# Patient Record
Sex: MALE | Race: WHITE | NOT HISPANIC OR LATINO
[De-identification: names, ages, dates, MRNs, and addresses within clinical notes are randomized per-mention and may not be internally consistent; named-entity substitution may affect disease eponyms.]

---

## 2017-07-03 PROBLEM — Z78.9 SOCIAL ALCOHOL USE: Status: ACTIVE | Noted: 2017-07-03

## 2017-07-03 PROBLEM — R94.31 ABNORMAL EKG: Status: ACTIVE | Noted: 2017-07-03

## 2017-07-03 PROBLEM — I50.9 CHF (CONGESTIVE HEART FAILURE): Status: ACTIVE | Noted: 2017-07-03

## 2017-07-03 PROBLEM — R00.2 PALPITATIONS: Status: ACTIVE | Noted: 2017-07-03

## 2017-07-05 ENCOUNTER — APPOINTMENT (OUTPATIENT)
Dept: CARDIOTHORACIC SURGERY | Facility: CLINIC | Age: 62
End: 2017-07-05

## 2017-07-05 VITALS
RESPIRATION RATE: 15 BRPM | OXYGEN SATURATION: 97 % | TEMPERATURE: 98.4 F | HEART RATE: 87 BPM | SYSTOLIC BLOOD PRESSURE: 152 MMHG | BODY MASS INDEX: 23.1 KG/M2 | WEIGHT: 165 LBS | DIASTOLIC BLOOD PRESSURE: 91 MMHG | HEIGHT: 71 IN

## 2017-07-05 DIAGNOSIS — R00.2 PALPITATIONS: ICD-10-CM

## 2017-07-05 DIAGNOSIS — I50.9 HEART FAILURE, UNSPECIFIED: ICD-10-CM

## 2017-07-05 DIAGNOSIS — Z78.9 OTHER SPECIFIED HEALTH STATUS: ICD-10-CM

## 2017-07-05 DIAGNOSIS — R94.31 ABNORMAL ELECTROCARDIOGRAM [ECG] [EKG]: ICD-10-CM

## 2017-07-05 DIAGNOSIS — I25.10 ATHEROSCLEROTIC HEART DISEASE OF NATIVE CORONARY ARTERY W/OUT ANGINA PECTORIS: ICD-10-CM

## 2017-07-13 ENCOUNTER — APPOINTMENT (OUTPATIENT)
Dept: CV DIAGNOSITCS | Facility: HOSPITAL | Age: 62
End: 2017-07-13

## 2017-07-13 ENCOUNTER — OUTPATIENT (OUTPATIENT)
Dept: OUTPATIENT SERVICES | Facility: HOSPITAL | Age: 62
LOS: 1 days | End: 2017-07-13
Payer: COMMERCIAL

## 2017-07-13 DIAGNOSIS — I71.9 AORTIC ANEURYSM OF UNSPECIFIED SITE, WITHOUT RUPTURE: ICD-10-CM

## 2017-07-13 PROCEDURE — 93306 TTE W/DOPPLER COMPLETE: CPT

## 2017-07-13 PROCEDURE — 93306 TTE W/DOPPLER COMPLETE: CPT | Mod: 26

## 2017-08-17 ENCOUNTER — APPOINTMENT (OUTPATIENT)
Dept: CV DIAGNOSTICS | Facility: HOSPITAL | Age: 62
End: 2017-08-17

## 2017-08-17 ENCOUNTER — OUTPATIENT (OUTPATIENT)
Dept: OUTPATIENT SERVICES | Facility: HOSPITAL | Age: 62
LOS: 1 days | End: 2017-08-17
Payer: COMMERCIAL

## 2017-08-17 DIAGNOSIS — I71.9 AORTIC ANEURYSM OF UNSPECIFIED SITE, WITHOUT RUPTURE: ICD-10-CM

## 2017-08-22 ENCOUNTER — APPOINTMENT (OUTPATIENT)
Dept: CV DIAGNOSTICS | Facility: HOSPITAL | Age: 62
End: 2017-08-22

## 2017-08-22 PROCEDURE — 93018 CV STRESS TEST I&R ONLY: CPT

## 2017-08-22 PROCEDURE — 78452 HT MUSCLE IMAGE SPECT MULT: CPT | Mod: 26

## 2017-08-22 PROCEDURE — 78452 HT MUSCLE IMAGE SPECT MULT: CPT

## 2017-08-22 PROCEDURE — 93016 CV STRESS TEST SUPVJ ONLY: CPT

## 2017-08-22 PROCEDURE — A9500: CPT

## 2017-08-22 PROCEDURE — 93017 CV STRESS TEST TRACING ONLY: CPT

## 2017-09-21 ENCOUNTER — APPOINTMENT (OUTPATIENT)
Dept: CARDIOTHORACIC SURGERY | Facility: CLINIC | Age: 62
End: 2017-09-21
Payer: COMMERCIAL

## 2017-09-21 VITALS
RESPIRATION RATE: 14 BRPM | HEART RATE: 69 BPM | DIASTOLIC BLOOD PRESSURE: 69 MMHG | HEIGHT: 71 IN | SYSTOLIC BLOOD PRESSURE: 115 MMHG | OXYGEN SATURATION: 97 % | WEIGHT: 165 LBS | TEMPERATURE: 98.2 F | BODY MASS INDEX: 23.1 KG/M2

## 2017-09-21 DIAGNOSIS — R06.09 OTHER FORMS OF DYSPNEA: ICD-10-CM

## 2017-09-21 PROCEDURE — 99214 OFFICE O/P EST MOD 30 MIN: CPT

## 2017-09-21 RX ORDER — ALBUTEROL 90 MCG
90 AEROSOL (GRAM) INHALATION
Refills: 0 | Status: COMPLETED | COMMUNITY
End: 2017-09-21

## 2017-09-21 RX ORDER — BISOPROLOL FUMARATE 5 MG/1
5 TABLET, FILM COATED ORAL DAILY
Refills: 0 | Status: COMPLETED | COMMUNITY
End: 2017-09-21

## 2017-09-21 RX ORDER — ENALAPRIL MALEATE 20 MG/1
20 TABLET ORAL DAILY
Refills: 0 | Status: COMPLETED | COMMUNITY
End: 2017-09-21

## 2017-09-25 ENCOUNTER — APPOINTMENT (OUTPATIENT)
Dept: GASTROENTEROLOGY | Facility: CLINIC | Age: 62
End: 2017-09-25

## 2017-09-29 ENCOUNTER — APPOINTMENT (OUTPATIENT)
Dept: GASTROENTEROLOGY | Facility: CLINIC | Age: 62
End: 2017-09-29
Payer: COMMERCIAL

## 2017-09-29 VITALS
DIASTOLIC BLOOD PRESSURE: 85 MMHG | RESPIRATION RATE: 16 BRPM | HEART RATE: 74 BPM | WEIGHT: 165 LBS | BODY MASS INDEX: 23.1 KG/M2 | SYSTOLIC BLOOD PRESSURE: 135 MMHG | OXYGEN SATURATION: 98 % | HEIGHT: 71 IN

## 2017-09-29 DIAGNOSIS — Z85.51 PERSONAL HISTORY OF MALIGNANT NEOPLASM OF BLADDER: ICD-10-CM

## 2017-09-29 PROCEDURE — 99204 OFFICE O/P NEW MOD 45 MIN: CPT

## 2017-10-06 ENCOUNTER — APPOINTMENT (OUTPATIENT)
Dept: GASTROENTEROLOGY | Facility: CLINIC | Age: 62
End: 2017-10-06
Payer: COMMERCIAL

## 2017-10-06 VITALS
OXYGEN SATURATION: 98 % | BODY MASS INDEX: 23.1 KG/M2 | HEIGHT: 71 IN | HEART RATE: 66 BPM | DIASTOLIC BLOOD PRESSURE: 96 MMHG | SYSTOLIC BLOOD PRESSURE: 142 MMHG | WEIGHT: 165 LBS | RESPIRATION RATE: 16 BRPM | TEMPERATURE: 98.6 F

## 2017-10-06 DIAGNOSIS — R07.89 OTHER CHEST PAIN: ICD-10-CM

## 2017-10-06 PROCEDURE — 99214 OFFICE O/P EST MOD 30 MIN: CPT

## 2017-10-06 PROCEDURE — 99204 OFFICE O/P NEW MOD 45 MIN: CPT

## 2017-10-06 RX ORDER — AZILSARTAN KAMEDOXOMIL AND CHLORTHALIDONE 40; 12.5 MG/1; MG/1
40-12.5 TABLET ORAL DAILY
Refills: 0 | Status: DISCONTINUED | COMMUNITY
Start: 2017-09-21 | End: 2017-10-06

## 2017-10-06 RX ORDER — POLYETHYLENE GLYCOL 3350, SODIUM SULFATE, SODIUM CHLORIDE, POTASSIUM CHLORIDE, ASCORBIC ACID, SODIUM ASCORBATE 7.5-2.691G
100 KIT ORAL
Qty: 1 | Refills: 0 | Status: DISCONTINUED | COMMUNITY
Start: 2017-09-29 | End: 2017-10-06

## 2017-12-01 ENCOUNTER — CLINICAL ADVICE (OUTPATIENT)
Age: 62
End: 2017-12-01

## 2017-12-22 ENCOUNTER — APPOINTMENT (OUTPATIENT)
Dept: CV DIAGNOSITCS | Facility: HOSPITAL | Age: 62
End: 2017-12-22

## 2017-12-22 ENCOUNTER — OUTPATIENT (OUTPATIENT)
Dept: OUTPATIENT SERVICES | Facility: HOSPITAL | Age: 62
LOS: 1 days | End: 2017-12-22
Payer: COMMERCIAL

## 2017-12-22 DIAGNOSIS — I50.9 HEART FAILURE, UNSPECIFIED: ICD-10-CM

## 2017-12-22 PROCEDURE — 93306 TTE W/DOPPLER COMPLETE: CPT

## 2017-12-22 PROCEDURE — 93306 TTE W/DOPPLER COMPLETE: CPT | Mod: 26

## 2018-02-23 ENCOUNTER — CLINICAL ADVICE (OUTPATIENT)
Age: 63
End: 2018-02-23

## 2018-03-01 LAB
HDLC SERPL-MCNC: 47 MG/DL
LDLC SERPL DIRECT ASSAY-MCNC: 132 MG/DL
TRIGL SERPL-MCNC: 220 MG/DL

## 2019-12-27 ENCOUNTER — APPOINTMENT (OUTPATIENT)
Dept: GASTROENTEROLOGY | Facility: CLINIC | Age: 64
End: 2019-12-27
Payer: COMMERCIAL

## 2019-12-27 VITALS
DIASTOLIC BLOOD PRESSURE: 77 MMHG | OXYGEN SATURATION: 94 % | SYSTOLIC BLOOD PRESSURE: 145 MMHG | WEIGHT: 180 LBS | HEART RATE: 84 BPM | HEIGHT: 71 IN | BODY MASS INDEX: 25.2 KG/M2

## 2019-12-27 DIAGNOSIS — K44.9 DIAPHRAGMATIC HERNIA W/OUT OBSTRUCTION OR GANGRENE: ICD-10-CM

## 2019-12-27 PROCEDURE — 99214 OFFICE O/P EST MOD 30 MIN: CPT

## 2019-12-27 NOTE — HISTORY OF PRESENT ILLNESS
[de-identified] : 65 y/o man with Hx of HTN, hyperlipidemia, CAD, CHF, aortic aneurysm, COPD not on home O2, former smoker who presents for follow up. \par \par The patient had a CT scan of the abdomen and pelvis with IV contrast on October 21, 2019.  The pancreas appeared normal. He likely have a benign adenoma in the right adrenal gland measuring 2.1 cm.   A small hiatal hernia is present.\par \par Patient reports that he continues to take Dexilant 60 mg once a day since 2017. He also takes Tagamet along with Carafate. He says despite these medications, once a week, he has heartburn and will have reflux of "acid" in his mouth. He also reports having a "twisty" type of pain in his epigastric area. The twisty pain occurs at the time of heartburn.\par \par In 2017, he required stents in his heart and was on Plavix. He's currently only on aspirin 81 mg once a day.\par \par In 2018 he underwent upper endoscopy and colonoscopy. He had colon polyps removed. Told that he has a hiatal hernia.  Was told told to have a repeat colonoscopy in 2 years - but not sure.  I don't have records of his endoscopy and colonoscopy and he will get those records for our review.\par \par \par \par \par \par From prior notes: \par Pt reports having heartburn for years requiring Nexium. Lately he has been on Nexium twice a day with worsening heartburn. He says in the middle of the night he wakes up with chest pain that feels like a knife, radiating to back- lasting for a few min, or longer. When this happens he has to stretch his back, catch his breath, drink water. He says during the day he will also get similar chest pains - during the day he says he is constantly moving, walking because of his job. He says he gets SOB on exertion - SOB has is getting worse recently. Stress test on Aug 22, 2017 showed medium sized mild to moderate defect in the basal to mild inferoseptal and basal inferior wall that is mostly fixed suggestive of infarct with minimal héctor-infarct ischemia. He has an appointment with his cardiologist. \par \par In 2014 patient reports he had chest discomfort and presented to his local ER where imaging revealed aorta dilatation (ascending aorta measured up to 4.1 cm). Patient followed up with cardiologist with repeat imaging. Most recent CT scan measured aortic root at 5.1 cm. He is seen by cardiothoracic surgeon and was "recommend to continue to monitor and operate when necessary as surgery would be complex from a surgical standpoint. Continue to monitor on a regular basis and if there are signs to intervene we would then operate. Imaging in six months (November/December)."\par \par He has a Hx of colon polyps in the past - last colonoscopy was 6 or 7 years ago - he was told to have another colonoscopy in 2 years but did not follow through. \par \par He was recently seen by a gastroenterologist and scheduled for an upper endoscopy and colonoscopy but he has not had it yet because he needed clearance.\par \par He says two of his uncles on both side of the family have had pancreatic cancer (youngest age was 42). His paternal Aunt has had pancreatic cancer. His father has had colon polyps. \par  \par \par \par Discussion/Summary Oct 2017: \par Patient continues to have worsening heartburn - he says he has tried everything under the sun including Pepcid, Ranitidine, Nexium, omeprazole, PPI twice a day and nothing helps.  Dexilant samples that were given in office has helped and he would like to try this. \par \par  \par \par

## 2019-12-27 NOTE — PHYSICAL EXAM
[General Appearance - Alert] : alert [Sclera] : the sclera and conjunctiva were normal [Extraocular Movements] : extraocular movements were intact [General Appearance - In No Acute Distress] : in no acute distress [Outer Ear] : the ears and nose were normal in appearance [Hearing Threshold Finger Rub Not [place]] : hearing was normal [Neck Appearance] : the appearance of the neck was normal [Neck Cervical Mass (___cm)] : no neck mass was observed [Auscultation Breath Sounds / Voice Sounds] : lungs were clear to auscultation bilaterally [Heart Rate And Rhythm] : heart rate was normal and rhythm regular [Heart Sounds] : normal S1 and S2 [Heart Sounds Gallop] : no gallops [Murmurs] : no murmurs [Bowel Sounds] : normal bowel sounds [Heart Sounds Pericardial Friction Rub] : no pericardial rub [Abdomen Tenderness] : non-tender [Abdomen Soft] : soft [Supraclavicular Lymph Nodes Enlarged Bilaterally] : supraclavicular [Cervical Lymph Nodes Enlarged Anterior Bilaterally] : anterior cervical [Cervical Lymph Nodes Enlarged Posterior Bilaterally] : posterior cervical [Skin Color & Pigmentation] : normal skin color and pigmentation [Nail Clubbing] : no clubbing  or cyanosis of the fingernails [] : no rash [Oriented To Time, Place, And Person] : oriented to person, place, and time [Affect] : the affect was normal [Impaired Insight] : insight and judgment were intact [FreeTextEntry1] : +surgical scaring

## 2019-12-27 NOTE — ASSESSMENT
[FreeTextEntry1] : 63 y/o man with Hx of HTN, hyperlipidemia, CAD, CHF, aortic aneurysm, COPD not on home O2, former smoker who presents for follow up of GERD.  \par \par Discussed side effects of prolonged PPI therapy.  He would like to continue PPI therapy for along with Tagamet - he will focus on weight loss and see if he can gradually come off of PPI therapy.  I also had a discussion regarding surgical correction of hiatal hernia - he will think about and would like an initial consultation. \par \par He will obtain records of his recent EGD/colonoscopy - he will call me to review them with him in 2 weeks.  He maybe due for a colonoscopy soon, but he is not sure - he will get us records.  \par \par Follow up in 3 months.

## 2020-03-31 ENCOUNTER — APPOINTMENT (OUTPATIENT)
Dept: GASTROENTEROLOGY | Facility: HOSPITAL | Age: 65
End: 2020-03-31

## 2020-06-16 ENCOUNTER — APPOINTMENT (OUTPATIENT)
Dept: DISASTER EMERGENCY | Facility: CLINIC | Age: 65
End: 2020-06-16

## 2020-06-17 ENCOUNTER — TRANSCRIPTION ENCOUNTER (OUTPATIENT)
Age: 65
End: 2020-06-17

## 2020-06-17 LAB — SARS-COV-2 N GENE NPH QL NAA+PROBE: NOT DETECTED

## 2020-06-18 ENCOUNTER — OUTPATIENT (OUTPATIENT)
Dept: OUTPATIENT SERVICES | Facility: HOSPITAL | Age: 65
LOS: 1 days | End: 2020-06-18
Payer: COMMERCIAL

## 2020-06-18 ENCOUNTER — APPOINTMENT (OUTPATIENT)
Dept: GASTROENTEROLOGY | Facility: HOSPITAL | Age: 65
End: 2020-06-18

## 2020-06-18 ENCOUNTER — RESULT REVIEW (OUTPATIENT)
Age: 65
End: 2020-06-18

## 2020-06-18 DIAGNOSIS — K21.9 GASTRO-ESOPHAGEAL REFLUX DISEASE WITHOUT ESOPHAGITIS: ICD-10-CM

## 2020-06-18 PROCEDURE — 88312 SPECIAL STAINS GROUP 1: CPT

## 2020-06-18 PROCEDURE — 88312 SPECIAL STAINS GROUP 1: CPT | Mod: 26

## 2020-06-18 PROCEDURE — 88313 SPECIAL STAINS GROUP 2: CPT

## 2020-06-18 PROCEDURE — 43239 EGD BIOPSY SINGLE/MULTIPLE: CPT

## 2020-06-18 PROCEDURE — 88305 TISSUE EXAM BY PATHOLOGIST: CPT

## 2020-06-18 PROCEDURE — 88313 SPECIAL STAINS GROUP 2: CPT | Mod: 26

## 2020-06-18 PROCEDURE — 88305 TISSUE EXAM BY PATHOLOGIST: CPT | Mod: 26

## 2020-07-20 ENCOUNTER — APPOINTMENT (OUTPATIENT)
Dept: DISASTER EMERGENCY | Facility: CLINIC | Age: 65
End: 2020-07-20

## 2020-07-20 DIAGNOSIS — Z01.818 ENCOUNTER FOR OTHER PREPROCEDURAL EXAMINATION: ICD-10-CM

## 2020-07-21 LAB — SARS-COV-2 N GENE NPH QL NAA+PROBE: NOT DETECTED

## 2020-07-22 ENCOUNTER — TRANSCRIPTION ENCOUNTER (OUTPATIENT)
Age: 65
End: 2020-07-22

## 2020-07-23 ENCOUNTER — RESULT REVIEW (OUTPATIENT)
Age: 65
End: 2020-07-23

## 2020-07-23 ENCOUNTER — OUTPATIENT (OUTPATIENT)
Dept: OUTPATIENT SERVICES | Facility: HOSPITAL | Age: 65
LOS: 1 days | End: 2020-07-23
Payer: COMMERCIAL

## 2020-07-23 ENCOUNTER — APPOINTMENT (OUTPATIENT)
Dept: GASTROENTEROLOGY | Facility: HOSPITAL | Age: 65
End: 2020-07-23

## 2020-07-23 DIAGNOSIS — Z12.11 ENCOUNTER FOR SCREENING FOR MALIGNANT NEOPLASM OF COLON: ICD-10-CM

## 2020-07-23 PROCEDURE — 45385 COLONOSCOPY W/LESION REMOVAL: CPT | Mod: PT

## 2020-07-23 PROCEDURE — 88305 TISSUE EXAM BY PATHOLOGIST: CPT | Mod: 26

## 2020-07-23 PROCEDURE — 88305 TISSUE EXAM BY PATHOLOGIST: CPT

## 2020-07-23 PROCEDURE — 45385 COLONOSCOPY W/LESION REMOVAL: CPT

## 2020-10-17 ENCOUNTER — APPOINTMENT (OUTPATIENT)
Dept: DISASTER EMERGENCY | Facility: CLINIC | Age: 65
End: 2020-10-17

## 2020-10-18 LAB — SARS-COV-2 N GENE NPH QL NAA+PROBE: NOT DETECTED

## 2020-10-19 ENCOUNTER — TRANSCRIPTION ENCOUNTER (OUTPATIENT)
Age: 65
End: 2020-10-19

## 2020-10-20 ENCOUNTER — OUTPATIENT (OUTPATIENT)
Dept: OUTPATIENT SERVICES | Facility: HOSPITAL | Age: 65
LOS: 1 days | End: 2020-10-20
Payer: MEDICARE

## 2020-10-20 ENCOUNTER — RESULT REVIEW (OUTPATIENT)
Age: 65
End: 2020-10-20

## 2020-10-20 ENCOUNTER — APPOINTMENT (OUTPATIENT)
Dept: GASTROENTEROLOGY | Facility: CLINIC | Age: 65
End: 2020-10-20

## 2020-10-20 DIAGNOSIS — Z12.11 ENCOUNTER FOR SCREENING FOR MALIGNANT NEOPLASM OF COLON: ICD-10-CM

## 2020-10-20 PROCEDURE — 45385 COLONOSCOPY W/LESION REMOVAL: CPT | Mod: PT

## 2020-10-20 PROCEDURE — 88305 TISSUE EXAM BY PATHOLOGIST: CPT

## 2020-10-20 PROCEDURE — 45385 COLONOSCOPY W/LESION REMOVAL: CPT

## 2020-10-20 PROCEDURE — 88305 TISSUE EXAM BY PATHOLOGIST: CPT | Mod: 26

## 2020-10-28 ENCOUNTER — TRANSCRIPTION ENCOUNTER (OUTPATIENT)
Age: 65
End: 2020-10-28

## 2020-10-28 ENCOUNTER — APPOINTMENT (OUTPATIENT)
Dept: GASTROENTEROLOGY | Facility: CLINIC | Age: 65
End: 2020-10-28
Payer: MEDICARE

## 2020-10-28 VITALS
SYSTOLIC BLOOD PRESSURE: 136 MMHG | DIASTOLIC BLOOD PRESSURE: 80 MMHG | HEART RATE: 80 BPM | WEIGHT: 195 LBS | BODY MASS INDEX: 27.3 KG/M2 | OXYGEN SATURATION: 97 % | RESPIRATION RATE: 14 BRPM | HEIGHT: 71 IN

## 2020-10-28 DIAGNOSIS — Z80.0 FAMILY HISTORY OF MALIGNANT NEOPLASM OF DIGESTIVE ORGANS: ICD-10-CM

## 2020-10-28 PROCEDURE — 99215 OFFICE O/P EST HI 40 MIN: CPT

## 2020-10-28 NOTE — PHYSICAL EXAM
[General Appearance - Alert] : alert [General Appearance - In No Acute Distress] : in no acute distress [Sclera] : the sclera and conjunctiva were normal [Extraocular Movements] : extraocular movements were intact [Outer Ear] : the ears and nose were normal in appearance [Hearing Threshold Finger Rub Not [place]] : hearing was normal [Neck Appearance] : the appearance of the neck was normal [Neck Cervical Mass (___cm)] : no neck mass was observed [Auscultation Breath Sounds / Voice Sounds] : lungs were clear to auscultation bilaterally [Heart Rate And Rhythm] : heart rate was normal and rhythm regular [Heart Sounds] : normal S1 and S2 [Heart Sounds Gallop] : no gallops [Murmurs] : no murmurs [Heart Sounds Pericardial Friction Rub] : no pericardial rub [Bowel Sounds] : normal bowel sounds [Abdomen Soft] : soft [FreeTextEntry1] : mild epigastric tenderness. [Cervical Lymph Nodes Enlarged Posterior Bilaterally] : posterior cervical [Cervical Lymph Nodes Enlarged Anterior Bilaterally] : anterior cervical [Supraclavicular Lymph Nodes Enlarged Bilaterally] : supraclavicular [Abnormal Walk] : normal gait [Nail Clubbing] : no clubbing  or cyanosis of the fingernails [Skin Color & Pigmentation] : normal skin color and pigmentation [] : no rash [Oriented To Time, Place, And Person] : oriented to person, place, and time [Impaired Insight] : insight and judgment were intact [Affect] : the affect was normal

## 2020-10-28 NOTE — HISTORY OF PRESENT ILLNESS
[de-identified] : 64 y/o father of two, with Hx of HTN, hyperlipidemia, CAD, CHF, aortic aneurysm, COPD not on home O2, former smoker who presents for follow up. \par \par Feeling well.   Once in a while epigastric pain - says occurs frequently - Sometimes the pain is more than 5/10 on pain scale - when sitting it can hurt more - when laying down it feels better.  It can last for several hours - no nausea, no vomiting.  Pain has been present since July of this year.  Pain stable.  Feels it "slightly now." \par \par \par \par \par \par \par From prior notes: \par Pt reports having heartburn for years requiring Nexium. Lately he has been on Nexium twice a day with worsening heartburn. He says in the middle of the night he wakes up with chest pain that feels like a knife, radiating to back- lasting for a few min, or longer. When this happens he has to stretch his back, catch his breath, drink water. He says during the day he will also get similar chest pains - during the day he says he is constantly moving, walking because of his job. He says he gets SOB on exertion - SOB has is getting worse recently. Stress test on Aug 22, 2017 showed medium sized mild to moderate defect in the basal to mild inferoseptal and basal inferior wall that is mostly fixed suggestive of infarct with minimal héctor-infarct ischemia. He has an appointment with his cardiologist. \par \par In 2014 patient reports he had chest discomfort and presented to his local ER where imaging revealed aorta dilatation (ascending aorta measured up to 4.1 cm). Patient followed up with cardiologist with repeat imaging. Most recent CT scan measured aortic root at 5.1 cm. He is seen by cardiothoracic surgeon and was "recommend to continue to monitor and operate when necessary as surgery would be complex from a surgical standpoint. Continue to monitor on a regular basis and if there are signs to intervene we would then operate. Imaging in six months (November/December)."\par \par He has a Hx of colon polyps in the past - last colonoscopy was 6 or 7 years ago - he was told to have another colonoscopy in 2 years but did not follow through. \par \par He was recently seen by a gastroenterologist and scheduled for an upper endoscopy and colonoscopy but he has not had it yet because he needed clearance.\par \par He says two of his uncles on both side of the family have had pancreatic cancer (youngest age was 42). His paternal Aunt has had pancreatic cancer. His father has had colon polyps. \par  \par \par \par Discussion/Summary Oct 2017: \par Patient continues to have worsening heartburn - he says he has tried everything under the sun including Pepcid, Ranitidine, Nexium, omeprazole, PPI twice a day and nothing helps. Dexilant samples that were given in office has helped and he would like to try this. \par \par  \par \par  \par Dec 2019 visit:  \par The patient had a CT scan of the abdomen and pelvis with IV contrast on October 21, 2019. The pancreas appeared normal. He likely have a benign adenoma in the right adrenal gland measuring 2.1 cm. A small hiatal hernia is present.\par \par Patient reports that he continues to take Dexilant 60 mg once a day since 2017. He also takes Tagamet along with Carafate. He says despite these medications, once a week, he has heartburn and will have reflux of "acid" in his mouth. He also reports having a "twisty" type of pain in his epigastric area. The twisty pain occurs at the time of heartburn.\par \par In 2017, he required stents in his heart and was on Plavix. He's currently only on aspirin 81 mg once a day.\par \par In 2018 he underwent upper endoscopy and colonoscopy. He had colon polyps removed. Told that he has a hiatal hernia. Was told told to have a repeat colonoscopy in 2 years - but not sure. I don't have records of his endoscopy and colonoscopy and he will get those records for our review.\par \par \par Discussion/Summary Feb 2020: \par Called patient and reviewed prior EGD/colonoscopy in 2018 - multiple colon polyps (tubular and hyperplastic) - he is due for colonoscopy this year. \par \par Would like to do EGD since IM in antrum in 2018. \par \par Risks of procedures such as perforation requiring surgery, bleeding, infection, diverticulitis, colitis, missed colon cancer (2% to 6%), internal organ injury, etc, risks of bowel prep including colitis, syncope, adverse reaction to medication etc. and risks of anesthesia including cardiopulmonary compromise were discussed with patient. Patient verbalized understanding and agrees to proceed with the planned procedure.\par \par Would like procedures on separate days. \par \par He will need cardiology clearance. \par  \par \par July 2020 colonoscopy: two cecal polyps removed, one ascending colon polyp removed, three transverse colon polyp removed, three descending colon polyp removed, twenty one sigmoid colon polyps removed.  Other polyps left in left colon. \par \par Cecal polyps were sessile serrated adenomas.  Ascending colon polyp was a tubular adenoma, transverse colon polyps were tubular adenomas, descending colon polyps were tubular adenomas, sigmoid colon polyps were tubulovillous adenoma and hyperplastic polyps.  \par \par \par Discussion/Summary July 2020: \par Called and reviewed recent colonoscopy results - advised genetic testing since many colon polyps. Contact information given. \par \par \par Colonoscopy in Oct 2020: \par Multiple colon polyps removed ~31 polyps all less than 1 cm.  Multiple other colon polyps were not removed in left colon.  One cecal polyp removed, one ascending colon polyps removed, one transverse colon polyp removed, two descending colon polyp removed, 21 sigmoid colon polyps removed.  \par \par Cecal polyp was tubular adenoma.  Transverse colon polyp was a tubular adenoma.  Descending colon polyp was an adenoma.  Sigmoid colon polyps were tubular adenomas and hyperplastic polyps.  Rectal polyps were hyperplastic polyps.  \par

## 2020-10-28 NOTE — ASSESSMENT
[FreeTextEntry1] : IMPRESSION: \par 1.  Epigastric pain since July of 2020 \par 2.  Serrated polyposis syndrome - his father reportedly had a lot of polyps\par 3.  Strong family Hx of pancreatic cancer (two uncles on both sides and one paternal aunt -youngest age was 42\par 4.  Hx of Intestinal metaplasia in antrum of stomach - Surveillance EGD in June 2020 for gastric mapping negative for intestinal metaplasia.   \par 5.  Hiatal hernia - 3 cm on recent endoscopy in June 2020 \par 4.  Multiple comorbidities:  CAD, CHF, COPD, HTN, Hyperlipidemia and Aortic aneurysm \par \par \par \par DISCUSSION/PLAN:\par Patients with Serrated Polyposis Syndrome may have an increased risk of colorectal cancer (1.9 percent in five years).   It is unclear if patients with Serrated Polyposis Syndrome are also at an increased risk for pancreatic cancer.  This was discussed with the patient. \par \par Management of patients with Serrated Polyposis Syndrome – Management strategies for patients with Serrated Polyposis Syndrome and their families have not been well defined. Polyps should be resected completely. Subsequent colonoscopy intervals are based on the number and size of polyps, as well as the number of concurrent adenomas.  Risks of colonoscopies, including risk of colon cancer despite surveillance colonoscopies was discussed.  Family members should begin screening for colon cancer at age 40.  This was all discussed with patient.  \par \par We also discussed indications for colectomy in patients with Serrated polyposis syndrome.  They include the following:  Documented or suspected Colon cancer.  Polyps with high-grade dysplasia or multiple adenomas larger than 6 mm.  Marked increases in polyp number on consecutive exams.  Inability to adequately survey the colon because of multiple diminutive polyps.\par \par After a detailed discussion, he will like to think about all that was discussed, and review with his family.  In the mean time he is agreeable to arrange for another colonoscopy in 3 months - he will need a two day bowel prep - he will need cardiology clearance prior to colonoscopy. \par  \par Although genetic testing is not needed for Serrated Polyposis Syndrome, I have still recommended to see a Genetic counselor since three members in family with pancreatic cancer.  Results of Genetic testing will affect his two children.  He is agreeable to see a genetic counselor - contact information given. \par \par Since patient has epigastric pain, I will obtain a CT scan of the abdomen with IV contrast.  Blood work prior to CT scan.

## 2020-10-29 ENCOUNTER — NON-APPOINTMENT (OUTPATIENT)
Age: 65
End: 2020-10-29

## 2020-10-30 ENCOUNTER — APPOINTMENT (OUTPATIENT)
Dept: CT IMAGING | Facility: CLINIC | Age: 65
End: 2020-10-30
Payer: MEDICARE

## 2020-10-30 ENCOUNTER — OUTPATIENT (OUTPATIENT)
Dept: OUTPATIENT SERVICES | Facility: HOSPITAL | Age: 65
LOS: 1 days | End: 2020-10-30
Payer: MEDICARE

## 2020-10-30 ENCOUNTER — RESULT REVIEW (OUTPATIENT)
Age: 65
End: 2020-10-30

## 2020-10-30 DIAGNOSIS — R10.13 EPIGASTRIC PAIN: ICD-10-CM

## 2020-10-30 PROCEDURE — 74160 CT ABDOMEN W/CONTRAST: CPT

## 2020-10-30 PROCEDURE — 74160 CT ABDOMEN W/CONTRAST: CPT | Mod: 26

## 2020-11-02 ENCOUNTER — NON-APPOINTMENT (OUTPATIENT)
Age: 65
End: 2020-11-02

## 2020-11-03 ENCOUNTER — NON-APPOINTMENT (OUTPATIENT)
Age: 65
End: 2020-11-03

## 2020-11-09 ENCOUNTER — NON-APPOINTMENT (OUTPATIENT)
Age: 65
End: 2020-11-09

## 2021-01-04 ENCOUNTER — APPOINTMENT (OUTPATIENT)
Dept: GASTROENTEROLOGY | Facility: CLINIC | Age: 66
End: 2021-01-04

## 2021-01-21 DIAGNOSIS — K63.5 POLYP OF COLON: ICD-10-CM

## 2021-01-29 DIAGNOSIS — Z86.010 PERSONAL HISTORY OF COLONIC POLYPS: ICD-10-CM

## 2021-02-02 ENCOUNTER — APPOINTMENT (OUTPATIENT)
Dept: DISASTER EMERGENCY | Facility: CLINIC | Age: 66
End: 2021-02-02

## 2021-02-05 ENCOUNTER — NON-APPOINTMENT (OUTPATIENT)
Age: 66
End: 2021-02-05

## 2021-03-11 ENCOUNTER — NON-APPOINTMENT (OUTPATIENT)
Age: 66
End: 2021-03-11

## 2021-03-12 ENCOUNTER — NON-APPOINTMENT (OUTPATIENT)
Age: 66
End: 2021-03-12

## 2021-04-17 ENCOUNTER — APPOINTMENT (OUTPATIENT)
Dept: DISASTER EMERGENCY | Facility: CLINIC | Age: 66
End: 2021-04-17

## 2021-04-17 DIAGNOSIS — Z01.818 ENCOUNTER FOR OTHER PREPROCEDURAL EXAMINATION: ICD-10-CM

## 2021-04-18 LAB — SARS-COV-2 N GENE NPH QL NAA+PROBE: NOT DETECTED

## 2021-04-19 ENCOUNTER — TRANSCRIPTION ENCOUNTER (OUTPATIENT)
Age: 66
End: 2021-04-19

## 2021-04-20 ENCOUNTER — APPOINTMENT (OUTPATIENT)
Dept: GASTROENTEROLOGY | Facility: HOSPITAL | Age: 66
End: 2021-04-20

## 2021-04-20 ENCOUNTER — INPATIENT (INPATIENT)
Facility: HOSPITAL | Age: 66
LOS: 0 days | Discharge: HOME | DRG: 178 | End: 2021-04-21
Attending: STUDENT IN AN ORGANIZED HEALTH CARE EDUCATION/TRAINING PROGRAM | Admitting: INTERNAL MEDICINE
Payer: MEDICARE

## 2021-04-20 VITALS
SYSTOLIC BLOOD PRESSURE: 110 MMHG | DIASTOLIC BLOOD PRESSURE: 67 MMHG | HEART RATE: 87 BPM | RESPIRATION RATE: 20 BRPM | OXYGEN SATURATION: 94 % | TEMPERATURE: 98 F

## 2021-04-20 DIAGNOSIS — D12.6 BENIGN NEOPLASM OF COLON, UNSPECIFIED: ICD-10-CM

## 2021-04-20 DIAGNOSIS — Z98.890 OTHER SPECIFIED POSTPROCEDURAL STATES: Chronic | ICD-10-CM

## 2021-04-20 LAB
ALBUMIN SERPL ELPH-MCNC: 4 G/DL — SIGNIFICANT CHANGE UP (ref 3.3–5)
ALP SERPL-CCNC: 91 U/L — SIGNIFICANT CHANGE UP (ref 40–120)
ALT FLD-CCNC: 60 U/L — SIGNIFICANT CHANGE UP (ref 12–78)
ANION GAP SERPL CALC-SCNC: 8 MMOL/L — SIGNIFICANT CHANGE UP (ref 5–17)
APTT BLD: 34.9 SEC — SIGNIFICANT CHANGE UP (ref 27.5–35.5)
AST SERPL-CCNC: 28 U/L — SIGNIFICANT CHANGE UP (ref 15–37)
BASE EXCESS BLDA CALC-SCNC: -3.3 MMOL/L — LOW (ref -2–2)
BASOPHILS # BLD AUTO: 0.03 K/UL — SIGNIFICANT CHANGE UP (ref 0–0.2)
BASOPHILS NFR BLD AUTO: 0.3 % — SIGNIFICANT CHANGE UP (ref 0–2)
BILIRUB SERPL-MCNC: 0.8 MG/DL — SIGNIFICANT CHANGE UP (ref 0.2–1.2)
BLOOD GAS COMMENTS ARTERIAL: SIGNIFICANT CHANGE UP
BUN SERPL-MCNC: 20 MG/DL — SIGNIFICANT CHANGE UP (ref 7–23)
CALCIUM SERPL-MCNC: 9.2 MG/DL — SIGNIFICANT CHANGE UP (ref 8.5–10.1)
CHLORIDE SERPL-SCNC: 111 MMOL/L — HIGH (ref 96–108)
CO2 SERPL-SCNC: 25 MMOL/L — SIGNIFICANT CHANGE UP (ref 22–31)
CREAT SERPL-MCNC: 1.6 MG/DL — HIGH (ref 0.5–1.3)
EOSINOPHIL # BLD AUTO: 0.17 K/UL — SIGNIFICANT CHANGE UP (ref 0–0.5)
EOSINOPHIL NFR BLD AUTO: 1.6 % — SIGNIFICANT CHANGE UP (ref 0–6)
GLUCOSE SERPL-MCNC: 112 MG/DL — HIGH (ref 70–99)
HCO3 BLDA-SCNC: SIGNIFICANT CHANGE UP MMOL/L (ref 23–27)
HCT VFR BLD CALC: 51.8 % — HIGH (ref 39–50)
HGB BLD-MCNC: 17.2 G/DL — HIGH (ref 13–17)
HOROWITZ INDEX BLDA+IHG-RTO: 21 — SIGNIFICANT CHANGE UP
IMM GRANULOCYTES NFR BLD AUTO: 0.3 % — SIGNIFICANT CHANGE UP (ref 0–1.5)
INR BLD: 1.16 RATIO — SIGNIFICANT CHANGE UP (ref 0.88–1.16)
LACTATE SERPL-SCNC: 1.9 MMOL/L — SIGNIFICANT CHANGE UP (ref 0.7–2)
LYMPHOCYTES # BLD AUTO: 1.55 K/UL — SIGNIFICANT CHANGE UP (ref 1–3.3)
LYMPHOCYTES # BLD AUTO: 15 % — SIGNIFICANT CHANGE UP (ref 13–44)
MCHC RBC-ENTMCNC: 29.3 PG — SIGNIFICANT CHANGE UP (ref 27–34)
MCHC RBC-ENTMCNC: 33.2 GM/DL — SIGNIFICANT CHANGE UP (ref 32–36)
MCV RBC AUTO: 88.2 FL — SIGNIFICANT CHANGE UP (ref 80–100)
MONOCYTES # BLD AUTO: 0.6 K/UL — SIGNIFICANT CHANGE UP (ref 0–0.9)
MONOCYTES NFR BLD AUTO: 5.8 % — SIGNIFICANT CHANGE UP (ref 2–14)
NEUTROPHILS # BLD AUTO: 7.97 K/UL — HIGH (ref 1.8–7.4)
NEUTROPHILS NFR BLD AUTO: 77 % — SIGNIFICANT CHANGE UP (ref 43–77)
NRBC # BLD: 0 /100 WBCS — SIGNIFICANT CHANGE UP (ref 0–0)
PCO2 BLDA: 33 MMHG — SIGNIFICANT CHANGE UP (ref 32–46)
PH BLDA: 7.41 — SIGNIFICANT CHANGE UP (ref 7.35–7.45)
PLATELET # BLD AUTO: 239 K/UL — SIGNIFICANT CHANGE UP (ref 150–400)
PO2 BLDA: 69 MMHG — LOW (ref 74–108)
POTASSIUM SERPL-MCNC: 3.9 MMOL/L — SIGNIFICANT CHANGE UP (ref 3.5–5.3)
POTASSIUM SERPL-SCNC: 3.9 MMOL/L — SIGNIFICANT CHANGE UP (ref 3.5–5.3)
PROT SERPL-MCNC: 7.7 G/DL — SIGNIFICANT CHANGE UP (ref 6–8.3)
PROTHROM AB SERPL-ACNC: 13.5 SEC — SIGNIFICANT CHANGE UP (ref 10.6–13.6)
RBC # BLD: 5.87 M/UL — HIGH (ref 4.2–5.8)
RBC # FLD: 13.2 % — SIGNIFICANT CHANGE UP (ref 10.3–14.5)
SAO2 % BLDA: SIGNIFICANT CHANGE UP % (ref 92–96)
SODIUM SERPL-SCNC: 144 MMOL/L — SIGNIFICANT CHANGE UP (ref 135–145)
WBC # BLD: 10.35 K/UL — SIGNIFICANT CHANGE UP (ref 3.8–10.5)
WBC # FLD AUTO: 10.35 K/UL — SIGNIFICANT CHANGE UP (ref 3.8–10.5)

## 2021-04-20 PROCEDURE — 99223 1ST HOSP IP/OBS HIGH 75: CPT

## 2021-04-20 PROCEDURE — 71045 X-RAY EXAM CHEST 1 VIEW: CPT | Mod: 26

## 2021-04-20 RX ORDER — BUDESONIDE AND FORMOTEROL FUMARATE DIHYDRATE 160; 4.5 UG/1; UG/1
2 AEROSOL RESPIRATORY (INHALATION)
Refills: 0 | Status: DISCONTINUED | OUTPATIENT
Start: 2021-04-20 | End: 2021-04-21

## 2021-04-20 RX ORDER — HEPARIN SODIUM 5000 [USP'U]/ML
5000 INJECTION INTRAVENOUS; SUBCUTANEOUS EVERY 8 HOURS
Refills: 0 | Status: DISCONTINUED | OUTPATIENT
Start: 2021-04-20 | End: 2021-04-21

## 2021-04-20 RX ORDER — IPRATROPIUM/ALBUTEROL SULFATE 18-103MCG
3 AEROSOL WITH ADAPTER (GRAM) INHALATION ONCE
Refills: 0 | Status: COMPLETED | OUTPATIENT
Start: 2021-04-20 | End: 2021-04-20

## 2021-04-20 RX ORDER — SUCRALFATE 1 G
1 TABLET ORAL
Refills: 0 | Status: DISCONTINUED | OUTPATIENT
Start: 2021-04-20 | End: 2021-04-21

## 2021-04-20 RX ORDER — PANTOPRAZOLE SODIUM 20 MG/1
40 TABLET, DELAYED RELEASE ORAL
Refills: 0 | Status: DISCONTINUED | OUTPATIENT
Start: 2021-04-20 | End: 2021-04-21

## 2021-04-20 RX ORDER — ALBUTEROL 90 UG/1
1 AEROSOL, METERED ORAL EVERY 6 HOURS
Refills: 0 | Status: DISCONTINUED | OUTPATIENT
Start: 2021-04-20 | End: 2021-04-21

## 2021-04-20 RX ORDER — PIPERACILLIN AND TAZOBACTAM 4; .5 G/20ML; G/20ML
3.38 INJECTION, POWDER, LYOPHILIZED, FOR SOLUTION INTRAVENOUS ONCE
Refills: 0 | Status: COMPLETED | OUTPATIENT
Start: 2021-04-20 | End: 2021-04-20

## 2021-04-20 RX ORDER — ASPIRIN/CALCIUM CARB/MAGNESIUM 324 MG
81 TABLET ORAL DAILY
Refills: 0 | Status: DISCONTINUED | OUTPATIENT
Start: 2021-04-20 | End: 2021-04-21

## 2021-04-20 RX ORDER — METOPROLOL TARTRATE 50 MG
12.5 TABLET ORAL DAILY
Refills: 0 | Status: DISCONTINUED | OUTPATIENT
Start: 2021-04-20 | End: 2021-04-21

## 2021-04-20 RX ORDER — AMLODIPINE BESYLATE 2.5 MG/1
5 TABLET ORAL DAILY
Refills: 0 | Status: DISCONTINUED | OUTPATIENT
Start: 2021-04-20 | End: 2021-04-21

## 2021-04-20 RX ORDER — ACETAMINOPHEN 500 MG
650 TABLET ORAL EVERY 6 HOURS
Refills: 0 | Status: DISCONTINUED | OUTPATIENT
Start: 2021-04-20 | End: 2021-04-21

## 2021-04-20 RX ORDER — PIPERACILLIN AND TAZOBACTAM 4; .5 G/20ML; G/20ML
3.38 INJECTION, POWDER, LYOPHILIZED, FOR SOLUTION INTRAVENOUS EVERY 8 HOURS
Refills: 0 | Status: DISCONTINUED | OUTPATIENT
Start: 2021-04-20 | End: 2021-04-21

## 2021-04-20 RX ORDER — ATORVASTATIN CALCIUM 80 MG/1
20 TABLET, FILM COATED ORAL AT BEDTIME
Refills: 0 | Status: DISCONTINUED | OUTPATIENT
Start: 2021-04-20 | End: 2021-04-21

## 2021-04-20 RX ORDER — SODIUM CHLORIDE 9 MG/ML
1000 INJECTION INTRAMUSCULAR; INTRAVENOUS; SUBCUTANEOUS
Refills: 0 | Status: DISCONTINUED | OUTPATIENT
Start: 2021-04-20 | End: 2021-04-21

## 2021-04-20 RX ORDER — BUDESONIDE AND FORMOTEROL FUMARATE DIHYDRATE 160; 4.5 UG/1; UG/1
0 AEROSOL RESPIRATORY (INHALATION)
Qty: 0 | Refills: 0 | DISCHARGE

## 2021-04-20 RX ADMIN — Medication 650 MILLIGRAM(S): at 15:00

## 2021-04-20 RX ADMIN — Medication 650 MILLIGRAM(S): at 23:34

## 2021-04-20 RX ADMIN — Medication 3 MILLILITER(S): at 12:20

## 2021-04-20 RX ADMIN — ATORVASTATIN CALCIUM 20 MILLIGRAM(S): 80 TABLET, FILM COATED ORAL at 21:28

## 2021-04-20 RX ADMIN — Medication 1 GRAM(S): at 23:34

## 2021-04-20 RX ADMIN — PIPERACILLIN AND TAZOBACTAM 200 GRAM(S): 4; .5 INJECTION, POWDER, LYOPHILIZED, FOR SOLUTION INTRAVENOUS at 15:10

## 2021-04-20 RX ADMIN — BUDESONIDE AND FORMOTEROL FUMARATE DIHYDRATE 2 PUFF(S): 160; 4.5 AEROSOL RESPIRATORY (INHALATION) at 20:08

## 2021-04-20 RX ADMIN — Medication 1 GRAM(S): at 17:19

## 2021-04-20 RX ADMIN — Medication 650 MILLIGRAM(S): at 14:24

## 2021-04-20 RX ADMIN — HEPARIN SODIUM 5000 UNIT(S): 5000 INJECTION INTRAVENOUS; SUBCUTANEOUS at 21:28

## 2021-04-20 RX ADMIN — PIPERACILLIN AND TAZOBACTAM 25 GRAM(S): 4; .5 INJECTION, POWDER, LYOPHILIZED, FOR SOLUTION INTRAVENOUS at 23:34

## 2021-04-20 NOTE — CONSULT NOTE ADULT - ASSESSMENT
Mr Jeffries is a 64 yo M presented for elective colonoscopy and subsequently had an aspiration event. PMH Serrated polyposis syndrome, CAD, HTN, Aortic Aneurysm, GERD, COPD, Former Tobacco Dependence.   aspiration pneumonitis vs pna  will repeat CXR PA and LAT in am   monitor VS - possible de - escalation and conversion of ABX to PO in am -   will check biomarkers  I edin  monitor VS and HD and Sat  keep sat > 90 pct  PPI for GERD  Symbicort and Proventil PRN for COPD hx  GI eval noted  Anesthesia notes reviewed

## 2021-04-20 NOTE — CONSULT NOTE ADULT - CONSULT REASON
asp pneumonitis  aspiration  incomplete colonoscopy  copd  gerd  ex smoker
Vomited during colonoscopy.

## 2021-04-20 NOTE — CONSULT NOTE ADULT - SUBJECTIVE AND OBJECTIVE BOX
Date/Time Patient Seen:  		  Referring MD:   Data Reviewed	       Patient is a 65y old  Male who presents with a chief complaint of Aspiration Event (20 Apr 2021 14:28)      Subjective/HPI  in bed  seen and examined  vs and meds reviewed  labs reviewed  h and p reviewed  anesthesia notes reviewed  pt with known copd  pt is an ex smoker    has hx of gerd  suspected of aspiration today during attempted scope procedure    Mr Jeffries is a 64 yo M presented for elective colonoscopy and subsequently had an aspiration event. PMH Serrated polyposis syndrome, CAD, HTN, Aortic Aneurysm, GERD, COPD, Former Tobacco Dependence.   Patient seen and examined at bedside. He reports having a headache which is improving. He also felt some SOB right after vomiting, but has no current SOB. He has no other complaints.   Denies fevers, chills, light-headedness, dizziness, chest pain, palpitations, abdominal pain, constipation, diarrhea, melena, hematochezia, dysuria.   As per Anesthesia:   Upon administration of propofol and insertion of colonoscope, patient vomited yellowish tinged fluid from his oropharynx.  Oropharynx suctioned vigorously, patient was BMV for decreasing Sp02.  Procedure was aborted secondary to full stomach and possibility of continued vomiting.  Patient was taken to the holding area, vital signs are stable.      FAMILY HISTORY:  Father  Still living? Unknown  FH: COPD (chronic obstructive pulmonary disease), Age at diagnosis: Age Unknown    Mother  Still living? Unknown  FH: COPD (chronic obstructive pulmonary disease), Age at diagnosis: Age Unknown.     Social History:  Social History (marital status, living situation, occupation, tobacco use, alcohol and drug use, and sexual history): Former smoker, drinks 1 glass of wine weekly, no drug use     Tobacco Screening:  · Core Measure Site	Yes  · Has the patient used tobacco in the past 30 days?	No    Risk Assessment:    Present on Admission:  Deep Venous Thrombosis	no  Pulmonary Embolus	no     Heart Failure:  Does this patient have a history of or has been diagnosed with heart failure? no.     PAST MEDICAL & SURGICAL HISTORY:  HTN (hypertension)    CAD (coronary artery disease)    COPD without exacerbation    No significant past surgical history    S/P hernia repair          Medication list         MEDICATIONS  (STANDING):  amLODIPine   Tablet 5 milliGRAM(s) Oral daily  aspirin  chewable 81 milliGRAM(s) Oral daily  atorvastatin 20 milliGRAM(s) Oral at bedtime  budesonide  80 MICROgram(s)/formoterol 4.5 MICROgram(s) Inhaler 2 Puff(s) Inhalation two times a day  heparin   Injectable 5000 Unit(s) SubCutaneous every 8 hours  metoprolol tartrate 12.5 milliGRAM(s) Oral daily  pantoprazole    Tablet 40 milliGRAM(s) Oral before breakfast  piperacillin/tazobactam IVPB.. 3.375 Gram(s) IV Intermittent every 8 hours  sodium chloride 0.9%. 1000 milliLiter(s) (60 mL/Hr) IV Continuous <Continuous>  sucralfate 1 Gram(s) Oral four times a day    MEDICATIONS  (PRN):  acetaminophen   Tablet .. 650 milliGRAM(s) Oral every 6 hours PRN Mild Pain (1 - 3)  ALBUTerol    90 MICROgram(s) HFA Inhaler 1 Puff(s) Inhalation every 6 hours PRN Shortness of Breath and/or Wheezing         Vitals log        ICU Vital Signs Last 24 Hrs  T(C): 36.7 (20 Apr 2021 13:55), Max: 36.7 (20 Apr 2021 13:55)  T(F): 98 (20 Apr 2021 13:55), Max: 98 (20 Apr 2021 13:55)  HR: 88 (20 Apr 2021 13:55) (81 - 88)  BP: 122/77 (20 Apr 2021 13:55) (106/66 - 122/77)  BP(mean): --  ABP: --  ABP(mean): --  RR: 16 (20 Apr 2021 13:55) (16 - 20)  SpO2: 93% (20 Apr 2021 13:55) (93% - 95%)           Input and Output:  I&O's Detail      Lab Data                        17.2   10.35 )-----------( 239      ( 20 Apr 2021 13:11 )             51.8     04-20    144  |  111<H>  |  20  ----------------------------<  112<H>  3.9   |  25  |  1.60<H>    Ca    9.2      20 Apr 2021 13:11    TPro  7.7  /  Alb  4.0  /  TBili  0.8  /  DBili  x   /  AST  28  /  ALT  60  /  AlkPhos  91  04-20    ABG - ( 20 Apr 2021 14:07 )  pH, Arterial: 7.41  pH, Blood: x     /  pCO2: 33    /  pO2: 69    / HCO3: 22.1  / Base Excess: -3.3  /  SaO2: ?92.8                   Review of Systems	  sob  dillon  desat      Objective     Physical Examination  heart s1s2  lung dec BS  abd soft  head nc  head at  cn grossly int  verbal  alert  left earlobe piercing        Pertinent Lab findings & Imaging      Purvis:  NO   Adequate UO     I&O's Detail           Discussed with:     Cultures:	        Radiology            EXAM:  XR CHEST PORTABLE IMMED 1V                            PROCEDURE DATE:  04/20/2021          INTERPRETATION:  AP chest on April 20, 2021 at 1:30 PM. Concern is aspiration.    Heart magnified by technique. Loop recorder over left chest again noted.    Present film shows minimal atelectasis off left heart border new since January 11, 2020.    IMPRESSION: Minimal atelectasis left lower lung field.            SINDI WRIGHT M.D., ATTENDING RADIOLOGIST  This document has been electronically signed. Apr 20 2021  1:10PM

## 2021-04-20 NOTE — H&P ADULT - HISTORY OF PRESENT ILLNESS
Mr Jeffries is a 64 yo M presented for elective colonoscopy and subsequently had an aspiration event. PMH Serrated polyposis syndrome, CAD, HTN, Aortic Aneurysm, GERD, COPD, Former Tobacco Dependence.   Patient seen and examined at bedside. He reports having a headache which is improving. He also felt some SOB right after vomiting, but has no current SOB. He has no other complaints.   Denies fevers, chills, light-headedness, dizziness, chest pain, palpitations, abdominal pain, constipation, diarrhea, melena, hematochezia, dysuria.   As per Anesthesia:   Upon administration of propofol and insertion of colonoscope, patient vomited yellowish tinged fluid from his oropharynx.  Oropharynx suctioned vigorously, patient was BMV for decreasing Sp02.  Procedure was aborted secondary to full stomach and possibility of continued vomiting.  Patient was taken to the holding area, vital signs are stable.

## 2021-04-20 NOTE — CONSULT NOTE ADULT - SUBJECTIVE AND OBJECTIVE BOX
66 y/o man with hx HTN, hyperlipidemia, CHF, CAD s/p stent, sessile serrated polyposis syndrome who presented to endoscopy suite for a surveillance colonoscopy.  Shortly after the start of colonoscopy - patient vomited and desaturated - Oxygen levels recovered.  Patient admitted for observation.                     ICU Vital Signs Last 24 Hrs  T(C): 36.7 (20 Apr 2021 13:55), Max: 36.7 (20 Apr 2021 13:55)  T(F): 98 (20 Apr 2021 13:55), Max: 98 (20 Apr 2021 13:55)  HR: 88 (20 Apr 2021 13:55) (81 - 88)  BP: 122/77 (20 Apr 2021 13:55) (106/66 - 122/77)  BP(mean): --  ABP: --  ABP(mean): --  RR: 16 (20 Apr 2021 13:55) (16 - 20)  SpO2: 93% (20 Apr 2021 13:55) (93% - 95%)                      17.2   10.35 )-----------( 239      ( 20 Apr 2021 13:11 )             51.8       EXAM:  XR CHEST PORTABLE IMMED 1V                            PROCEDURE DATE:  04/20/2021          INTERPRETATION:  AP chest on April 20, 2021 at 1:30 PM. Concern is aspiration.    Heart magnified by technique. Loop recorder over left chest again noted.    Present film shows minimal atelectasis off left heart border new since January 11, 2020.    IMPRESSION: Minimal atelectasis left lower lung field.            SINDI WRIGHT M.D., ATTENDING RADIOLOGIST  This document has been electronically signed. Apr 20 2021  1:10PM       64 y/o man with hx HTN, hyperlipidemia, CHF, CAD s/p stent, sessile serrated polyposis syndrome who presented to endoscopy suite for a surveillance colonoscopy.  Shortly after the start of colonoscopy - patient vomited and desaturated - Oxygen levels recovered.  Patient admitted for observation.                     ICU Vital Signs Last 24 Hrs  T(C): 36.7 (20 Apr 2021 13:55), Max: 36.7 (20 Apr 2021 13:55)  T(F): 98 (20 Apr 2021 13:55), Max: 98 (20 Apr 2021 13:55)  HR: 88 (20 Apr 2021 13:55) (81 - 88)  BP: 122/77 (20 Apr 2021 13:55) (106/66 - 122/77)  BP(mean): --  ABP: --  ABP(mean): --  RR: 16 (20 Apr 2021 13:55) (16 - 20)  SpO2: 93% (20 Apr 2021 13:55) (93% - 95%)      PHYSICAL EXAM:      Constitutional:  Comfortable appearing laying in bed  HEENT: NCAT, anicteric sclera, moist mucous membranes  Respiratory:  Decreased breath sounds in left lower bases  Cardiovascular:  nl S1, S2, no m/r/g  Gastrointestinal:  Soft, +BS, NT, ND  Extremities:  no E/C/C  Neurological:  A&O x 3.  Skin:  no Jaundice  Psychiatric:  Normal Mood and affect                    17.2   10.35 )-----------( 239      ( 20 Apr 2021 13:11 )             51.8       EXAM:  XR CHEST PORTABLE IMMED 1V                            PROCEDURE DATE:  04/20/2021          INTERPRETATION:  AP chest on April 20, 2021 at 1:30 PM. Concern is aspiration.    Heart magnified by technique. Loop recorder over left chest again noted.    Present film shows minimal atelectasis off left heart border new since January 11, 2020.    IMPRESSION: Minimal atelectasis left lower lung field.            SINDI WRIGHT M.D., ATTENDING RADIOLOGIST  This document has been electronically signed. Apr 20 2021  1:10PM

## 2021-04-20 NOTE — CONSULT NOTE ADULT - ASSESSMENT
IMPRESSION   Possible aspiration during colonoscopy  History of sessile serrated polyposis syndrome           PLAN:   Continue treatment as per medical team.  Follow up in the office with me.  IMPRESSION   Possible aspiration during colonoscopy  History of sessile serrated polyposis syndrome          PLAN:   Continue treatment as per medical team.  Follow up in the office with me.

## 2021-04-20 NOTE — H&P ADULT - NSICDXPASTMEDICALHX_GEN_ALL_CORE_FT
PAST MEDICAL HISTORY:  CAD (coronary artery disease)     COPD without exacerbation     HTN (hypertension)

## 2021-04-20 NOTE — H&P ADULT - ASSESSMENT
Mr Jeffries is a 66 yo M presented for elective colonoscopy and subsequently had an aspiration event. PMH Serrated polyposis syndrome, CAD, HTN, Aortic Aneurysm, GERD, COPD, Former Tobacco Dependence.     Aspiration Pneumonitis: Suspect aspiration pneumonitis, but could also be aspiration PNA however too early to tell as event just occurred today.   -CXR reviewed.   -trend WBC count and monitor for fevers  -will start empirically on Zosyn  -Pulmonology consulted  -continuous pulse ox    KINGSTON: Cr 1.60, likely due to component of dehydration 2/2 to prep for colonoscopy  -will give gentle IVF  -repeat BMP in AM  -hold nephrotoxic medications    COPD: no acute exacerbation  -continue Symbicort  -albuterol PRN    HTN: BP stable and at goal  -resume anti-htn meds    CAD s/p stent: continue ASA, beta blocker, statin    Aortic Aneurysm: followed by CT surgery and and his cardiologist  -recommended to follow up as outpatient    Serrated Polyposis syndrome:   -followed by Dr. Ya  -recommend to schedule appt with GI within 4 weeks of discharge to reschedule colonoscopy.     GERD: continue PPI    DVT ppx: SCD's     Mr Jeffries is a 66 yo M presented for elective colonoscopy and subsequently had an aspiration event. PMH Serrated polyposis syndrome, CAD, HTN, Aortic Aneurysm, GERD, COPD, Former Tobacco Dependence.     Aspiration Pneumonitis: Suspect aspiration pneumonitis, but could also be aspiration PNA however too early to tell as event just occurred today.   -CXR reviewed.   -trend WBC count and monitor for fevers  -will start empirically on Zosyn  -Pulmonology consulted  -continuous pulse ox  -will hold Xanax     KINGSTON: Cr 1.60, likely due to component of dehydration 2/2 to prep for colonoscopy  -will give gentle IVF  -repeat BMP in AM  -hold nephrotoxic medications [holding enalapril]    COPD: no acute exacerbation  -continue Symbicort [counselled on correct usage, was taking PRN]  -albuterol PRN    HTN: BP stable and at goal  -resume anti-htn meds    CAD s/p stent: continue ASA, beta blocker [will place on Lopressor while inpatient, normally takes Bystolic at home], statin    Aortic Aneurysm: followed by CT surgery and and his cardiologist  -recommended to follow up as outpatient    Serrated Polyposis syndrome:   -followed by Dr. Ya  -recommend to schedule appt with GI within 4 weeks of discharge to reschedule colonoscopy.     GERD: continue PPI.  -will hold Pepcid due to KINGSTON    DVT ppx: SCD's and heparin  IMPROVE VTE Individual Risk Assessment    RISK                                                                Points    [  ] Previous VTE                                                  3    [  ] Thrombophilia                                               2    [  ] Lower limb paralysis                                      2        (unable to hold up >15 seconds)      [  ] Current Cancer                                              2         (within 6 months)    [  ] Immobilization > 24 hrs                                1    [  ] ICU/CCU stay > 24 hours                              1    [X  ] Age > 60                                                      1    IMPROVE VTE Score _____1____    IMPROVE Score 0-1: Low Risk, No VTE prophylaxis required for most patients, encourage ambulation.   IMPROVE Score 2-3: At risk, pharmacologic VTE prophylaxis is indicated for most patients (in the absence of a contraindication)  IMPROVE Score > or = 4: High Risk, pharmacologic VTE prophylaxis is indicated for most patients (in the absence of a contraindication)   Mr Jeffries is a 66 yo M presented for elective colonoscopy and subsequently had an aspiration event. PMH Serrated polyposis syndrome, CAD, HTN, Aortic Aneurysm, GERD, COPD, Former Tobacco Dependence.     Aspiration Pneumonitis: Suspect aspiration pneumonitis, but could also be aspiration PNA however too early to tell as event just occurred today.   -CXR reviewed.   -trend WBC count and monitor for fevers  -will start empirically on Zosyn  -Pulmonology consulted  -continuous pulse ox  -will hold Xanax     KINGSTON: Cr 1.60, likely due to component of dehydration 2/2 to prep for colonoscopy  -will give gentle IVF  -repeat BMP in AM  -hold nephrotoxic medications [patient no longer takes enalapril he states]    COPD: no acute exacerbation  -continue Symbicort [counselled on correct usage, was taking PRN]  -albuterol PRN    HTN: BP stable and at goal  -resume anti-htn meds    CAD s/p stent: continue ASA, beta blocker [will place on Lopressor while inpatient, normally takes Bystolic at home], statin    Aortic Aneurysm: followed by CT surgery and and his cardiologist  -recommended to follow up as outpatient    Serrated Polyposis syndrome:   -followed by Dr. Ya  -recommend to schedule appt with GI within 4 weeks of discharge to reschedule colonoscopy.     GERD: continue PPI.  -will hold Pepcid due to KINGSTON    DVT ppx: SCD's and heparin  IMPROVE VTE Individual Risk Assessment    RISK                                                                Points    [  ] Previous VTE                                                  3    [  ] Thrombophilia                                               2    [  ] Lower limb paralysis                                      2        (unable to hold up >15 seconds)      [  ] Current Cancer                                              2         (within 6 months)    [  ] Immobilization > 24 hrs                                1    [  ] ICU/CCU stay > 24 hours                              1    [X  ] Age > 60                                                      1    IMPROVE VTE Score _____1____    IMPROVE Score 0-1: Low Risk, No VTE prophylaxis required for most patients, encourage ambulation.   IMPROVE Score 2-3: At risk, pharmacologic VTE prophylaxis is indicated for most patients (in the absence of a contraindication)  IMPROVE Score > or = 4: High Risk, pharmacologic VTE prophylaxis is indicated for most patients (in the absence of a contraindication)

## 2021-04-20 NOTE — H&P ADULT - NSICDXFAMILYHX_GEN_ALL_CORE_FT
FAMILY HISTORY:  Father  Still living? Unknown  FH: COPD (chronic obstructive pulmonary disease), Age at diagnosis: Age Unknown    Mother  Still living? Unknown  FH: COPD (chronic obstructive pulmonary disease), Age at diagnosis: Age Unknown

## 2021-04-20 NOTE — H&P ADULT - NSHPPHYSICALEXAM_GEN_ALL_CORE
T(C): 36.7 (04-20-21 @ 13:55), Max: 36.7 (04-20-21 @ 13:55)  HR: 88 (04-20-21 @ 13:55) (81 - 88)  BP: 122/77 (04-20-21 @ 13:55) (106/66 - 122/77)  RR: 16 (04-20-21 @ 13:55) (16 - 20)  SpO2: 93% (04-20-21 @ 13:55) (93% - 95%)  Wt(kg): --    Physical Exam:   GENERAL: well-groomed, well-developed, NAD  HEENT: head NC/AT; EOM intact, conjunctiva & sclera clear; hearing grossly intact, moist mucous membranes  NECK: supple, no JVD  RESPIRATORY: Wheezing at left lung base, no rales or rhonchi  CARDIOVASCULAR: S1&S2, RRR, no murmurs or gallops  ABDOMEN: soft, non-tender, non-distended, + Bowel sounds x4 quadrants, no guarding, rebound or rigidity  MUSCULOSKELETAL:  no clubbing, cyanosis or edema of all 4 extremities  LYMPH: no cervical lymphadenopathy  VASCULAR: Radial pulses 2+ bilaterally, no varicose veins   SKIN: warm and dry, color normal  NEUROLOGIC: AA&O X3, CN2-12 intact w/ no focal deficits, no sensory loss, motor Strength 5/5 in UE & LE B/L  Psych: Normal mood and affect, normal behavior

## 2021-04-21 ENCOUNTER — TRANSCRIPTION ENCOUNTER (OUTPATIENT)
Age: 66
End: 2021-04-21

## 2021-04-21 VITALS
TEMPERATURE: 98 F | OXYGEN SATURATION: 94 % | SYSTOLIC BLOOD PRESSURE: 154 MMHG | DIASTOLIC BLOOD PRESSURE: 88 MMHG | RESPIRATION RATE: 18 BRPM | HEART RATE: 66 BPM

## 2021-04-21 LAB
ANION GAP SERPL CALC-SCNC: 10 MMOL/L — SIGNIFICANT CHANGE UP (ref 5–17)
BASOPHILS # BLD AUTO: 0.04 K/UL — SIGNIFICANT CHANGE UP (ref 0–0.2)
BASOPHILS NFR BLD AUTO: 0.3 % — SIGNIFICANT CHANGE UP (ref 0–2)
BUN SERPL-MCNC: 22 MG/DL — SIGNIFICANT CHANGE UP (ref 7–23)
CALCIUM SERPL-MCNC: 8.7 MG/DL — SIGNIFICANT CHANGE UP (ref 8.5–10.1)
CHLORIDE SERPL-SCNC: 113 MMOL/L — HIGH (ref 96–108)
CO2 SERPL-SCNC: 24 MMOL/L — SIGNIFICANT CHANGE UP (ref 22–31)
COVID-19 SPIKE DOMAIN AB INTERP: NEGATIVE — SIGNIFICANT CHANGE UP
COVID-19 SPIKE DOMAIN ANTIBODY RESULT: 0.4 U/ML — SIGNIFICANT CHANGE UP
CREAT SERPL-MCNC: 1.2 MG/DL — SIGNIFICANT CHANGE UP (ref 0.5–1.3)
CRP SERPL-MCNC: 8 MG/L — HIGH
EOSINOPHIL # BLD AUTO: 0.61 K/UL — HIGH (ref 0–0.5)
EOSINOPHIL NFR BLD AUTO: 5.1 % — SIGNIFICANT CHANGE UP (ref 0–6)
GLUCOSE SERPL-MCNC: 103 MG/DL — HIGH (ref 70–99)
HCT VFR BLD CALC: 45.3 % — SIGNIFICANT CHANGE UP (ref 39–50)
HCV AB S/CO SERPL IA: 0.12 S/CO — SIGNIFICANT CHANGE UP (ref 0–0.99)
HCV AB SERPL-IMP: SIGNIFICANT CHANGE UP
HGB BLD-MCNC: 15.2 G/DL — SIGNIFICANT CHANGE UP (ref 13–17)
IMM GRANULOCYTES NFR BLD AUTO: 0.3 % — SIGNIFICANT CHANGE UP (ref 0–1.5)
LYMPHOCYTES # BLD AUTO: 2.52 K/UL — SIGNIFICANT CHANGE UP (ref 1–3.3)
LYMPHOCYTES # BLD AUTO: 21 % — SIGNIFICANT CHANGE UP (ref 13–44)
MAGNESIUM SERPL-MCNC: 2.3 MG/DL — SIGNIFICANT CHANGE UP (ref 1.6–2.6)
MCHC RBC-ENTMCNC: 29.5 PG — SIGNIFICANT CHANGE UP (ref 27–34)
MCHC RBC-ENTMCNC: 33.6 GM/DL — SIGNIFICANT CHANGE UP (ref 32–36)
MCV RBC AUTO: 88 FL — SIGNIFICANT CHANGE UP (ref 80–100)
MONOCYTES # BLD AUTO: 0.93 K/UL — HIGH (ref 0–0.9)
MONOCYTES NFR BLD AUTO: 7.8 % — SIGNIFICANT CHANGE UP (ref 2–14)
NEUTROPHILS # BLD AUTO: 7.84 K/UL — HIGH (ref 1.8–7.4)
NEUTROPHILS NFR BLD AUTO: 65.5 % — SIGNIFICANT CHANGE UP (ref 43–77)
NRBC # BLD: 0 /100 WBCS — SIGNIFICANT CHANGE UP (ref 0–0)
PLATELET # BLD AUTO: 201 K/UL — SIGNIFICANT CHANGE UP (ref 150–400)
POTASSIUM SERPL-MCNC: 3.4 MMOL/L — LOW (ref 3.5–5.3)
POTASSIUM SERPL-SCNC: 3.4 MMOL/L — LOW (ref 3.5–5.3)
RBC # BLD: 5.15 M/UL — SIGNIFICANT CHANGE UP (ref 4.2–5.8)
RBC # FLD: 13.1 % — SIGNIFICANT CHANGE UP (ref 10.3–14.5)
SARS-COV-2 IGG+IGM SERPL QL IA: 0.4 U/ML — SIGNIFICANT CHANGE UP
SARS-COV-2 IGG+IGM SERPL QL IA: NEGATIVE — SIGNIFICANT CHANGE UP
SODIUM SERPL-SCNC: 147 MMOL/L — HIGH (ref 135–145)
TSH SERPL-MCNC: 0.86 UIU/ML — SIGNIFICANT CHANGE UP (ref 0.36–3.74)
WBC # BLD: 11.98 K/UL — HIGH (ref 3.8–10.5)
WBC # FLD AUTO: 11.98 K/UL — HIGH (ref 3.8–10.5)

## 2021-04-21 PROCEDURE — 71045 X-RAY EXAM CHEST 1 VIEW: CPT

## 2021-04-21 PROCEDURE — 85025 COMPLETE CBC W/AUTO DIFF WBC: CPT

## 2021-04-21 PROCEDURE — 99239 HOSP IP/OBS DSCHRG MGMT >30: CPT

## 2021-04-21 PROCEDURE — 82803 BLOOD GASES ANY COMBINATION: CPT

## 2021-04-21 PROCEDURE — 36415 COLL VENOUS BLD VENIPUNCTURE: CPT

## 2021-04-21 PROCEDURE — 83605 ASSAY OF LACTIC ACID: CPT

## 2021-04-21 PROCEDURE — 86769 SARS-COV-2 COVID-19 ANTIBODY: CPT

## 2021-04-21 PROCEDURE — 86140 C-REACTIVE PROTEIN: CPT

## 2021-04-21 PROCEDURE — 80048 BASIC METABOLIC PNL TOTAL CA: CPT

## 2021-04-21 PROCEDURE — 71046 X-RAY EXAM CHEST 2 VIEWS: CPT | Mod: 26

## 2021-04-21 PROCEDURE — 83735 ASSAY OF MAGNESIUM: CPT

## 2021-04-21 PROCEDURE — 85610 PROTHROMBIN TIME: CPT

## 2021-04-21 PROCEDURE — 86803 HEPATITIS C AB TEST: CPT

## 2021-04-21 PROCEDURE — 85730 THROMBOPLASTIN TIME PARTIAL: CPT

## 2021-04-21 PROCEDURE — 71046 X-RAY EXAM CHEST 2 VIEWS: CPT

## 2021-04-21 PROCEDURE — 94640 AIRWAY INHALATION TREATMENT: CPT

## 2021-04-21 PROCEDURE — 84443 ASSAY THYROID STIM HORMONE: CPT

## 2021-04-21 PROCEDURE — 80053 COMPREHEN METABOLIC PANEL: CPT

## 2021-04-21 RX ORDER — POTASSIUM CHLORIDE 20 MEQ
40 PACKET (EA) ORAL EVERY 4 HOURS
Refills: 0 | Status: COMPLETED | OUTPATIENT
Start: 2021-04-21 | End: 2021-04-21

## 2021-04-21 RX ORDER — LACTOBACILLUS ACIDOPHILUS 100MM CELL
1 CAPSULE ORAL THREE TIMES A DAY
Refills: 0 | Status: DISCONTINUED | OUTPATIENT
Start: 2021-04-21 | End: 2021-04-21

## 2021-04-21 RX ADMIN — Medication 12.5 MILLIGRAM(S): at 05:24

## 2021-04-21 RX ADMIN — Medication 1 GRAM(S): at 05:25

## 2021-04-21 RX ADMIN — Medication 40 MILLIEQUIVALENT(S): at 13:32

## 2021-04-21 RX ADMIN — Medication 1 GRAM(S): at 12:44

## 2021-04-21 RX ADMIN — HEPARIN SODIUM 5000 UNIT(S): 5000 INJECTION INTRAVENOUS; SUBCUTANEOUS at 13:32

## 2021-04-21 RX ADMIN — Medication 81 MILLIGRAM(S): at 12:44

## 2021-04-21 RX ADMIN — AMLODIPINE BESYLATE 5 MILLIGRAM(S): 2.5 TABLET ORAL at 05:24

## 2021-04-21 RX ADMIN — HEPARIN SODIUM 5000 UNIT(S): 5000 INJECTION INTRAVENOUS; SUBCUTANEOUS at 05:23

## 2021-04-21 RX ADMIN — Medication 40 MILLIEQUIVALENT(S): at 09:15

## 2021-04-21 RX ADMIN — Medication 650 MILLIGRAM(S): at 00:05

## 2021-04-21 RX ADMIN — PANTOPRAZOLE SODIUM 40 MILLIGRAM(S): 20 TABLET, DELAYED RELEASE ORAL at 05:24

## 2021-04-21 RX ADMIN — PIPERACILLIN AND TAZOBACTAM 25 GRAM(S): 4; .5 INJECTION, POWDER, LYOPHILIZED, FOR SOLUTION INTRAVENOUS at 09:16

## 2021-04-21 RX ADMIN — Medication 1 TABLET(S): at 13:32

## 2021-04-21 RX ADMIN — BUDESONIDE AND FORMOTEROL FUMARATE DIHYDRATE 2 PUFF(S): 160; 4.5 AEROSOL RESPIRATORY (INHALATION) at 05:25

## 2021-04-21 NOTE — DISCHARGE NOTE PROVIDER - HOSPITAL COURSE
FROM ADMISSION H+P:   HPI:  Mr Jeffries is a 66 yo M presented for elective colonoscopy and subsequently had an aspiration event. PMH Serrated polyposis syndrome, CAD, HTN, Aortic Aneurysm, GERD, COPD, Former Tobacco Dependence.   Patient seen and examined at bedside. He reports having a headache which is improving. He also felt some SOB right after vomiting, but has no current SOB. He has no other complaints.   Denies fevers, chills, light-headedness, dizziness, chest pain, palpitations, abdominal pain, constipation, diarrhea, melena, hematochezia, dysuria.   As per Anesthesia:   Upon administration of propofol and insertion of colonoscope, patient vomited yellowish tinged fluid from his oropharynx.  Oropharynx suctioned vigorously, patient was BMV for decreasing Sp02.  Procedure was aborted secondary to full stomach and possibility of continued vomiting.  Patient was taken to the holding area, vital signs are stable.   (20 Apr 2021 13:10)      ---  HOSPITAL COURSE:   Pt was admitted for suspected aspiration pneumonitis. CXR showed minimal atelectasis in the left lower lobe. Patient was started on IV Zosyn for poss aspiration pna. Pt's home Xanax was held. Pulm(Dr. Phillips) was consulted. Repeat CXR showed _____. Pt improved and is stable for dc to home and follow up with GI Dr. Ya within 4 weeks to reschedule colonoscopy       Patient was seen and examined on day of discharge:    Patient's clinical status improved during her admission and is stable for discharge to _____.  ---  CONSULTANTS:   [place](Dr. Phillips)    ---      FINAL DISCHARGE DIAGNOSIS LIST:  Please see last daily progress note for final discharge diagnoses    ---    Physical exam on day of discharge:    T(C): 36.6 (04-21-21 @ 04:43), Max: 36.9 (04-20-21 @ 21:10)  HR: 65 (04-21-21 @ 04:43) (65 - 88)  BP: 117/74 (04-21-21 @ 04:43) (106/66 - 122/77)  RR: 18 (04-21-21 @ 04:43) (16 - 20)  SpO2: 90% (04-21-21 @ 04:43) (90% - 95%)    Physical Exam:  General: Well developed, well nourished, no apparent distress  HEENT: Normocephalic, atraumatic, PERRLA, EOMI bilateral, moist mucous membranes   Neck: Supple, nontender, no mass  Neurology: Alert and oriented x3, nonfocal, sensation intact   Respiratory: Clear to auscultation bilateral, no wheezing, rales or ronchi   Cardio: S1,S2, no murmurs, rubs or gallops  Abdominal: Soft, non-tender, non-distended bowel sounds present x4, no palpable masses  Extremities: No clubbing, cyanosis or edema, peripheral pulses present  MSK: Normal range of motion, no joint erythema or warmth, no joint swelling   Heme: No obvious ecchymosis or petechiae   Skin: warm, dry, normal color FROM ADMISSION H+P:   HPI:  Mr Jeffries is a 66 yo M presented for elective colonoscopy and subsequently had an aspiration event. PMH Serrated polyposis syndrome, CAD, HTN, Aortic Aneurysm, GERD, COPD, Former Tobacco Dependence.   Patient seen and examined at bedside. He reports having a headache which is improving. He also felt some SOB right after vomiting, but has no current SOB. He has no other complaints.   Denies fevers, chills, light-headedness, dizziness, chest pain, palpitations, abdominal pain, constipation, diarrhea, melena, hematochezia, dysuria.   As per Anesthesia:   Upon administration of propofol and insertion of colonoscope, patient vomited yellowish tinged fluid from his oropharynx.  Oropharynx suctioned vigorously, patient was BMV for decreasing Sp02.  Procedure was aborted secondary to full stomach and possibility of continued vomiting.  Patient was taken to the holding area, vital signs are stable.   (20 Apr 2021 13:10)      ---  HOSPITAL COURSE:   Pt was admitted for suspected aspiration pneumonitis. CXR showed minimal atelectasis in the left lower lobe. Patient was started on IV Zosyn for poss aspiration pna. Pt's home Xanax was held. Pulm(Dr. Phillips) was consulted. Repeat CXR showed _____. Pt was found to have KINGSTON on admission, likely 2/2 colonoscopy prep, which improved on day of discharge. Pt improved and is stable for dc to home and follow up with GI Dr. Ya within 4 weeks to reschedule colonoscopy       Patient was seen and examined on day of discharge:    Patient's clinical status improved during her admission and is stable for discharge to _____.  ---  CONSULTANTS:   Pulm(Dr. Phillips)    ---      FINAL DISCHARGE DIAGNOSIS LIST:  Please see last daily progress note for final discharge diagnoses    ---    Physical exam on day of discharge:    T(C): 36.6 (04-21-21 @ 04:43), Max: 36.9 (04-20-21 @ 21:10)  HR: 65 (04-21-21 @ 04:43) (65 - 88)  BP: 117/74 (04-21-21 @ 04:43) (106/66 - 122/77)  RR: 18 (04-21-21 @ 04:43) (16 - 20)  SpO2: 90% (04-21-21 @ 04:43) (90% - 95%)    Physical Exam:  General: Well developed, well nourished, no apparent distress  HEENT: Normocephalic, atraumatic, PERRLA, EOMI bilateral, moist mucous membranes   Neck: Supple, nontender, no mass  Neurology: Alert and oriented x3, nonfocal, sensation intact   Respiratory: Clear to auscultation bilateral, no wheezing, rales or ronchi   Cardio: S1,S2, no murmurs, rubs or gallops  Abdominal: Soft, non-tender, non-distended bowel sounds present x4, no palpable masses  Extremities: No clubbing, cyanosis or edema, peripheral pulses present  MSK: Normal range of motion, no joint erythema or warmth, no joint swelling   Heme: No obvious ecchymosis or petechiae   Skin: warm, dry, normal color FROM ADMISSION H+P:   HPI:  Mr Jeffries is a 66 yo M presented for elective colonoscopy and subsequently had an aspiration event. PMH Serrated polyposis syndrome, CAD, HTN, Aortic Aneurysm, GERD, COPD, Former Tobacco Dependence.   Patient seen and examined at bedside. He reports having a headache which is improving. He also felt some SOB right after vomiting, but has no current SOB. He has no other complaints.   Denies fevers, chills, light-headedness, dizziness, chest pain, palpitations, abdominal pain, constipation, diarrhea, melena, hematochezia, dysuria.   As per Anesthesia:   Upon administration of propofol and insertion of colonoscope, patient vomited yellowish tinged fluid from his oropharynx.  Oropharynx suctioned vigorously, patient was BMV for decreasing Sp02.  Procedure was aborted secondary to full stomach and possibility of continued vomiting.  Patient was taken to the holding area, vital signs are stable.   (20 Apr 2021 13:10)      ---  HOSPITAL COURSE:   Pt was admitted for suspected aspiration pneumonitis. CXR showed minimal atelectasis in the left lower lobe. Patient was started on IV Zosyn for poss aspiration pna. Pt's home Xanax was held. Pulm(Dr. Phillips) was consulted. Repeat CXR was done. Pt was found to have K[place] on admission, likely 2/2 colonoscopy prep, which improved on day of discharge, Cr back to normal range prior to dc. Pt improved and is stable for dc to home and follow up with GI Dr. Ya within 4 weeks to reschedule colonoscopy   DC on augmentin PO for possible asp pna.    Patient was seen and examined on day of discharge:  O2 sat on room air at rest: 94%  O2 sat on room air with ambulation: 94% (pt had no shortness of breath with ambulation and said he feels well)  Pt has spoken to his PMD and plans to see him in next few days    Vital Signs Last 24 Hrs  T(C): 36.8 (21 Apr 2021 12:59), Max: 36.9 (20 Apr 2021 21:10)  T(F): 98.3 (21 Apr 2021 12:59), Max: 98.5 (20 Apr 2021 21:10)  HR: 66 (21 Apr 2021 12:59) (65 - 88)  BP: 154/88 (21 Apr 2021 12:59) (106/66 - 154/88)  BP(mean): --  RR: 18 (21 Apr 2021 12:59) (16 - 20)  SpO2: 94% (21 Apr 2021 12:59) (90% - 95%)    GENERAL: well-groomed, well-developed, NAD  HEENT: head NC/AT; EOM intact, conjunctiva & sclera clear; hearing grossly intact, moist mucous membranes  NECK: supple, no JVD  RESPIRATORY: CTABL, no wheeze appreciated, no rhonchi  CARDIOVASCULAR: S1&S2, RRR, no murmurs or gallops  ABDOMEN: soft, non-tender, non-distended, + Bowel sounds x4 quadrants, no guarding, rebound or rigidity  MUSCULOSKELETAL:  no clubbing, cyanosis or edema of all 4 extremities  VASCULAR: Radial pulses 2+ bilaterally, no varicose veins   SKIN: warm and dry, color normal  NEUROLOGIC: AA&O X3, CN2-12 intact w/ no focal deficits, no sensory loss, motor Strength 5/5 in UE & LE B/L  Psych: Normal mood and affect, normal behavior    Patient's clinical status improved during her admission and is stable for discharge to home  ---  CONSULTANTS:   Pulm(Dr. Phillips)    ---      FINAL DISCHARGE DIAGNOSIS LIST:  Please see last daily progress note for final discharge diagnoses    ---    Physical exam on day of discharge:    T(C): 36.6 (04-21-21 @ 04:43), Max: 36.9 (04-20-21 @ 21:10)  HR: 65 (04-21-21 @ 04:43) (65 - 88)  BP: 117/74 (04-21-21 @ 04:43) (106/66 - 122/77)  RR: 18 (04-21-21 @ 04:43) (16 - 20)  SpO2: 90% (04-21-21 @ 04:43) (90% - 95%)    Physical Exam:  General: Well developed, well nourished, no apparent distress  HEENT: Normocephalic, atraumatic, PERRLA, EOMI bilateral, moist mucous membranes   Neck: Supple, nontender, no mass  Neurology: Alert and oriented x3, nonfocal, sensation intact   Respiratory: Clear to auscultation bilateral, no wheezing, rales or ronchi   Cardio: S1,S2, no murmurs, rubs or gallops  Abdominal: Soft, non-tender, non-distended bowel sounds present x4, no palpable masses  Extremities: No clubbing, cyanosis or edema, peripheral pulses present  MSK: Normal range of motion, no joint erythema or warmth, no joint swelling   Heme: No obvious ecchymosis or petechiae   Skin: warm, dry, normal color

## 2021-04-21 NOTE — DISCHARGE NOTE PROVIDER - PROVIDER TOKENS
PROVIDER:[TOKEN:[[id number]],ESTABLISHEDPATIENT:[T]],PROVIDER:[[id number]],ESTABLISHEDPATIENT:[T]] PROVIDER:[TOKEN:[[id number]],FOLLOWUP:[2 weeks],ESTABLISHEDPATIENT:[T]],PROVIDER:[TOKEN:[[id number]],FOLLOWUP:[1-3 days],ESTABLISHEDPATIENT:[T]]

## 2021-04-21 NOTE — DISCHARGE NOTE NURSING/CASE MANAGEMENT/SOCIAL WORK - PATIENT PORTAL LINK FT
You can access the FollowMyHealth Patient Portal offered by [facility] by registering at the following website: [email or web address]. By joining [facility]’s FollowMyHealth portal, you will also be able to view your health information using other applications (apps) compatible with our system.

## 2021-04-21 NOTE — DISCHARGE NOTE PROVIDER - NSDCCPCAREPLAN_GEN_ALL_CORE_FT
PRINCIPAL DISCHARGE DIAGNOSIS  Diagnosis: Aspiration pneumonitis  Assessment and Plan of Treatment: You were admitted for aspiration pneumonitis. You were started on IV antibiotics. Your shortness of breath and cough had improved.   -Recommend START anbiotic ______      SECONDARY DISCHARGE DIAGNOSES  Diagnosis: KINGSTON (acute kidney injury)  Assessment and Plan of Treatment: You were found to have some kidney injury which was likely due to dehydration from the colonoscopy prep. Your home Enalapril was held. Your kidney function improved. You may resume your home Enalapril    Diagnosis: HTN (hypertension)  Assessment and Plan of Treatment: Continue your home medications for blood pressure     PRINCIPAL DISCHARGE DIAGNOSIS  Diagnosis: Aspiration pneumonitis  Assessment and Plan of Treatment: You were admitted for aspiration pneumonitis. You were started on IV antibiotics in the hospital. Your shortness of breath and- augmentin twice daily for 4 additional days. Also recommend you continue taking probiotics during this time.  use albuterol (ventolin) as needed      SECONDARY DISCHARGE DIAGNOSES  Diagnosis: KINGSTON (acute kidney injury)  Assessment and Plan of Treatment: You were found to have some kidney injury which was likely due to dehydration from the colonoscopy prep. Creatinine (kidney function) returned to baseline.    Diagnosis: HTN (hypertension)  Assessment and Plan of Treatment: Continue your home medications for blood pressure

## 2021-04-21 NOTE — DISCHARGE NOTE PROVIDER - NSDCMRMEDTOKEN_GEN_ALL_CORE_FT
ALPRAZolam 0.5 mg oral tablet: 1 tab(s) orally 3 times a day, As Needed  amLODIPine 5 mg oral tablet: 1 tab(s) orally once a day  aspirin 81 mg oral tablet: 1 tab(s) orally once a day  Bystolic 10 mg oral tablet: 1 tab(s) orally once a day  famotidine 40 mg oral tablet: 1 tab(s) orally once a day (at bedtime)  Lipitor 20 mg oral tablet: 1 tab(s) orally once a day  multivitamin: 1 tab(s) orally once a day  omeprazole 40 mg oral delayed release capsule: 1 cap(s) orally once a day  Probiotic Formula oral capsule: 1 cap(s) orally once a day  sucralfate 1 g oral tablet: 1 tab(s) orally 4 times a day (before meals and at bedtime)  Ventolin HFA 90 mcg/inh inhalation aerosol: 2 puff(s) inhaled every 4 hours, As Needed   ALPRAZolam 0.5 mg oral tablet: 1 tab(s) orally 3 times a day, As Needed  amLODIPine 5 mg oral tablet: 1 tab(s) orally once a day  aspirin 81 mg oral tablet: 1 tab(s) orally once a day  Augmentin 875 mg-125 mg oral tablet: 1 tab(s) orally every 12 hours   Bystolic 10 mg oral tablet: 1 tab(s) orally once a day  famotidine 40 mg oral tablet: 1 tab(s) orally once a day (at bedtime)  Lipitor 20 mg oral tablet: 1 tab(s) orally once a day  multivitamin: 1 tab(s) orally once a day  omeprazole 40 mg oral delayed release capsule: 1 cap(s) orally once a day  Probiotic Formula oral capsule: 1 cap(s) orally once a day  sucralfate 1 g oral tablet: 1 tab(s) orally 4 times a day (before meals and at bedtime)  Ventolin HFA 90 mcg/inh inhalation aerosol: 2 puff(s) inhaled every 4 hours, As Needed

## 2021-04-21 NOTE — DISCHARGE NOTE PROVIDER - CARE PROVIDER_API CALL
Joann Sy)  Gastroenterology; Internal Medicine  [address] [facility], [place] [address]  Phone: [phone]  Fax: [phone]  Established Patient  Follow Up Time:     NUNO ACOSTA  Internal Medicine  [address] [place] [address]  Phone: [phone]  Fax: [phone]  Established Patient  Follow Up Time:    Joann Sy)  Gastroenterology; Internal Medicine  [address] [facility], [facility] [place] [address]  Phone: [phone]  Fax: [phone]  Established Patient  Follow Up Time: 2 weeks    NUNO ACOSTA  Internal Medicine  [address] [place] [address]  Phone: [phone]  Fax: [phone]  Established Patient  Follow Up Time: 1-3 days

## 2021-04-21 NOTE — DISCHARGE NOTE PROVIDER - NSDCFUADDAPPT_GEN_ALL_CORE_FT
Recommend follow up with your Gastroenterologist Dr. Ya within 4 weeks of discharge to schedule a colonoscopy

## 2021-04-21 NOTE — PROGRESS NOTE ADULT - SUBJECTIVE AND OBJECTIVE BOX
Patient with continued oxygen requirement and wheezing.  Given his history of COPD, patient given duonebs.  Patient also to have chest xray and ABG.  Spoke with Dr. Phillips, and Dr. Martin Ortiz in regards to treatment evaluation of possible aspiration evaluation.  Dr. Ortiz states he will admit the patient and Dr. Phillips will evaluate the patient.  This was explained to the patient and all questions answered.  
Date/Time Patient Seen:  		  Referring MD:   Data Reviewed	       Patient is a 65y old  Male who presents with a chief complaint of Aspiration PNA (20 Apr 2021 18:54)      Subjective/HPI     PAST MEDICAL & SURGICAL HISTORY:  HTN (hypertension)    CAD (coronary artery disease)    COPD without exacerbation    No significant past surgical history    S/P hernia repair          Medication list         MEDICATIONS  (STANDING):  amLODIPine   Tablet 5 milliGRAM(s) Oral daily  aspirin  chewable 81 milliGRAM(s) Oral daily  atorvastatin 20 milliGRAM(s) Oral at bedtime  budesonide  80 MICROgram(s)/formoterol 4.5 MICROgram(s) Inhaler 2 Puff(s) Inhalation two times a day  heparin   Injectable 5000 Unit(s) SubCutaneous every 8 hours  metoprolol tartrate 12.5 milliGRAM(s) Oral daily  pantoprazole    Tablet 40 milliGRAM(s) Oral before breakfast  piperacillin/tazobactam IVPB.. 3.375 Gram(s) IV Intermittent every 8 hours  sodium chloride 0.9%. 1000 milliLiter(s) (60 mL/Hr) IV Continuous <Continuous>  sucralfate 1 Gram(s) Oral four times a day    MEDICATIONS  (PRN):  acetaminophen   Tablet .. 650 milliGRAM(s) Oral every 6 hours PRN Mild Pain (1 - 3)  ALBUTerol    90 MICROgram(s) HFA Inhaler 1 Puff(s) Inhalation every 6 hours PRN Shortness of Breath and/or Wheezing         Vitals log        ICU Vital Signs Last 24 Hrs  T(C): 36.6 (21 Apr 2021 04:43), Max: 36.9 (20 Apr 2021 21:10)  T(F): 97.8 (21 Apr 2021 04:43), Max: 98.5 (20 Apr 2021 21:10)  HR: 65 (21 Apr 2021 04:43) (65 - 88)  BP: 117/74 (21 Apr 2021 04:43) (106/66 - 122/77)  BP(mean): --  ABP: --  ABP(mean): --  RR: 18 (21 Apr 2021 04:43) (16 - 20)  SpO2: 90% (21 Apr 2021 04:43) (90% - 95%)           Input and Output:  I&O's Detail      Lab Data                        17.2   10.35 )-----------( 239      ( 20 Apr 2021 13:11 )             51.8     04-20    144  |  111<H>  |  20  ----------------------------<  112<H>  3.9   |  25  |  1.60<H>    Ca    9.2      20 Apr 2021 13:11    TPro  7.7  /  Alb  4.0  /  TBili  0.8  /  DBili  x   /  AST  28  /  ALT  60  /  AlkPhos  91  04-20    ABG - ( 20 Apr 2021 14:07 )  pH, Arterial: 7.41  pH, Blood: x     /  pCO2: 33    /  pO2: 69    / HCO3: 22.1  / Base Excess: -3.3  /  SaO2: ?92.8                   Review of Systems	      Objective     Physical Examination    heart s1s2  lung dc BS  abd soft  head nc  on RA  verbal  left earlobe piercing      Pertinent Lab findings & Imaging      Yaniv:  NO   Adequate UO     I&O's Detail           Discussed with:     Cultures:	        Radiology

## 2021-04-21 NOTE — ANESTHESIA FOLLOW-UP NOTE - NSEVALATIONFT_GEN_ALL_CORE
Upon administration of propofol and insertion of colonoscope, patient vomited yellowish tinged fluid from his oropharynx.  Oropharynx suctioned vigorously, patient was BMV for decreasing Sp02.  Procedure was aborted secondary to full stomach and possibility of continued vomiting.  Patient was taken to the holding area, vital signs are stable.
Patient seen and evaluated.  States that he feels much better, wheezing gone.  Patient relates that he felt acid reflux prior to the procedure yesterday and did not take his PPI.  Pt advised to report symptoms to Dr. Sy for further treatment and evaluation.

## 2021-04-21 NOTE — PROGRESS NOTE ADULT - ASSESSMENT
Tolerating RA - low normal Sat - check Sat on Exertion -    64 yo M presented for elective colonoscopy and subsequently had an aspiration event.   PMH Serrated polyposis syndrome, CAD, HTN, Aortic Aneurysm, GERD, COPD, Former Tobacco Dependence.   aspiration pneumonitis vs pna  will repeat CXR PA and LAT this am   monitor VS - possible de - escalation and conversion of ABX to PO in am -   will check biomarkers  I edin  monitor VS and HD and Sat  keep sat > 90 pct  PPI for GERD  Symbicort and Proventil PRN for COPD hx  GI eval noted  Anesthesia notes reviewed

## 2021-04-23 PROBLEM — I10 ESSENTIAL (PRIMARY) HYPERTENSION: Chronic | Status: ACTIVE | Noted: 2021-04-20

## 2021-04-27 ENCOUNTER — APPOINTMENT (OUTPATIENT)
Dept: CARE COORDINATION | Facility: HOME HEALTH | Age: 66
End: 2021-04-27
Payer: MEDICARE

## 2021-04-27 VITALS
HEART RATE: 63 BPM | DIASTOLIC BLOOD PRESSURE: 88 MMHG | RESPIRATION RATE: 17 BRPM | TEMPERATURE: 98.6 F | OXYGEN SATURATION: 97 % | SYSTOLIC BLOOD PRESSURE: 138 MMHG

## 2021-04-27 DIAGNOSIS — J69.0 PNEUMONITIS DUE TO INHALATION OF FOOD AND VOMIT: ICD-10-CM

## 2021-04-27 DIAGNOSIS — K21.9 GASTRO-ESOPHAGEAL REFLUX DISEASE W/OUT ESOPHAGITIS: ICD-10-CM

## 2021-04-27 DIAGNOSIS — E78.5 HYPERLIPIDEMIA, UNSPECIFIED: ICD-10-CM

## 2021-04-27 PROCEDURE — 99348 HOME/RES VST EST LOW MDM 30: CPT

## 2021-04-28 PROBLEM — K21.9 GERD (GASTROESOPHAGEAL REFLUX DISEASE): Status: ACTIVE | Noted: 2017-09-21

## 2021-04-28 PROBLEM — E78.5 HYPERLIPIDEMIA: Status: ACTIVE | Noted: 2017-07-03

## 2021-04-28 PROBLEM — J69.0 ASPIRATION PNEUMONIA: Status: ACTIVE | Noted: 2021-04-28

## 2021-04-28 RX ORDER — POLYETHYLENE GLYCOL 3350 AND ELECTROLYTES WITH LEMON FLAVOR 236; 22.74; 6.74; 5.86; 2.97 G/4L; G/4L; G/4L; G/4L; G/4L
236 POWDER, FOR SOLUTION ORAL
Qty: 1 | Refills: 0 | Status: DISCONTINUED | COMMUNITY
Start: 2021-01-21 | End: 2021-04-28

## 2021-04-28 RX ORDER — POLYETHYLENE GLYCOL 3350, SODIUM SULFATE, SODIUM CHLORIDE, POTASSIUM CHLORIDE, ASCORBIC ACID, SODIUM ASCORBATE 7.5-2.691G
100 KIT ORAL
Qty: 1 | Refills: 0 | Status: DISCONTINUED | COMMUNITY
Start: 2020-07-16 | End: 2021-04-28

## 2021-04-28 RX ORDER — POLYETHYLENE GLYCOL 3350, SODIUM SULFATE, SODIUM CHLORIDE, POTASSIUM CHLORIDE, ASCORBIC ACID, SODIUM ASCORBATE 7.5-2.691G
100 KIT ORAL
Qty: 1 | Refills: 0 | Status: DISCONTINUED | COMMUNITY
Start: 2020-09-10 | End: 2021-04-28

## 2021-04-28 RX ORDER — RANITIDINE 300 MG/1
300 TABLET ORAL TWICE DAILY
Qty: 60 | Refills: 3 | Status: DISCONTINUED | COMMUNITY
Start: 2017-10-06 | End: 2021-04-28

## 2021-04-28 RX ORDER — OMEPRAZOLE 40 MG/1
40 CAPSULE, DELAYED RELEASE ORAL TWICE DAILY
Qty: 60 | Refills: 2 | Status: DISCONTINUED | COMMUNITY
Start: 2017-10-19 | End: 2021-04-28

## 2021-04-28 RX ORDER — CARVEDILOL 25 MG/1
25 TABLET, FILM COATED ORAL DAILY
Refills: 0 | Status: DISCONTINUED | COMMUNITY
Start: 2017-09-21 | End: 2021-04-28

## 2021-04-28 RX ORDER — CIMETIDINE 200 MG/1
200 TABLET, FILM COATED ORAL TWICE DAILY
Qty: 60 | Refills: 6 | Status: DISCONTINUED | COMMUNITY
Start: 2019-12-27 | End: 2021-04-28

## 2021-04-28 RX ORDER — DEXLANSOPRAZOLE 60 MG/1
60 CAPSULE, DELAYED RELEASE ORAL DAILY
Qty: 30 | Refills: 3 | Status: DISCONTINUED | COMMUNITY
Start: 2017-10-06 | End: 2021-04-28

## 2021-04-28 RX ORDER — AMOXICILLIN AND CLAVULANATE POTASSIUM 875; 125 MG/1; MG/1
875-125 TABLET, COATED ORAL
Qty: 8 | Refills: 0 | Status: COMPLETED | COMMUNITY
Start: 2021-04-21

## 2021-04-28 RX ORDER — DEXLANSOPRAZOLE 60 MG/1
60 CAPSULE, DELAYED RELEASE ORAL DAILY
Qty: 30 | Refills: 3 | Status: DISCONTINUED | COMMUNITY
Start: 2019-12-27 | End: 2021-04-28

## 2021-04-28 RX ORDER — PEG-3350, SODIUM SULFATE, SODIUM CHLORIDE, POTASSIUM CHLORIDE, SODIUM ASCORBATE AND ASCORBIC ACID 7.5-2.691G
100 KIT ORAL
Qty: 1 | Refills: 0 | Status: DISCONTINUED | COMMUNITY
Start: 2021-01-29 | End: 2021-04-28

## 2021-04-28 RX ORDER — ESOMEPRAZOLE MAGNESIUM 40 MG/1
40 CAPSULE, DELAYED RELEASE ORAL DAILY
Qty: 30 | Refills: 6 | Status: DISCONTINUED | COMMUNITY
End: 2021-04-28

## 2021-04-28 NOTE — PHYSICAL EXAM
[No Respiratory Distress] : no respiratory distress  [No Accessory Muscle Use] : no accessory muscle use [Clear to Auscultation] : lungs were clear to auscultation bilaterally [Normal Rate] : normal rate  [Regular Rhythm] : with a regular rhythm [Normal S1, S2] : normal S1 and S2 [Pedal Pulses Present] : the pedal pulses are present [No Edema] : there was no peripheral edema [Soft] : abdomen soft [Non Tender] : non-tender [Non-distended] : non-distended [Normal Bowel Sounds] : normal bowel sounds [Normal] : affect was normal and insight and judgment were intact

## 2021-04-28 NOTE — HISTORY OF PRESENT ILLNESS
[FreeTextEntry1] : f/u hospitalization for PNA [de-identified] : as per inpatient d/c note: 66 yo M presented for elective colonoscopy and subsequently had an aspiration event. \par PMH Serrated polyposis syndrome, CAD, HTN, Aortic Aneurysm, GERD, COPD, Former Tobacco Dependence. \par aspiration pneumonitis vs pna\par Asp pna: denies fever, chills, worsening SOB, completed abx regimen\par CAD/HTN: denies CP, worsening SOB, compliant with meds\par COPD: denies worsening sob, has medication prn\par GERD/serrated polyps: patient reports going to make appt with GI for f/u\par \par TCM COVID-19 screening protocol reviewed with patient and/or caregiver and denies any signs and symptoms.  Patient and/or care giver denies any contact with a suspect or known COVID-19 case wtihin the last 14 days.  Patient and/or caregiver have tested negative for COVID-19. PAtient and/or caregiver does not live in an assisted living or skilled nursing facility.  PAtient and/or caregiver [place].\par \par \par Patient and/or caregiver verbalized understanding of the need to wear a mask or face covering during visit or can be provided with one if needed. PAtient and/or caregiver verbalized understanding that a temperature is to be taken on day of visit and if greater than 100 degrees F (37.8C) to inform the care navigator prior to visit. The patient and/or caregiver verbalized understanding that the care navigator will be in full PPE, will require a clear area to don/doff, and will have own waste bag to dispose of PPE in the home at the conclusion of the visit.\par

## 2021-04-28 NOTE — REVIEW OF SYSTEMS
[Negative] : Constitutional [FreeTextEntry5] : see hpi [FreeTextEntry6] : see hpi [FreeTextEntry7] : see hpi

## 2021-04-28 NOTE — PLAN
[FreeTextEntry1] : Vital connect trial patch placed with + consent. Team to f/u on status.\par Patient to f/u with PCP regarding bloodwork and f/u with GI regarding polyps\par  Reminded of CN/NP role and yellow card, advised to call with any questions/concerns, verbalized understanding

## 2021-05-10 ENCOUNTER — APPOINTMENT (OUTPATIENT)
Dept: GASTROENTEROLOGY | Facility: CLINIC | Age: 66
End: 2021-05-10
Payer: MEDICARE

## 2021-05-10 VITALS
WEIGHT: 193 LBS | OXYGEN SATURATION: 98 % | DIASTOLIC BLOOD PRESSURE: 80 MMHG | HEIGHT: 71 IN | BODY MASS INDEX: 27.02 KG/M2 | HEART RATE: 76 BPM | SYSTOLIC BLOOD PRESSURE: 140 MMHG

## 2021-05-10 DIAGNOSIS — K21.9 GASTRO-ESOPHAGEAL REFLUX DISEASE W/OUT ESOPHAGITIS: ICD-10-CM

## 2021-05-10 PROCEDURE — 99215 OFFICE O/P EST HI 40 MIN: CPT

## 2021-05-11 PROBLEM — K21.9 ACID REFLUX: Status: ACTIVE | Noted: 2017-09-29

## 2021-05-11 NOTE — HISTORY OF PRESENT ILLNESS
[de-identified] : 64 y/o father of two, with Hx of HTN, hyperlipidemia, CAD, CHF, aortic aneurysm, COPD not on home O2, former smoker who presents for follow up after recent aborted colonoscopy on April 2021, where he aspirated during the procedure and required hospital admission for aspiration pneumonia.  \par \par About two months ago he saw an endocrinologist for likely adrenal adenomas - ordered MRI however was told pancreas not clearly visualized. \par \par He continues to have upper abdominal pain - more aware of discomfort after eating.  Pain can increase to 10/10 on pain scale - eventually gets better.  \par \par Gets heartburn every day - wakes up with regurgitation almost daily.  Coughing at night.  Feels congested after regurgitation \par \par Since last year his heartburn has been getting worse.  Only takes Pepcid daily - not helping. No difficulty swallowing, no painful swallowing. \par \par Irregular stools - constipation then diarrhea.  \par In the past he noticed dark stools - he says "dark forest green" - no hematochezic, and no melena. \par \par Gets shot of breath from COPD - no fevers, chills.  \par \par \par \par \par \par \par \par Initial office visit in Oct 2017:  \par Pt reports having heartburn for years requiring Nexium. Lately he has been on Nexium twice a day with worsening heartburn. He says in the middle of the night he wakes up with chest pain that feels like a knife, radiating to back- lasting for a few min, or longer. When this happens he has to stretch his back, catch his breath, drink water. He says during the day he will also get similar chest pains - during the day he says he is constantly moving, walking because of his job. He says he gets SOB on exertion - SOB has is getting worse recently. Stress test on Aug 22, 2017 showed medium sized mild to moderate defect in the basal to mild inferoseptal and basal inferior wall that is mostly fixed suggestive of infarct with minimal héctor-infarct ischemia. He has an appointment with his cardiologist. \par \par In 2014 patient reports he had chest discomfort and presented to his local ER where imaging revealed aorta dilatation (ascending aorta measured up to 4.1 cm). Patient followed up with cardiologist with repeat imaging. Most recent CT scan measured aortic root at 5.1 cm. He is seen by cardiothoracic surgeon and was "recommend to continue to monitor and operate when necessary as surgery would be complex from a surgical standpoint. Continue to monitor on a regular basis and if there are signs to intervene we would then operate. Imaging in six months (November/December)."\par \par He has a Hx of colon polyps in the past - last colonoscopy was 6 or 7 years ago - he was told to have another colonoscopy in 2 years but did not follow through. \par \par He was recently seen by a gastroenterologist and scheduled for an upper endoscopy and colonoscopy but he has not had it yet because he needed clearance.\par \par He says two of his uncles on both side of the family have had pancreatic cancer (youngest age was 42). His paternal Aunt has had pancreatic cancer. His father has had colon polyps. \par  \par \par \par Discussion/Summary Oct 2017: \par Patient continues to have worsening heartburn - he says he has tried everything under the sun including Pepcid, Ranitidine, Nexium, omeprazole, PPI twice a day and nothing helps. Dexilant samples that were given in office has helped and he would like to try this. \par \par  \par \par  \par Dec 2019 visit: \par The patient had a CT scan of the abdomen and pelvis with IV contrast on October 21, 2019. The pancreas appeared normal. He likely have a benign adenoma in the right adrenal gland measuring 2.1 cm. A small hiatal hernia is present.\par \par Patient reports that he continues to take Dexilant 60 mg once a day since 2017. He also takes Tagamet along with Carafate. He says despite these medications, once a week, he has heartburn and will have reflux of "acid" in his mouth. He also reports having a "twisty" type of pain in his epigastric area. The twisty pain occurs at the time of heartburn.\par \par In 2017, he required stents in his heart and was on Plavix. He's currently only on aspirin 81 mg once a day.\par \par In 2018 he underwent upper endoscopy and colonoscopy. He had colon polyps removed. Told that he has a hiatal hernia. Was told told to have a repeat colonoscopy in 2 years - but not sure. I don't have records of his endoscopy and colonoscopy and he will get those records for our review.\par \par \par Discussion/Summary Feb 2020: \par Called patient and reviewed prior EGD/colonoscopy in 2018 - multiple colon polyps (tubular and hyperplastic) - he is due for colonoscopy this year. \par \par Would like to do EGD since IM in antrum in 2018. \par \par Risks of procedures such as perforation requiring surgery, bleeding, infection, diverticulitis, colitis, missed colon cancer (2% to 6%), internal organ injury, etc, risks of bowel prep including colitis, syncope, adverse reaction to medication etc. and risks of anesthesia including cardiopulmonary compromise were discussed with patient. Patient verbalized understanding and agrees to proceed with the planned procedure.\par \par Would like procedures on separate days. \par \par He will need cardiology clearance. \par  \par \par July 2020 colonoscopy: two cecal polyps removed, one ascending colon polyp removed, three transverse colon polyp removed, three descending colon polyp removed, twenty one sigmoid colon polyps removed. Other polyps left in left colon. \par \par Cecal polyps were sessile serrated adenomas. Ascending colon polyp was a tubular adenoma, transverse colon polyps were tubular adenomas, descending colon polyps were tubular adenomas, sigmoid colon polyps were tubulovillous adenoma and hyperplastic polyps. \par \par \par Discussion/Summary July 2020: \par Called and reviewed recent colonoscopy results - advised genetic testing since many colon polyps. Contact information given. \par \par \par Colonoscopy in Oct 2020: \par Multiple colon polyps removed ~31 polyps all less than 1 cm. Multiple other colon polyps were not removed in left colon. One cecal polyp removed, one ascending colon polyps removed, one transverse colon polyp removed, two descending colon polyp removed, 21 sigmoid colon polyps removed. \par \par Cecal polyp was tubular adenoma. Transverse colon polyp was a tubular adenoma. Descending colon polyp was an adenoma. Sigmoid colon polyps were tubular adenomas and hyperplastic polyps. Rectal polyps were hyperplastic polyps. \par  \par \par Oct 2020 office visit: \par Feeling well. Once in a while epigastric pain - says occurs frequently - Sometimes the pain is more than 5/10 on pain scale - when sitting it can hurt more - when laying down it feels better. It can last for several hours - no nausea, no vomiting. Pain has been present since July of this year. Pain stable. Feels it "slightly now." \par \par \par \par Discussion/Summary Oct 2020: \par Spoke with Daughter Sanam - discussed recent visit. She verbalized understanding. She says paternal aunt (patients sister) had colon cancer diagnosed in her 40s. Recommended her and her sibling to undergo a colonoscopy given patient's findings. \par \par After discussion with patient at time of last colonoscopy (April 2021) - he denied Sanam being his daughter -but a close co-worker - and would deny his sister having colon cancer. \par \par \par Discussion/Summary Nov 2020: \par Results of recent CT scan reviewed with patient over the phone - follow up endocrinologist for adrenal nodules. \par \par \par Discussion/Summary Feb 2021: \par Called patient he would like Dexilant 60 mg - long term side effects discussed - he will like to hold off - will try over the counter Pepcid and life style changes. \par \par \par Discussion/Summary March 2021: \par Called patient - he says he has been having more heartburn - has been taking Pepcid once a day - not helping. Will send a script for omeprazole 40 mg once a day. Follow up in 4 weeks,. \par \par \par Colonoscopy in April 2021:  Patient vomited shortly into the procedure - procedure aborted for patient's safety.

## 2021-05-11 NOTE — PHYSICAL EXAM
[General Appearance - In No Acute Distress] : in no acute distress [General Appearance - Alert] : alert [Sclera] : the sclera and conjunctiva were normal [Extraocular Movements] : extraocular movements were intact [Outer Ear] : the ears and nose were normal in appearance [Hearing Threshold Finger Rub Not Hendricks] : hearing was normal [Both Tympanic Membranes Were Examined] : both tympanic membranes were normal [Neck Appearance] : the appearance of the neck was normal [Neck Cervical Mass (___cm)] : no neck mass was observed [Auscultation Breath Sounds / Voice Sounds] : lungs were clear to auscultation bilaterally [Heart Rate And Rhythm] : heart rate was normal and rhythm regular [Heart Sounds] : normal S1 and S2 [Heart Sounds Gallop] : no gallops [Murmurs] : no murmurs [Heart Sounds Pericardial Friction Rub] : no pericardial rub [Bowel Sounds] : normal bowel sounds [Abdomen Soft] : soft [Abdomen Mass (___ Cm)] : no abdominal mass palpated [Cervical Lymph Nodes Enlarged Posterior Bilaterally] : posterior cervical [Cervical Lymph Nodes Enlarged Anterior Bilaterally] : anterior cervical [Supraclavicular Lymph Nodes Enlarged Bilaterally] : supraclavicular [Abnormal Walk] : normal gait [Nail Clubbing] : no clubbing  or cyanosis of the fingernails [] : no rash [Skin Color & Pigmentation] : normal skin color and pigmentation [Oriented To Time, Place, And Person] : oriented to person, place, and time [Impaired Insight] : insight and judgment were intact [Affect] : the affect was normal [FreeTextEntry1] : Mild tenderness on deep palpation in upper abdomen without rebound

## 2021-05-11 NOTE — ASSESSMENT
[FreeTextEntry1] : IMPRESSION: \par # Serrated polyposis syndrome - numerous serrated polyps in the colon\par -  Colonoscopy in 7/2020:  30 colon polyps all less than 1 cm, were removed by cold snare - polyps ranged from sessile serrated adenomas, tubular adenoma, tubulovillous adenoma and hyperplastic polyps.\par  \par -  Colonoscopy in 10/2020: ~31 colon polyps all less than 1 cm were removed.  Multiple other colon polyps were not removed in left colon. Removed polyps ranged from tubular adenomas and hyperplastic polyps.  Multiple other colon polyps were left behind primarily in left colon.  \par \par -  Colonoscopy in April 2021: Patient vomited shortly into the procedure - procedure aborted for patient's safety.  Patient then admitted for aspiration pneumonia\par \par # Family history of digestive cancers \par -  One paternal aunt and uncle with pancreatic cancer - one maternal uncle with pancreatic cancer - youngest age was 42\par -  Father had colon polyps \par \par # History of Epigastric pain/GERD \par -  EGD in June 2020:  3 cm hiatal hernia - mild gastric erythema s/p bx.  Bx negative for HP, IM, BE, and celiac disease.  \par -  CT scan of abdomen on Nov 2020 with IV contrast:  small hiatal hernia, b/l indeterminate adrenal nodules - likely adenomas.  no acute pathology \par -  Outside MRI - view of pancreas limited as per patient - requested records. \par \par # Multiple comorbidities: CAD s/p stent, CHF, COPD, HTN, Hyperlipidemia and Aortic aneurysm \par \par \par DISCUSSION/PLAN:\par Patients with Serrated Polyposis Syndrome may have an increased risk of colorectal cancer.\par \par Management of patients with Serrated Polyposis Syndrome – We discussed management strategies for patients with Serrated Polyposis Syndrome and their families have not been well defined. Polyps should be resected completely. Subsequent colonoscopy intervals are based on the number and size of polyps, as well as the number of concurrent adenomas. Risks of colonoscopies, including risk of colon cancer despite surveillance colonoscopies was discussed. Family members should begin screening for colon cancer at age 40. This was all discussed with patient on previous office visit and reviewed again. \par \par We also discussed indications for colectomy in patients with Serrated polyposis syndrome. They include the following: Documented or suspected Colon cancer. Polyps with high-grade dysplasia or multiple adenomas larger than 6 mm. Marked increases in polyp number on consecutive exams. Inability to adequately survey the colon because of multiple diminutive polyps. \par \par Since he continues to have numerous colon polyps in colon - I have recommended him see a colorectal surgeon - contact information give.  I also gave him the option of seeking a second opinion - contact information given.  \par  \par Although genetic testing is not needed for Serrated Polyposis Syndrome, I have still recommended to see a Genetic counselor since three members in family with pancreatic cancer. Results of Genetic testing will affect his two children. He is agreeable to see a genetic counselor - contact information given again. \par \par With regards to worsening reflux, advised PPI once a day - Omeprazole 40 mg once a day - if after two three weeks it is not helping- he will call me.\par \par Since he says pancreas not fully on recent MRI - will order a pancreatic MRI given family Hx. \par \par If MRI is normal of the pancreas, we may consider endoscopic ultrasound to assess pancreas if ongoing upper abdominal pain.\par \par All questions posed by the patient were answered.  He will follow up with me after his surgical consultation.  \par

## 2021-06-03 DIAGNOSIS — R10.13 EPIGASTRIC PAIN: ICD-10-CM

## 2021-06-14 ENCOUNTER — RX RENEWAL (OUTPATIENT)
Age: 66
End: 2021-06-14

## 2021-06-16 ENCOUNTER — NON-APPOINTMENT (OUTPATIENT)
Age: 66
End: 2021-06-16

## 2021-07-09 ENCOUNTER — APPOINTMENT (OUTPATIENT)
Dept: PEDIATRIC MEDICAL GENETICS | Facility: CLINIC | Age: 66
End: 2021-07-09
Payer: MEDICARE

## 2021-07-09 ENCOUNTER — APPOINTMENT (OUTPATIENT)
Dept: COLORECTAL SURGERY | Facility: CLINIC | Age: 66
End: 2021-07-09
Payer: MEDICARE

## 2021-07-09 VITALS
WEIGHT: 195 LBS | SYSTOLIC BLOOD PRESSURE: 120 MMHG | HEART RATE: 62 BPM | RESPIRATION RATE: 14 BRPM | TEMPERATURE: 97.8 F | BODY MASS INDEX: 27.3 KG/M2 | HEIGHT: 71 IN | DIASTOLIC BLOOD PRESSURE: 70 MMHG

## 2021-07-09 DIAGNOSIS — Z83.71 FAMILY HISTORY OF COLONIC POLYPS: ICD-10-CM

## 2021-07-09 DIAGNOSIS — D12.6 BENIGN NEOPLASM OF COLON, UNSPECIFIED: ICD-10-CM

## 2021-07-09 PROCEDURE — 99205 OFFICE O/P NEW HI 60 MIN: CPT

## 2021-07-09 PROCEDURE — 99203 OFFICE O/P NEW LOW 30 MIN: CPT

## 2021-07-09 RX ORDER — FAMOTIDINE 40 MG/1
40 TABLET, FILM COATED ORAL DAILY
Refills: 0 | Status: DISCONTINUED | COMMUNITY
Start: 2021-04-28 | End: 2021-07-09

## 2021-07-11 NOTE — PHYSICAL EXAM
[Normal Breath Sounds] : Normal breath sounds [Normal Heart Sounds] : normal heart sounds [Normal Rate and Rhythm] : normal rate and rhythm [No Edema] : No edema [Alert] : alert [Oriented to Person] : oriented to person [Oriented to Place] : oriented to place [Oriented to Time] : oriented to time [Calm] : calm [de-identified] : protruberant +BS NT/ND healed upper midline scar [de-identified] : NC/AT [de-identified] : +ROM [de-identified] : intact

## 2021-07-11 NOTE — ASSESSMENT
[FreeTextEntry1] : Very pleasant 65-year-old gentleman presents today for consultation regarding multiple colonic polyps. \par \par Patient had a colonoscopy in years past which revealed some polyps. He had a colonoscopy in July 2020 and then an additional colonoscopy in October of 2020. In toto, over 60 polyps were removed. Biopsies revealed hyperplastic polyps, adenomatous polyps and serrated adenomas. An attempt at a repeat colonoscopy was performed in April 2021. The procedure was aborted due to aspiration. The proctoscopic and sigmoidoscopic examination at that time revealed multiple sigmoid polyps.\par \par Patient is known to have an ascending aortic aneurysm which has been followed with serial CAT scans and is stable. The patient has a cardiac stent in place that was inserted 3 years ago. He has no symptoms referable to his heart. He does follow up with his cardiologist. Patient has a positive family history of pancreatic cancer and is undergoing some genetic testing. He does have hypertension and COPD. He is prediabetic.\par \par His past abdominal surgical history includes a laparoscopic cholecystectomy, repair of an epigastric hernia and repair of bilateral inguinal hernias. He has mesh in place in his left inguinal hernia repair site.\par \par The situation is a difficult one since the patient is certainly a very high risk for colon cancer with the number of polyps his colon harbors. To eliminate all risk of colon cancer he would require a pan proctocolectomy and either a permanent ileostomy or pelvic pouch. He does not desire a permanent ostomy and I do not believe he is a good candidate for pelvic pouch in terms of bowel function and quality of life. The other option would be a total abdominal colectomy with an ileorectal anastomosis. I explained to the patient that often these individuals will experience approximately 6 bowel movements daily. He would opt for this procedure rather than a permanent ostomy. I already discussed options with his gastroenterologist.\par \par We talked about a laparoscopic-assisted approach including anticipated operative time, hospital stay and time to complete recuperation. Risks, alternatives and benefits including but not limited to an anastomotic leak, wound infection and deep venous thrombosis were discussed. Questions were answered and he understands the need for surgery. He will check his work schedule and get back to me as to when he wishes to proceed.

## 2021-07-11 NOTE — REVIEW OF SYSTEMS
[SOB on Exertion] : shortness of breath during exertion [As Noted in HPI] : as noted in HPI [Easy Bruising] : a tendency for easy bruising [Negative] : Psychiatric [Easy Bleeding] : no tendency for easy bleeding [FreeTextEntry5] : 2021 full cardiac evaluation. Denies intervention. Recent CT secondary to hx aortic aneurysm. [FreeTextEntry9] : hand discomfort [de-identified] : prediabetic

## 2021-07-11 NOTE — HISTORY OF PRESENT ILLNESS
[FreeTextEntry1] : 64yo WM UNDERWENT 2 colonoscopies age 50s with polypectomies performed.  July 2020 and Oct 2020 colonoscopies performed with multiple polyps noted/retrieved (last colonoscopy had been @6-7yrs prior). April 2021 colonoscopy initiated but aborted as pt aspirated (has severe GERD).\par \par Pt with loose stool over past year. Denies blood in stool. Appetite/weight stable.\par \par Scheduled to see [facility] genetic counselor today. +FH pancreatic Ca (paternal/maternal).

## 2021-08-12 ENCOUNTER — NON-APPOINTMENT (OUTPATIENT)
Age: 66
End: 2021-08-12

## 2021-08-18 ENCOUNTER — NON-APPOINTMENT (OUTPATIENT)
Age: 66
End: 2021-08-18

## 2021-08-19 ENCOUNTER — NON-APPOINTMENT (OUTPATIENT)
Age: 66
End: 2021-08-19

## 2021-09-06 NOTE — END OF VISIT
Last Rx refill-----09/03/19  Last office visit--04/23/19  Next office visit--01/07/20        ----- Message from Jeff Rodriguez sent at 10/2/2019  9:33 AM CDT -----  Contact: Pt. [phone]  Rx Refill/Request     Refill Rx:      Rx Name and Strength:  Oxycodone  MG    Preferred Pharmacy with phone number:     [id number] - [place] - [facility] [facility] [address]  [place] [address]  Phone: [phone] Fax: [phone]      Communication Preference:PHONE     Additional Information:          [Time Spent: ___ minutes] : I have spent [unfilled] minutes of time on the encounter.

## 2021-09-06 NOTE — CONSULT LETTER
[Dear  ___] : Dear  [unfilled], [Consult Letter:] : I had the pleasure of evaluating your patient, [unfilled]. [Please see my note below.] : Please see my note below. [Sincerely,] : Sincerely, [FreeTextEntry3] : Noe Cardoza MD\par

## 2021-09-22 ENCOUNTER — APPOINTMENT (OUTPATIENT)
Dept: UROLOGY | Facility: CLINIC | Age: 66
End: 2021-09-22

## 2021-10-04 ENCOUNTER — NON-APPOINTMENT (OUTPATIENT)
Age: 66
End: 2021-10-04

## 2021-10-07 ENCOUNTER — OUTPATIENT (OUTPATIENT)
Dept: OUTPATIENT SERVICES | Facility: HOSPITAL | Age: 66
LOS: 1 days | End: 2021-10-07
Payer: MEDICARE

## 2021-10-07 VITALS
DIASTOLIC BLOOD PRESSURE: 79 MMHG | HEART RATE: 60 BPM | TEMPERATURE: 98 F | WEIGHT: 186.07 LBS | RESPIRATION RATE: 20 BRPM | SYSTOLIC BLOOD PRESSURE: 122 MMHG | OXYGEN SATURATION: 96 % | HEIGHT: 71 IN

## 2021-10-07 DIAGNOSIS — I25.10 ATHEROSCLEROTIC HEART DISEASE OF NATIVE CORONARY ARTERY WITHOUT ANGINA PECTORIS: ICD-10-CM

## 2021-10-07 DIAGNOSIS — K63.5 POLYP OF COLON: ICD-10-CM

## 2021-10-07 DIAGNOSIS — Z87.09 PERSONAL HISTORY OF OTHER DISEASES OF THE RESPIRATORY SYSTEM: ICD-10-CM

## 2021-10-07 DIAGNOSIS — Z29.9 ENCOUNTER FOR PROPHYLACTIC MEASURES, UNSPECIFIED: ICD-10-CM

## 2021-10-07 DIAGNOSIS — K21.9 GASTRO-ESOPHAGEAL REFLUX DISEASE WITHOUT ESOPHAGITIS: ICD-10-CM

## 2021-10-07 DIAGNOSIS — I71.2 THORACIC AORTIC ANEURYSM, WITHOUT RUPTURE: ICD-10-CM

## 2021-10-07 DIAGNOSIS — Z98.890 OTHER SPECIFIED POSTPROCEDURAL STATES: Chronic | ICD-10-CM

## 2021-10-07 DIAGNOSIS — Z01.818 ENCOUNTER FOR OTHER PREPROCEDURAL EXAMINATION: ICD-10-CM

## 2021-10-07 LAB
A1C WITH ESTIMATED AVERAGE GLUCOSE RESULT: 5.9 % — HIGH (ref 4–5.6)
ALBUMIN SERPL ELPH-MCNC: 4.3 G/DL — SIGNIFICANT CHANGE UP (ref 3.3–5)
ALP SERPL-CCNC: 89 U/L — SIGNIFICANT CHANGE UP (ref 40–120)
ALT FLD-CCNC: 23 U/L — SIGNIFICANT CHANGE UP (ref 10–45)
ANION GAP SERPL CALC-SCNC: 11 MMOL/L — SIGNIFICANT CHANGE UP (ref 5–17)
AST SERPL-CCNC: 18 U/L — SIGNIFICANT CHANGE UP (ref 10–40)
BILIRUB SERPL-MCNC: 0.8 MG/DL — SIGNIFICANT CHANGE UP (ref 0.2–1.2)
BLD GP AB SCN SERPL QL: NEGATIVE — SIGNIFICANT CHANGE UP
BUN SERPL-MCNC: 16 MG/DL — SIGNIFICANT CHANGE UP (ref 7–23)
CALCIUM SERPL-MCNC: 9.4 MG/DL — SIGNIFICANT CHANGE UP (ref 8.4–10.5)
CEA SERPL-MCNC: 3.1 NG/ML — SIGNIFICANT CHANGE UP (ref 0–3.8)
CHLORIDE SERPL-SCNC: 104 MMOL/L — SIGNIFICANT CHANGE UP (ref 96–108)
CO2 SERPL-SCNC: 24 MMOL/L — SIGNIFICANT CHANGE UP (ref 22–31)
CREAT SERPL-MCNC: 1.11 MG/DL — SIGNIFICANT CHANGE UP (ref 0.5–1.3)
ESTIMATED AVERAGE GLUCOSE: 123 MG/DL — HIGH (ref 68–114)
GLUCOSE SERPL-MCNC: 127 MG/DL — HIGH (ref 70–99)
HCT VFR BLD CALC: 48 % — SIGNIFICANT CHANGE UP (ref 39–50)
HGB BLD-MCNC: 15.6 G/DL — SIGNIFICANT CHANGE UP (ref 13–17)
MCHC RBC-ENTMCNC: 28.9 PG — SIGNIFICANT CHANGE UP (ref 27–34)
MCHC RBC-ENTMCNC: 32.5 GM/DL — SIGNIFICANT CHANGE UP (ref 32–36)
MCV RBC AUTO: 89.1 FL — SIGNIFICANT CHANGE UP (ref 80–100)
NRBC # BLD: 0 /100 WBCS — SIGNIFICANT CHANGE UP (ref 0–0)
PLATELET # BLD AUTO: 223 K/UL — SIGNIFICANT CHANGE UP (ref 150–400)
POTASSIUM SERPL-MCNC: 4.2 MMOL/L — SIGNIFICANT CHANGE UP (ref 3.5–5.3)
POTASSIUM SERPL-SCNC: 4.2 MMOL/L — SIGNIFICANT CHANGE UP (ref 3.5–5.3)
PROT SERPL-MCNC: 6.9 G/DL — SIGNIFICANT CHANGE UP (ref 6–8.3)
RBC # BLD: 5.39 M/UL — SIGNIFICANT CHANGE UP (ref 4.2–5.8)
RBC # FLD: 13 % — SIGNIFICANT CHANGE UP (ref 10.3–14.5)
RH IG SCN BLD-IMP: POSITIVE — SIGNIFICANT CHANGE UP
SODIUM SERPL-SCNC: 139 MMOL/L — SIGNIFICANT CHANGE UP (ref 135–145)
WBC # BLD: 8.64 K/UL — SIGNIFICANT CHANGE UP (ref 3.8–10.5)
WBC # FLD AUTO: 8.64 K/UL — SIGNIFICANT CHANGE UP (ref 3.8–10.5)

## 2021-10-07 PROCEDURE — 86900 BLOOD TYPING SEROLOGIC ABO: CPT

## 2021-10-07 PROCEDURE — 86850 RBC ANTIBODY SCREEN: CPT

## 2021-10-07 PROCEDURE — 87086 URINE CULTURE/COLONY COUNT: CPT

## 2021-10-07 PROCEDURE — 85027 COMPLETE CBC AUTOMATED: CPT

## 2021-10-07 PROCEDURE — 83036 HEMOGLOBIN GLYCOSYLATED A1C: CPT

## 2021-10-07 PROCEDURE — 86901 BLOOD TYPING SEROLOGIC RH(D): CPT

## 2021-10-07 PROCEDURE — 80053 COMPREHEN METABOLIC PANEL: CPT

## 2021-10-07 PROCEDURE — G0463: CPT

## 2021-10-07 PROCEDURE — 82378 CARCINOEMBRYONIC ANTIGEN: CPT

## 2021-10-07 RX ORDER — OMEPRAZOLE 10 MG/1
1 CAPSULE, DELAYED RELEASE ORAL
Qty: 0 | Refills: 0 | DISCHARGE

## 2021-10-07 RX ORDER — L.ACIDOPH/B.ANIMALIS/B.LONGUM 15B CELL
1 CAPSULE ORAL
Qty: 0 | Refills: 0 | DISCHARGE

## 2021-10-07 RX ORDER — ATORVASTATIN CALCIUM 80 MG/1
1 TABLET, FILM COATED ORAL
Qty: 0 | Refills: 0 | DISCHARGE

## 2021-10-07 RX ORDER — FAMOTIDINE 10 MG/ML
1 INJECTION INTRAVENOUS
Qty: 0 | Refills: 0 | DISCHARGE

## 2021-10-07 RX ORDER — ALPRAZOLAM 0.25 MG
1 TABLET ORAL
Qty: 0 | Refills: 0 | DISCHARGE

## 2021-10-07 NOTE — H&P PST ADULT - PHONE #
Requested medication(s) are due for refill today: Yes  Patient has already received a courtesy refill: No  Other reason request has been forwarded to provider:   Patient has not had TSH done in over a year  [phone]

## 2021-10-07 NOTE — H&P PST ADULT - HISTORY OF PRESENT ILLNESS
Caller:  [facility]  RE:  Levobunolol  FAX:  [phone]  Fax placed in tech's basket   66 y 66 year old male presents for preop testing today for schedule ERP/ total abdominal colectomy with ileorectal anastomosis/ urology to add for polyp of colon on 10/21/2021. His medical history is significant for aspiration event after colonoscopy on 4/27/2021 at [facility]. Per note physician note, "patient vomited yellowish tinged fluid upon administration of propofol and insertion of colonoscopy." Patient was admitted and treated with antibiotic for couple days. last chest x-ray report         Mr Jeffries is a 64 yo M presented for elective colonoscopy and subsequently had an aspiration event. PMH Serrated polyposis syndrome, CAD, HTN, Aortic Aneurysm, GERD, COPD, Former Tobacco Dependence.   Patient seen and examined at bedside. He reports having a headache which is improving. He also felt some SOB right after vomiting, but has no current SOB. He has no other complaints.   Denies fevers, chills, light-headedness, dizziness, chest pain, palpitations, abdominal pain, constipation, diarrhea, melena, hematochezia, dysuria.   As per Anesthesia:   Upon administration of propofol and insertion of colonoscope, patient vomited yellowish tinged fluid from his oropharynx.  Oropharynx suctioned vigorously, patient was BMV for decreasing Sp02.  Procedure was aborted secondary to full stomach and possibility of continued vomiting.  Patient was taken to the holding area, vital signs are stable.   66 year old male presents for preop testing today for schedule ERP/ total abdominal colectomy with ileorectal anastomosis/ urology to add for polyp of colon on 10/21/2021. His medical history is significant for aspiration event after colonoscopy in April 2021 at [facility]. Per note physician note, "patient vomited yellowish tinged fluid upon administration of propofol and insertion of colonoscopy." Patient was admitted and treated with antibiotic for couple days. last chest x-ray report on file. Patient medical history also includes COPD (no exacerbation, uses Symbicort PRN), current some day smoker (mostly cigars twice a week), CAD s/p cardiac cath for unstable angina (10/19/2017) with coronary artery stenting x 1 (LAD), thoracic aneurysm (4.1) diagnosed x 4 years ago (no change in size per patient, yearly chest CT, HTN, dyslipidemia, GERD, renal cell carcinoma s/p right partial nephrectomy, stable lung nodule, fatty liver, anxiety disorders. Patient denies any abdominal pain, no N/V/D.  Patient is fully vaccinated, denies any s/s of covid-19 infection, and no known exposure. Patient is schedule for covid-19 PCR swab on 10/18/2021, but prefers to have test close to home and will make apt.         66 year old male presents for preop testing today for schedule ERP/ total abdominal colectomy with ileorectal anastomosis/ urology to add for polyp of colon on 10/21/2021. His medical history is significant for aspiration event after colonoscopy in April 2021 at [facility]. Per physician note, "patient vomited yellowish tinged fluid upon administration of propofol and insertion of colonoscopy." Patient was admitted and treated with antibiotic for couple days. last chest x-ray report on file. Patient medical history also includes COPD (no exacerbation, uses Symbicort PRN), current some day smoker (mostly cigars twice a week), CAD s/p cardiac cath for unstable angina (10/19/2017) with coronary artery stenting x 1 (LAD), thoracic aneurysm (4.1) diagnosed x 4 years ago (no change in size per patient, yearly chest CT, HTN, dyslipidemia, GERD, renal cell carcinoma s/p right partial nephrectomy, stable lung nodule, fatty liver, anxiety disorders. Patient denies any abdominal pain, no N/V/D.  Patient is fully vaccinated, denies any s/s of covid-19 infection, and no known exposure. Patient is schedule for covid-19 PCR swab on 10/18/2021, but prefers to have test close to home and will make apt.

## 2021-10-07 NOTE — H&P PST ADULT - ASSESSMENT
CAPRINI SCORE [CLOT updated 18]    AGE RELATED RISK FACTORS                                                       MOBILITY RELATED FACTORS  [ ] Age 41-60 years                                            (1 Point)                    [ ] Bed rest                                                        (1 Point)  [2 ] Age: 61-74 years                                           (2 Points)                  [ ] Plaster cast                                                   (2 Points)  [ ] Age= 75 years                                              (3 Points)                    [ ] Bed bound for more than 72 hours                 (2 Points)    DISEASE RELATED RISK FACTORS                                               GENDER SPECIFIC FACTORS  [ ] Edema in the lower extremities                       (1 Point)              [ ] Pregnancy                                                     (1 Point)  [ ] Varicose veins                                               (1 Point)                     [ ] Post-partum < 6 weeks                                   (1 Point)             [1 ] BMI > 25 Kg/m2                                            (1 Point)                     [ ] Hormonal therapy  or oral contraception          (1 Point)                 [ ] Sepsis (in the previous month)                        (1 Point)               [ ] History of pregnancy complications                 (1 point)  [ ] Pneumonia or serious lung disease                                               [ ] Unexplained or recurrent                     (1 Point)           (in the previous month)                               (1 Point)  [ ] Abnormal pulmonary function test                     (1 Point)                 SURGERY RELATED RISK FACTORS  [ ] Acute myocardial infarction                              (1 Point)               [ ]  Section                                             (1 Point)  [ ] Congestive heart failure (in the previous month)  (1 Point)      [ ] Minor surgery                                                  (1 Point)   [ ] Inflammatory bowel disease                             (1 Point)               [ ] Arthroscopic surgery                                        (2 Points)  [ ] Central venous access                                      (2 Points)                [2 ] General surgery lasting more than 45 minutes (2 points)  [2 ] Present or previous malignancy                     (2 Points)                [ ] Elective arthroplasty                                         (5 points)    [ ] Stroke (in the previous month)                          (5 Points)                                                                                                                                                           HEMATOLOGY RELATED FACTORS                                                 TRAUMA RELATED RISK FACTORS  [ ] Prior episodes of VTE                                     (3 Points)                [ ] Fracture of the hip, pelvis, or leg                       (5 Points)  [ ] Positive family history for VTE                         (3 Points)             [ ] Acute spinal cord injury (in the previous month)  (5 Points)  [ ] Prothrombin 85554 A                                     (3 Points)               [ ] Paralysis  (less than 1 month)                             (5 Points)  [ ] Factor V Leiden                                             (3 Points)                  [ ] Multiple Trauma within 1 month                        (5 Points)  [ ] Lupus anticoagulants                                     (3 Points)                                                           [ ] Anticardiolipin antibodies                               (3 Points)                                                       [ ] High homocysteine in the blood                      (3 Points)                                             [ ] Other congenital or acquired thrombophilia      (3 Points)                                                [ ] Heparin induced thrombocytopenia                  (3 Points)                                     Total Score [7]

## 2021-10-07 NOTE — H&P PST ADULT - NS MD HP INPLANTS MED DEV
coronary artery stent, left inguinal mesh coronary artery stent, Loop record in place x10 years, left inguinal mesh

## 2021-10-07 NOTE — H&P PST ADULT - NSICDXPASTMEDICALHX_GEN_ALL_CORE_FT
PAST MEDICAL HISTORY:  CAD (coronary artery disease)     COPD without exacerbation     Current smoker 1 cigar twice a week   smoking started in 20's    GERD (gastroesophageal reflux disease)     H/O unstable angina prompted cardiac cath in 2017  stent x 1 LAD    History of cholecystectomy     HTN (hypertension)     Liver nodule     Lung nodule     Thoracic aortic aneurysm diagnosed 4 years ago (4.2 no change per patient, last CT 2021- Dr. Cope)  states was evaluated by Dr. Lancaster and advised no need for surgery, just monitoring.     PAST MEDICAL HISTORY:  CAD (coronary artery disease) coronary artery stenting 2017 (LAD x1 stent)    COPD without exacerbation uses symbicort PRN    Current smoker 1 cigar twice a week   smoking started in 20's    GERD (gastroesophageal reflux disease)     H/O unstable angina prompted cardiac cath in 2017  stent x 1 LAD    History of cholecystectomy     HTN (hypertension)     Liver nodule     Lung nodule     Thoracic aortic aneurysm diagnosed 4 years ago (4.2 no change per patient, last CT 2021- Dr. Cope)  states was evaluated by Dr. Lancaster and advised no need for surgery, just monitoring.     PAST MEDICAL HISTORY:  Aspiration pneumonia April 2021    CAD (coronary artery disease) coronary artery stenting 2017 (LAD x1 stent)    COPD without exacerbation uses symbicort PRN    Current smoker 1 cigar twice a week   smoking started in 20's    Fatty liver     GERD (gastroesophageal reflux disease)     H/O unstable angina prompted cardiac cath in 2017  stent x 1 LAD    History of cholecystectomy     HTN (hypertension)     Liver nodule     Lung nodule     Thoracic aortic aneurysm diagnosed 4 years ago (4.2 no change per patient, last CT 2021- Dr. Cope)  states was evaluated by Dr. Lancaster and advised no need for surgery, just monitoring.

## 2021-10-07 NOTE — H&P PST ADULT - PROBLEM SELECTOR PLAN 1
scheduled for ERP/ total abdominal colectomy with ileorectal anastomosis/ urology to add  preop instruction discussed and patient verbalized understanding   fs on admit   chlorhexidine wash instruction given

## 2021-10-07 NOTE — H&P PST ADULT - ANESTHESIA, PREVIOUS REACTION, PROFILE
with last colonoscopy vomited aspiration pneumonia in 4/2021 after administration of propofol and colonoscopy, vomited yellowish tinge emesis

## 2021-10-08 LAB
CULTURE RESULTS: NO GROWTH — SIGNIFICANT CHANGE UP
SPECIMEN SOURCE: SIGNIFICANT CHANGE UP

## 2021-10-11 PROBLEM — Z86.79 PERSONAL HISTORY OF OTHER DISEASES OF THE CIRCULATORY SYSTEM: Chronic | Status: ACTIVE | Noted: 2021-10-07

## 2021-10-11 PROBLEM — I25.10 ATHEROSCLEROTIC HEART DISEASE OF NATIVE CORONARY ARTERY WITHOUT ANGINA PECTORIS: Chronic | Status: ACTIVE | Noted: 2021-04-20

## 2021-10-11 PROBLEM — J44.9 CHRONIC OBSTRUCTIVE PULMONARY DISEASE, UNSPECIFIED: Chronic | Status: ACTIVE | Noted: 2021-04-20

## 2021-10-11 PROBLEM — I71.2 THORACIC AORTIC ANEURYSM, WITHOUT RUPTURE: Chronic | Status: ACTIVE | Noted: 2021-10-07

## 2021-10-11 PROBLEM — J69.0 PNEUMONITIS DUE TO INHALATION OF FOOD AND VOMIT: Chronic | Status: ACTIVE | Noted: 2021-10-07

## 2021-10-11 PROBLEM — R91.1 SOLITARY PULMONARY NODULE: Chronic | Status: ACTIVE | Noted: 2021-10-07

## 2021-10-11 PROBLEM — Z90.49 ACQUIRED ABSENCE OF OTHER SPECIFIED PARTS OF DIGESTIVE TRACT: Chronic | Status: ACTIVE | Noted: 2021-10-07

## 2021-10-11 PROBLEM — K21.9 GASTRO-ESOPHAGEAL REFLUX DISEASE WITHOUT ESOPHAGITIS: Chronic | Status: ACTIVE | Noted: 2021-10-07

## 2021-10-11 PROBLEM — K76.89 OTHER SPECIFIED DISEASES OF LIVER: Chronic | Status: ACTIVE | Noted: 2021-10-07

## 2021-10-11 PROBLEM — F17.200 NICOTINE DEPENDENCE, UNSPECIFIED, UNCOMPLICATED: Chronic | Status: ACTIVE | Noted: 2021-10-07

## 2021-10-11 PROBLEM — K76.0 FATTY (CHANGE OF) LIVER, NOT ELSEWHERE CLASSIFIED: Chronic | Status: ACTIVE | Noted: 2021-10-07

## 2021-10-18 ENCOUNTER — OUTPATIENT (OUTPATIENT)
Dept: OUTPATIENT SERVICES | Facility: HOSPITAL | Age: 66
LOS: 1 days | End: 2021-10-18

## 2021-10-18 ENCOUNTER — APPOINTMENT (OUTPATIENT)
Dept: DISASTER EMERGENCY | Facility: CLINIC | Age: 66
End: 2021-10-18

## 2021-10-18 ENCOUNTER — LABORATORY RESULT (OUTPATIENT)
Age: 66
End: 2021-10-18

## 2021-10-18 DIAGNOSIS — Z01.818 ENCOUNTER FOR OTHER PREPROCEDURAL EXAMINATION: ICD-10-CM

## 2021-10-18 DIAGNOSIS — Z98.890 OTHER SPECIFIED POSTPROCEDURAL STATES: Chronic | ICD-10-CM

## 2021-10-18 DIAGNOSIS — Z11.52 ENCOUNTER FOR SCREENING FOR COVID-19: ICD-10-CM

## 2021-10-19 PROBLEM — Z01.818 PRE-OP TESTING: Status: ACTIVE | Noted: 2020-06-15

## 2021-10-20 ENCOUNTER — TRANSCRIPTION ENCOUNTER (OUTPATIENT)
Age: 66
End: 2021-10-20

## 2021-10-21 ENCOUNTER — RESULT REVIEW (OUTPATIENT)
Age: 66
End: 2021-10-21

## 2021-10-21 ENCOUNTER — APPOINTMENT (OUTPATIENT)
Dept: UROLOGY | Facility: HOSPITAL | Age: 66
End: 2021-10-21

## 2021-10-21 ENCOUNTER — INPATIENT (INPATIENT)
Facility: HOSPITAL | Age: 66
LOS: 4 days | Discharge: LEFT BEFORE DISCHARGE | DRG: 330 | End: 2021-10-26
Attending: SURGERY | Admitting: SURGERY
Payer: MEDICARE

## 2021-10-21 ENCOUNTER — APPOINTMENT (OUTPATIENT)
Dept: COLORECTAL SURGERY | Facility: HOSPITAL | Age: 66
End: 2021-10-21
Payer: MEDICARE

## 2021-10-21 VITALS
SYSTOLIC BLOOD PRESSURE: 121 MMHG | HEART RATE: 76 BPM | HEIGHT: 70.87 IN | TEMPERATURE: 98 F | DIASTOLIC BLOOD PRESSURE: 68 MMHG | WEIGHT: 186.07 LBS | RESPIRATION RATE: 18 BRPM | OXYGEN SATURATION: 96 %

## 2021-10-21 DIAGNOSIS — Z98.890 OTHER SPECIFIED POSTPROCEDURAL STATES: Chronic | ICD-10-CM

## 2021-10-21 DIAGNOSIS — K63.5 POLYP OF COLON: ICD-10-CM

## 2021-10-21 LAB
ANION GAP SERPL CALC-SCNC: 18 MMOL/L — HIGH (ref 5–17)
BUN SERPL-MCNC: 17 MG/DL — SIGNIFICANT CHANGE UP (ref 7–23)
CALCIUM SERPL-MCNC: 8 MG/DL — LOW (ref 8.4–10.5)
CHLORIDE SERPL-SCNC: 104 MMOL/L — SIGNIFICANT CHANGE UP (ref 96–108)
CO2 SERPL-SCNC: 19 MMOL/L — LOW (ref 22–31)
CREAT SERPL-MCNC: 1.37 MG/DL — HIGH (ref 0.5–1.3)
GLUCOSE BLDC GLUCOMTR-MCNC: 119 MG/DL — HIGH (ref 70–99)
GLUCOSE SERPL-MCNC: 148 MG/DL — HIGH (ref 70–99)
HCT VFR BLD CALC: 46.2 % — SIGNIFICANT CHANGE UP (ref 39–50)
HGB BLD-MCNC: 15.2 G/DL — SIGNIFICANT CHANGE UP (ref 13–17)
MAGNESIUM SERPL-MCNC: 2 MG/DL — SIGNIFICANT CHANGE UP (ref 1.6–2.6)
MCHC RBC-ENTMCNC: 29.3 PG — SIGNIFICANT CHANGE UP (ref 27–34)
MCHC RBC-ENTMCNC: 32.9 GM/DL — SIGNIFICANT CHANGE UP (ref 32–36)
MCV RBC AUTO: 89 FL — SIGNIFICANT CHANGE UP (ref 80–100)
NRBC # BLD: 0 /100 WBCS — SIGNIFICANT CHANGE UP (ref 0–0)
PHOSPHATE SERPL-MCNC: 3.9 MG/DL — SIGNIFICANT CHANGE UP (ref 2.5–4.5)
PLATELET # BLD AUTO: 217 K/UL — SIGNIFICANT CHANGE UP (ref 150–400)
POTASSIUM SERPL-MCNC: 3.9 MMOL/L — SIGNIFICANT CHANGE UP (ref 3.5–5.3)
POTASSIUM SERPL-SCNC: 3.9 MMOL/L — SIGNIFICANT CHANGE UP (ref 3.5–5.3)
RBC # BLD: 5.19 M/UL — SIGNIFICANT CHANGE UP (ref 4.2–5.8)
RBC # FLD: 12.9 % — SIGNIFICANT CHANGE UP (ref 10.3–14.5)
RH IG SCN BLD-IMP: POSITIVE — SIGNIFICANT CHANGE UP
SODIUM SERPL-SCNC: 141 MMOL/L — SIGNIFICANT CHANGE UP (ref 135–145)
WBC # BLD: 12.57 K/UL — HIGH (ref 3.8–10.5)
WBC # FLD AUTO: 12.57 K/UL — HIGH (ref 3.8–10.5)

## 2021-10-21 PROCEDURE — 44210 LAPARO TOTAL PROCTOCOLECTOMY: CPT

## 2021-10-21 PROCEDURE — 52005 CYSTO W/URTRL CATHJ: CPT

## 2021-10-21 PROCEDURE — 88309 TISSUE EXAM BY PATHOLOGIST: CPT | Mod: 26

## 2021-10-21 RX ORDER — OXYCODONE HYDROCHLORIDE 5 MG/1
5 TABLET ORAL EVERY 4 HOURS
Refills: 0 | Status: DISCONTINUED | OUTPATIENT
Start: 2021-10-21 | End: 2021-10-24

## 2021-10-21 RX ORDER — NALOXONE HYDROCHLORIDE 4 MG/.1ML
0.1 SPRAY NASAL
Refills: 0 | Status: DISCONTINUED | OUTPATIENT
Start: 2021-10-21 | End: 2021-10-22

## 2021-10-21 RX ORDER — ASPIRIN/CALCIUM CARB/MAGNESIUM 324 MG
81 TABLET ORAL DAILY
Refills: 0 | Status: DISCONTINUED | OUTPATIENT
Start: 2021-10-22 | End: 2021-10-26

## 2021-10-21 RX ORDER — BUDESONIDE AND FORMOTEROL FUMARATE DIHYDRATE 160; 4.5 UG/1; UG/1
2 AEROSOL RESPIRATORY (INHALATION)
Refills: 0 | Status: DISCONTINUED | OUTPATIENT
Start: 2021-10-21 | End: 2021-10-26

## 2021-10-21 RX ORDER — GABAPENTIN 400 MG/1
600 CAPSULE ORAL ONCE
Refills: 0 | Status: COMPLETED | OUTPATIENT
Start: 2021-10-21 | End: 2021-10-21

## 2021-10-21 RX ORDER — MORPHINE SULFATE 50 MG/1
0.1 CAPSULE, EXTENDED RELEASE ORAL ONCE
Refills: 0 | Status: DISCONTINUED | OUTPATIENT
Start: 2021-10-21 | End: 2021-10-22

## 2021-10-21 RX ORDER — LIDOCAINE HCL 20 MG/ML
0.2 VIAL (ML) INJECTION ONCE
Refills: 0 | Status: DISCONTINUED | OUTPATIENT
Start: 2021-10-21 | End: 2021-10-21

## 2021-10-21 RX ORDER — SODIUM CHLORIDE 9 MG/ML
3 INJECTION INTRAMUSCULAR; INTRAVENOUS; SUBCUTANEOUS EVERY 8 HOURS
Refills: 0 | Status: DISCONTINUED | OUTPATIENT
Start: 2021-10-21 | End: 2021-10-21

## 2021-10-21 RX ORDER — CHLORHEXIDINE GLUCONATE 213 G/1000ML
1 SOLUTION TOPICAL ONCE
Refills: 0 | Status: DISCONTINUED | OUTPATIENT
Start: 2021-10-21 | End: 2021-10-21

## 2021-10-21 RX ORDER — SODIUM CHLORIDE 9 MG/ML
1000 INJECTION, SOLUTION INTRAVENOUS
Refills: 0 | Status: DISCONTINUED | OUTPATIENT
Start: 2021-10-21 | End: 2021-10-22

## 2021-10-21 RX ORDER — CELECOXIB 200 MG/1
400 CAPSULE ORAL ONCE
Refills: 0 | Status: COMPLETED | OUTPATIENT
Start: 2021-10-21 | End: 2021-10-21

## 2021-10-21 RX ORDER — KETOROLAC TROMETHAMINE 30 MG/ML
15 SYRINGE (ML) INJECTION EVERY 6 HOURS
Refills: 0 | Status: DISCONTINUED | OUTPATIENT
Start: 2021-10-21 | End: 2021-10-21

## 2021-10-21 RX ORDER — AMLODIPINE BESYLATE 2.5 MG/1
5 TABLET ORAL DAILY
Refills: 0 | Status: DISCONTINUED | OUTPATIENT
Start: 2021-10-22 | End: 2021-10-26

## 2021-10-21 RX ORDER — OXYCODONE HYDROCHLORIDE 5 MG/1
2.5 TABLET ORAL EVERY 4 HOURS
Refills: 0 | Status: DISCONTINUED | OUTPATIENT
Start: 2021-10-21 | End: 2021-10-24

## 2021-10-21 RX ORDER — HEPARIN SODIUM 5000 [USP'U]/ML
5000 INJECTION INTRAVENOUS; SUBCUTANEOUS EVERY 8 HOURS
Refills: 0 | Status: DISCONTINUED | OUTPATIENT
Start: 2021-10-21 | End: 2021-10-26

## 2021-10-21 RX ORDER — PANTOPRAZOLE SODIUM 20 MG/1
40 TABLET, DELAYED RELEASE ORAL
Refills: 0 | Status: DISCONTINUED | OUTPATIENT
Start: 2021-10-21 | End: 2021-10-24

## 2021-10-21 RX ORDER — ACETAMINOPHEN 500 MG
1000 TABLET ORAL EVERY 6 HOURS
Refills: 0 | Status: COMPLETED | OUTPATIENT
Start: 2021-10-21 | End: 2021-10-22

## 2021-10-21 RX ORDER — CEFOTETAN DISODIUM 1 G
2 VIAL (EA) INJECTION ONCE
Refills: 0 | Status: DISCONTINUED | OUTPATIENT
Start: 2021-10-21 | End: 2021-10-21

## 2021-10-21 RX ORDER — ACETAMINOPHEN 500 MG
1000 TABLET ORAL EVERY 6 HOURS
Refills: 0 | Status: DISCONTINUED | OUTPATIENT
Start: 2021-10-21 | End: 2021-10-26

## 2021-10-21 RX ORDER — ONDANSETRON 8 MG/1
4 TABLET, FILM COATED ORAL EVERY 6 HOURS
Refills: 0 | Status: DISCONTINUED | OUTPATIENT
Start: 2021-10-21 | End: 2021-10-22

## 2021-10-21 RX ORDER — ALBUTEROL 90 UG/1
2 AEROSOL, METERED ORAL
Qty: 0 | Refills: 0 | DISCHARGE

## 2021-10-21 RX ORDER — IBUPROFEN 200 MG
400 TABLET ORAL EVERY 6 HOURS
Refills: 0 | Status: DISCONTINUED | OUTPATIENT
Start: 2021-10-21 | End: 2021-10-21

## 2021-10-21 RX ORDER — INFLUENZA VIRUS VACCINE 15; 15; 15; 15 UG/.5ML; UG/.5ML; UG/.5ML; UG/.5ML
0.5 SUSPENSION INTRAMUSCULAR ONCE
Refills: 0 | Status: COMPLETED | OUTPATIENT
Start: 2021-10-21 | End: 2021-10-21

## 2021-10-21 RX ADMIN — CELECOXIB 400 MILLIGRAM(S): 200 CAPSULE ORAL at 06:45

## 2021-10-21 RX ADMIN — PANTOPRAZOLE SODIUM 40 MILLIGRAM(S): 20 TABLET, DELAYED RELEASE ORAL at 21:03

## 2021-10-21 RX ADMIN — Medication 400 MILLIGRAM(S): at 18:07

## 2021-10-21 RX ADMIN — Medication 15 MILLIGRAM(S): at 18:25

## 2021-10-21 RX ADMIN — Medication 1000 MILLIGRAM(S): at 18:25

## 2021-10-21 RX ADMIN — SODIUM CHLORIDE 40 MILLILITER(S): 9 INJECTION, SOLUTION INTRAVENOUS at 15:52

## 2021-10-21 RX ADMIN — GABAPENTIN 600 MILLIGRAM(S): 400 CAPSULE ORAL at 06:48

## 2021-10-21 RX ADMIN — HEPARIN SODIUM 5000 UNIT(S): 5000 INJECTION INTRAVENOUS; SUBCUTANEOUS at 21:03

## 2021-10-21 RX ADMIN — Medication 15 MILLIGRAM(S): at 18:08

## 2021-10-21 NOTE — BRIEF OPERATIVE NOTE - OPERATION/FINDINGS
Diagnostic laparoscopy revealing omental adhesions to hepatic flexure from prior cholecystectomy. Laparoscopic, hand assisted mobilization of the entire colon with ligation of the mesentery using ligasure cautery. B/l ureters and duodenal sweep visualized and protected. Distal margin transected at proximal rectum using a blue contour stapler. Colon delivered via the low midline incision and proximal margin transected sharply at TI. Creation of primary end-to-end ileoproctostomy using a circular transanal 29mm stapler. Anastomosis interrogated using rigid proctoscopy revealing an intact, hemostatic, pink, and patent staple line 20cm from the AV. Negative air leak test. Hemostasis ensured and fascia closed primarily using interrupted figure-of-8 Maxons utilizing the colorectal bundle closure tray. Skin closed primarily using staples over Telfa marlene.

## 2021-10-21 NOTE — BRIEF OPERATIVE NOTE - NSICDXBRIEFPOSTOP_GEN_ALL_CORE_FT
POST-OP DIAGNOSIS:  Gastrointestinal multiple polyposis syndrome 21-Oct-2021 12:50:53  Pranay London   POST-OP DIAGNOSIS:  Serrated polyposis syndrome 21-Oct-2021 13:04:14  Pranay London

## 2021-10-21 NOTE — PRE-ANESTHESIA EVALUATION ADULT - ANESTHESIA, PREVIOUS REACTION, PROFILE
aspiration pneumonia in 4/2021 after administration of propofol and colonoscopy, vomited yellowish tinge emesis

## 2021-10-21 NOTE — PRE-ANESTHESIA EVALUATION ADULT - TEMPERATURE IN CELSIUS (DEGREES C)
Results of labs and imaging reviewed and discussed with patient. UA + for UTI, dose of rocephin given. Pt also treated with diflucan for +Vaginal candidiasis noted on pelvic exam. Labs stable. Vitals stable. Pelvic US wnl. Pt re-evaluated abdomen soft nontender. Pt states feeling much better. Pt tolerated PO. Stable for dc at this time. PO abx prescribed. Pt advised to f/u with GYN and PCP 1-2 days. OB/GYN clinic number provided. Pt understood and agreed with plan. All question and concerns addressed. Strict return instructions given.
36.7

## 2021-10-21 NOTE — BRIEF OPERATIVE NOTE - NSICDXBRIEFPREOP_GEN_ALL_CORE_FT
PRE-OP DIAGNOSIS:  Gastrointestinal multiple polyposis syndrome 21-Oct-2021 12:51:12  Pranay London   PRE-OP DIAGNOSIS:  Serrated polyposis syndrome 21-Oct-2021 13:03:56  Pranay London

## 2021-10-21 NOTE — BRIEF OPERATIVE NOTE - NSICDXBRIEFPROCEDURE_GEN_ALL_CORE_FT
PROCEDURES:  Laparoscopic total abdominal colectomy and ileoproctostomy, without proctectomy 21-Oct-2021 12:49:20  Pranay London

## 2021-10-22 ENCOUNTER — TRANSCRIPTION ENCOUNTER (OUTPATIENT)
Age: 66
End: 2021-10-22

## 2021-10-22 LAB
ANION GAP SERPL CALC-SCNC: 15 MMOL/L — SIGNIFICANT CHANGE UP (ref 5–17)
BUN SERPL-MCNC: 16 MG/DL — SIGNIFICANT CHANGE UP (ref 7–23)
CALCIUM SERPL-MCNC: 8.3 MG/DL — LOW (ref 8.4–10.5)
CHLORIDE SERPL-SCNC: 106 MMOL/L — SIGNIFICANT CHANGE UP (ref 96–108)
CO2 SERPL-SCNC: 20 MMOL/L — LOW (ref 22–31)
CREAT SERPL-MCNC: 1.1 MG/DL — SIGNIFICANT CHANGE UP (ref 0.5–1.3)
GLUCOSE SERPL-MCNC: 100 MG/DL — HIGH (ref 70–99)
HCT VFR BLD CALC: 45.4 % — SIGNIFICANT CHANGE UP (ref 39–50)
HGB BLD-MCNC: 14.3 G/DL — SIGNIFICANT CHANGE UP (ref 13–17)
MAGNESIUM SERPL-MCNC: 2.1 MG/DL — SIGNIFICANT CHANGE UP (ref 1.6–2.6)
MCHC RBC-ENTMCNC: 28.2 PG — SIGNIFICANT CHANGE UP (ref 27–34)
MCHC RBC-ENTMCNC: 31.5 GM/DL — LOW (ref 32–36)
MCV RBC AUTO: 89.5 FL — SIGNIFICANT CHANGE UP (ref 80–100)
NRBC # BLD: 0 /100 WBCS — SIGNIFICANT CHANGE UP (ref 0–0)
PHOSPHATE SERPL-MCNC: 3.1 MG/DL — SIGNIFICANT CHANGE UP (ref 2.5–4.5)
PLATELET # BLD AUTO: 221 K/UL — SIGNIFICANT CHANGE UP (ref 150–400)
POTASSIUM SERPL-MCNC: 3.6 MMOL/L — SIGNIFICANT CHANGE UP (ref 3.5–5.3)
POTASSIUM SERPL-SCNC: 3.6 MMOL/L — SIGNIFICANT CHANGE UP (ref 3.5–5.3)
RBC # BLD: 5.07 M/UL — SIGNIFICANT CHANGE UP (ref 4.2–5.8)
RBC # FLD: 12.9 % — SIGNIFICANT CHANGE UP (ref 10.3–14.5)
SODIUM SERPL-SCNC: 141 MMOL/L — SIGNIFICANT CHANGE UP (ref 135–145)
WBC # BLD: 14.16 K/UL — HIGH (ref 3.8–10.5)
WBC # FLD AUTO: 14.16 K/UL — HIGH (ref 3.8–10.5)

## 2021-10-22 RX ORDER — POTASSIUM CHLORIDE 20 MEQ
40 PACKET (EA) ORAL ONCE
Refills: 0 | Status: COMPLETED | OUTPATIENT
Start: 2021-10-22 | End: 2021-10-22

## 2021-10-22 RX ADMIN — HEPARIN SODIUM 5000 UNIT(S): 5000 INJECTION INTRAVENOUS; SUBCUTANEOUS at 14:02

## 2021-10-22 RX ADMIN — HEPARIN SODIUM 5000 UNIT(S): 5000 INJECTION INTRAVENOUS; SUBCUTANEOUS at 05:31

## 2021-10-22 RX ADMIN — AMLODIPINE BESYLATE 5 MILLIGRAM(S): 2.5 TABLET ORAL at 05:30

## 2021-10-22 RX ADMIN — BUDESONIDE AND FORMOTEROL FUMARATE DIHYDRATE 2 PUFF(S): 160; 4.5 AEROSOL RESPIRATORY (INHALATION) at 18:04

## 2021-10-22 RX ADMIN — Medication 1000 MILLIGRAM(S): at 14:03

## 2021-10-22 RX ADMIN — BUDESONIDE AND FORMOTEROL FUMARATE DIHYDRATE 2 PUFF(S): 160; 4.5 AEROSOL RESPIRATORY (INHALATION) at 05:30

## 2021-10-22 RX ADMIN — OXYCODONE HYDROCHLORIDE 5 MILLIGRAM(S): 5 TABLET ORAL at 22:24

## 2021-10-22 RX ADMIN — OXYCODONE HYDROCHLORIDE 5 MILLIGRAM(S): 5 TABLET ORAL at 06:46

## 2021-10-22 RX ADMIN — Medication 81 MILLIGRAM(S): at 14:02

## 2021-10-22 RX ADMIN — Medication 400 MILLIGRAM(S): at 05:29

## 2021-10-22 RX ADMIN — PANTOPRAZOLE SODIUM 40 MILLIGRAM(S): 20 TABLET, DELAYED RELEASE ORAL at 06:33

## 2021-10-22 RX ADMIN — Medication 1000 MILLIGRAM(S): at 23:54

## 2021-10-22 RX ADMIN — HEPARIN SODIUM 5000 UNIT(S): 5000 INJECTION INTRAVENOUS; SUBCUTANEOUS at 20:55

## 2021-10-22 RX ADMIN — Medication 400 MILLIGRAM(S): at 00:01

## 2021-10-22 RX ADMIN — Medication 400 MILLIGRAM(S): at 14:02

## 2021-10-22 RX ADMIN — OXYCODONE HYDROCHLORIDE 5 MILLIGRAM(S): 5 TABLET ORAL at 20:56

## 2021-10-22 RX ADMIN — Medication 40 MILLIEQUIVALENT(S): at 14:02

## 2021-10-22 RX ADMIN — OXYCODONE HYDROCHLORIDE 5 MILLIGRAM(S): 5 TABLET ORAL at 06:35

## 2021-10-22 RX ADMIN — Medication 1000 MILLIGRAM(S): at 05:58

## 2021-10-22 NOTE — DISCHARGE NOTE PROVIDER - NSDCMRMEDTOKEN_GEN_ALL_CORE_FT
amLODIPine 5 mg oral tablet: 1 tab(s) orally once a day  aspirin 81 mg oral tablet: 1 tab(s) orally once a day  Bystolic 10 mg oral tablet: 1 tab(s) orally once a day  Edarbi 40 mg oral tablet: 1 tab(s) orally once a day  Lipitor 40 mg oral tablet: 1 tab(s) orally once a day  sucralfate 1 g oral tablet: 1 tab(s) orally 4 times a day (before meals and at bedtime)  Symbicort 80 mcg-4.5 mcg/inh inhalation aerosol: 2 puff(s) inhaled 2 times a day  Xanax 0.5 mg oral tablet: 1 tab(s) orally 3 times a day   amLODIPine 5 mg oral tablet: 1 tab(s) orally once a day  aspirin 81 mg oral tablet, chewable: 1 tab(s) orally once a day  Bystolic 10 mg oral tablet: 1 tab(s) orally once a day  Edarbi 40 mg oral tablet: 1 tab(s) orally once a day  Lipitor 40 mg oral tablet: 1 tab(s) orally once a day  sucralfate 1 g oral tablet: 1 tab(s) orally 4 times a day (before meals and at bedtime)  Symbicort 80 mcg-4.5 mcg/inh inhalation aerosol: 2 puff(s) inhaled 2 times a day  Xanax 0.5 mg oral tablet: 1 tab(s) orally 3 times a day

## 2021-10-22 NOTE — DISCHARGE NOTE PROVIDER - CARE PROVIDER_API CALL
Satya Tirado)  ColonRectal Surgery; Surgery  [address], [address]  [place] [address]  Phone: [phone]  Fax: [phone]  Follow Up Time: 1 week

## 2021-10-22 NOTE — PROGRESS NOTE ADULT - SUBJECTIVE AND OBJECTIVE BOX
Day 1 of Anesthesia Pain Management Service    SUBJECTIVE:  Pain Scale Score:          [X] Refer to charted pain scores    THERAPY:    s/p 0.1 mg PF morphine on 10/21      MEDICATIONS  (STANDING):  acetaminophen     Tablet .. 1000 milliGRAM(s) Oral every 6 hours  acetaminophen   IVPB .. 1000 milliGRAM(s) IV Intermittent every 6 hours  amLODIPine   Tablet 5 milliGRAM(s) Oral daily  aspirin  chewable 81 milliGRAM(s) Oral daily  budesonide  80 MICROgram(s)/formoterol 4.5 MICROgram(s) Inhaler 2 Puff(s) Inhalation two times a day  heparin   Injectable 5000 Unit(s) SubCutaneous every 8 hours  influenza   Vaccine 0.5 milliLiter(s) IntraMuscular once  lactated ringers. 1000 milliLiter(s) (40 mL/Hr) IV Continuous <Continuous>  pantoprazole    Tablet 40 milliGRAM(s) Oral before breakfast  potassium chloride    Tablet ER 40 milliEquivalent(s) Oral once    MEDICATIONS  (PRN):  oxyCODONE    IR 2.5 milliGRAM(s) Oral every 4 hours PRN Moderate Pain (4 - 6)  oxyCODONE    IR 5 milliGRAM(s) Oral every 4 hours PRN Severe Pain (7 - 10)      OBJECTIVE:    Sedation:        	[X] Alert	[ ] Drowsy	[ ] Arousable      [ ] Asleep       [ ] Unresponsive    Side Effects:	[X] None	[ ] Nausea	[ ] Vomiting         [ ] Pruritus  		[ ] Weakness            [ ] Numbness	          [ ] Other:    Vital Signs Last 24 Hrs  T(C): 36.6 (22 Oct 2021 08:47), Max: 37.2 (22 Oct 2021 01:57)  T(F): 97.9 (22 Oct 2021 08:47), Max: 99 (22 Oct 2021 01:57)  HR: 62 (22 Oct 2021 08:47) (59 - 95)  BP: 123/67 (22 Oct 2021 08:47) (97/56 - 124/68)  BP(mean): 89 (21 Oct 2021 16:00) (68 - 89)  RR: 18 (22 Oct 2021 08:47) (15 - 19)  SpO2: 95% (22 Oct 2021 08:47) (93% - 99%)    ASSESSMENT/ PLAN  [X] Patient transitioned to prn analgesics  [X] Pain management per primary service, pain service to sign off   [X]Documentation and Verification of current medications

## 2021-10-22 NOTE — DIETITIAN INITIAL EVALUATION ADULT. - ORAL INTAKE PTA/DIET HISTORY
Pt reports good appetite/PO intake PTA. Food allergies: aspartame, artificial sweeteners (anaphylaxis), no intolerances reported. No therapeutic restrictions. Micronutrient supplementation: multivitamin. Protein-energy supplementation: none.

## 2021-10-22 NOTE — PROGRESS NOTE ADULT - ASSESSMENT
The patient is a 66y Male who is now several hours post-op from a laparoscopic total abdominal colectomy and ileoproctostomy, without proctectomy. Now recovering well on floor.     Plan:  - Pain control as needed. Received duramorph, on tylenol and oxycodone   - DVT ppx  - OOB and ambulating as tolerated  - F/u AM labs  - Monitor I&O's   - DIET: Will advance to clears, if pt tolerating will start LRD for dinner   - TOV after ucath/fang removal    [id number]  Red Surgery

## 2021-10-22 NOTE — DISCHARGE NOTE PROVIDER - NSDCCPCAREPLAN_GEN_ALL_CORE_FT
PRINCIPAL DISCHARGE DIAGNOSIS  Diagnosis: Colon polyps  Assessment and Plan of Treatment: WOUND CARE: Dry gauze and paper tape for midline incision,  Staples will be removed in the office.  BATHING: You may shower and/or sponge bathe.  ACTIVITY: No heavy lifting anything more than 10-15lbs or straining. Otherwise, you may return to your usual level of physical activity. If you are taking narcotic pain medication (such as Percocet), do NOT drive a car, operate machinery or make important decisions.  NOTIFY YOUR SURGEON IF: You have any bleeding that does not stop, any fever (over 100.4 F) or chills, persistent nausea/vomiting with inability to tolerate food or liquids, persistent diarrhea, or if your pain is not controlled on your discharge pain medications.  FOLLOW-UP:  Please follow up with  in one week regarding your hospitalization.       PRINCIPAL DISCHARGE DIAGNOSIS  Diagnosis: Colon polyps  Assessment and Plan of Treatment: WOUND CARE: Dry gauze and paper tape for midline incision,  Staples will be removed in the office.  BATHING: You may shower and/or sponge bathe.  ACTIVITY: No heavy lifting anything more than 10-15lbs or straining. Otherwise, you may return to your usual level of physical activity. If you are taking narcotic pain medication (such as Percocet), do NOT drive a car, operate machinery or make important decisions.  NOTIFY YOUR SURGEON IF: You have any bleeding that does not stop, any fever (over 100.4 F) or chills, persistent nausea/vomiting with inability to tolerate food or liquids, persistent diarrhea, or if your pain is not controlled on your discharge pain medications.  FOLLOW-UP:  Please follow up with  in one week regarding your hospitalization.      SECONDARY DISCHARGE DIAGNOSES  Diagnosis: Need for prophylactic measure  Assessment and Plan of Treatment: Based on your calculated Caprini Score, it was determined  you are at a higher risk to developing a deep vein thrombosis (DVT) or a pulmonary embolus (PE) post operatively.  You are encouraged to ambulate regulary at home and start the anticoagulation medication Eliquis.  Please take as prescribed. The prescription has been sent to [facility] at [facility].   Please call your Primary Care physician or Surgeon and return to ER  if you have any swelling and/or pain of your extremities, feel short of breath or having any difficulty breathing.

## 2021-10-22 NOTE — DIETITIAN INITIAL EVALUATION ADULT. - CHIEF COMPLAINT
Per chart "The patient is a 66y Male who is now several hours post-op from a laparoscopic total abdominal colectomy and ileoproctostomy, without proctectomy. Now recovering well on floor. "

## 2021-10-22 NOTE — CHART NOTE - NSCHARTNOTEFT_GEN_A_CORE
POST-OPERATIVE NOTE    Subjective:  Patient is s/p laparoscopic total abdominal colectomy and ileoproctostomy without proctectomy. Upon exam it was noted the ureteral catheter was leaking and soaked the patients gown and blanket. NF intern and senior resident replaced ureteral catheter into the fang and reinforced it with tegaderm. Patient was given a fresh gown and blanket and elena was replaced. Patient stated pain is controlled, denied nausea and vomiting. Denied any other issues. Recovering appropriately.     Vital Signs Last 24 Hrs  T(C): 36.8 (21 Oct 2021 20:10), Max: 36.9 (21 Oct 2021 15:30)  T(F): 98.3 (21 Oct 2021 20:10), Max: 98.4 (21 Oct 2021 15:30)  HR: 62 (21 Oct 2021 20:10) (59 - 95)  BP: 116/68 (21 Oct 2021 20:10) (97/56 - 124/68)  BP(mean): 89 (21 Oct 2021 16:00) (68 - 89)  RR: 18 (21 Oct 2021 20:10) (15 - 19)  SpO2: 96% (21 Oct 2021 20:10) (96% - 99%)  I&O's Detail    21 Oct 2021 07:01  -  22 Oct 2021 00:28  --------------------------------------------------------  IN:    Lactated Ringers: 200 mL  Total IN: 200 mL    OUT:    Indwelling Catheter - Urethral (mL): 810 mL  Total OUT: 810 mL    Total NET: -610 mL        amLODIPine   Tablet 5  aspirin  chewable 81  heparin   Injectable 5000    PAST MEDICAL & SURGICAL HISTORY:  HTN (hypertension)    CAD (coronary artery disease)  coronary artery stenting 2017 (LAD x1 stent)    COPD without exacerbation  uses symbicort PRN    Thoracic aortic aneurysm  diagnosed 4 years ago (4.2 no change per patient, last CT 2021- Dr. Cope)  states was evaluated by Dr. Lancaster and advised no need for surgery, just monitoring.    H/O unstable angina  prompted cardiac cath in 2017  stent x 1 LAD    Current smoker  1 cigar twice a week   smoking started in 20&#x27;s    Lung nodule    Liver nodule    History of cholecystectomy    GERD (gastroesophageal reflux disease)    Aspiration pneumonia  April 2021    Fatty liver    S/P hernia repair  bilateral    H/O umbilical hernia repair          Physical Exam:  General: NAD, resting comfortably in bed  Pulmonary: Nonlabored breathing, no respiratory distress  Cardiovascular: NSR  Abdominal: soft, NT/ND, abdominal dressings c/d/i   Extremities: WWP      LABS:                        15.2   12.57 )-----------( 217      ( 21 Oct 2021 13:14 )             46.2     10-21    141  |  104  |  17  ----------------------------<  148<H>  3.9   |  19<L>  |  1.37<H>    Ca    8.0<L>      21 Oct 2021 13:14  Phos  3.9     10-21  Mg     2.0     10-21        CAPILLARY BLOOD GLUCOSE      POCT Blood Glucose.: 119 mg/dL (21 Oct 2021 06:22)      Radiology and Additional Studies:    Assessment:  The patient is a 66y Male who is now several hours post-op from a     Plan:  - Pain control as needed  - DVT ppx  - OOB and ambulating as tolerated  - F/u AM labs  - Monitor I&O's   - Possibly advance diet in the AM after rounds

## 2021-10-22 NOTE — DIETITIAN INITIAL EVALUATION ADULT. - PERTINENT MEDS FT
MEDICATIONS  (STANDING):  acetaminophen     Tablet .. 1000 milliGRAM(s) Oral every 6 hours  acetaminophen   IVPB .. 1000 milliGRAM(s) IV Intermittent every 6 hours  amLODIPine   Tablet 5 milliGRAM(s) Oral daily  aspirin  chewable 81 milliGRAM(s) Oral daily  budesonide  80 MICROgram(s)/formoterol 4.5 MICROgram(s) Inhaler 2 Puff(s) Inhalation two times a day  heparin   Injectable 5000 Unit(s) SubCutaneous every 8 hours  influenza   Vaccine 0.5 milliLiter(s) IntraMuscular once  lactated ringers. 1000 milliLiter(s) (40 mL/Hr) IV Continuous <Continuous>  pantoprazole    Tablet 40 milliGRAM(s) Oral before breakfast  potassium chloride    Tablet ER 40 milliEquivalent(s) Oral once    MEDICATIONS  (PRN):  oxyCODONE    IR 2.5 milliGRAM(s) Oral every 4 hours PRN Moderate Pain (4 - 6)  oxyCODONE    IR 5 milliGRAM(s) Oral every 4 hours PRN Severe Pain (7 - 10)

## 2021-10-22 NOTE — PROGRESS NOTE ADULT - SUBJECTIVE AND OBJECTIVE BOX
24h Events:  No acute events overnight.    Subjective:   Patient seen at bedside this AM. Denies n/v, SOB, CP, fevers/chills.    Objective:  Vital Signs  T(C): 37.2 (10-22 @ 01:57), Max: 37.2 (10-22 @ 01:57)  HR: 60 (10-22 @ 01:57) (59 - 95)  BP: 106/60 (10-22 @ 01:57) (97/56 - 124/68)  RR: 18 (10-22 @ 01:57) (15 - 19)  SpO2: 93% (10-22 @ 01:57) (93% - 99%)  10-21-21 @ 07:01  -  10-22-21 @ 02:56  --------------------------------------------------------  IN:  Total IN: 0 mL    OUT:    Indwelling Catheter - Urethral (mL): 910 mL  Total OUT: 910 mL    Total NET: -910 mL          Physical Exam:  GEN:   RESP:   ABD:   EXTR:   NEURO:     Labs:                        15.2   12.57 )-----------( 217      ( 21 Oct 2021 13:14 )             46.2   10-21    141  |  104  |  17  ----------------------------<  148<H>  3.9   |  19<L>  |  1.37<H>    Ca    8.0<L>      21 Oct 2021 13:14  Phos  3.9     10-21  Mg     2.0     10-21      CAPILLARY BLOOD GLUCOSE      POCT Blood Glucose.: 119 mg/dL (21 Oct 2021 06:22)      Imaging:     24h Events:  No acute events overnight.    Subjective:   Patient seen at bedside this AM. He reports pain is well controlled. Reports passing gas,- BM. Denies n/v, SOB, CP, fevers/chills.    Objective:  Vital Signs  T(C): 37.2 (10-22 @ 01:57), Max: 37.2 (10-22 @ 01:57)  HR: 60 (10-22 @ 01:57) (59 - 95)  BP: 106/60 (10-22 @ 01:57) (97/56 - 124/68)  RR: 18 (10-22 @ 01:57) (15 - 19)  SpO2: 93% (10-22 @ 01:57) (93% - 99%)  10-21-21 @ 07:01  -  10-22-21 @ 02:56  --------------------------------------------------------  IN:  Total IN: 0 mL    OUT:    Indwelling Catheter - Urethral (mL): 910 mL  Total OUT: 910 mL  Total NET: -910 mL    Physical Exam:  General: NAD, resting comfortably in bed  Pulmonary: Nonlabored breathing, no respiratory distress  Abdominal: soft, NT/ND, abdominal dressings c/d/i - midline dressing with marlene*  Extremities: [facility]      Labs:                        15.2   12.57 )-----------( 217      ( 21 Oct 2021 13:14 )             46.2   10-21    141  |  104  |  17  ----------------------------<  148<H>  3.9   |  19<L>  |  1.37<H>    Ca    8.0<L>      21 Oct 2021 13:14  Phos  3.9     10-21  Mg     2.0     10-21      CAPILLARY BLOOD GLUCOSE      POCT Blood Glucose.: 119 mg/dL (21 Oct 2021 06:22)      Imaging:

## 2021-10-22 NOTE — DIETITIAN INITIAL EVALUATION ADULT. - REASON
no overt signs of muscle/fat depletion upon visual observation; nutrition focused physical examination not indicated at this time

## 2021-10-22 NOTE — DIETITIAN INITIAL EVALUATION ADULT. - OTHER INFO
Pt endorses tolerating the clear liquid diet well. Reviewed clear liquid diet tray items and protein sources, Pt is not amenable to drinking Promote/Ensure Clear supplements secondary to dislike of taste. Pt declined provision of high protein gelatin to optimize protein-energy intake, declined alternative oral nutritional supplement.    Last BM was today (10/22) 2x, denies diarrhea/constipation. Bowel regimen: none     pounds x 2 weeks ago, denies weight changes. Current admission weight of 186.1 pounds (dosing 10/21). Indicates Pt is wt stable at this time. RD to continue to monitor weight trends as available/able.    Hx of pre-DM per Pt, not managed with medication. Does not check Finger sticks. Hgba1c 5.9%, Finger sticks and glucose elevated in-house. Indicates suboptimal glucose control PTA. RD to continue to monitor BG trends as available/able.

## 2021-10-22 NOTE — DIETITIAN INITIAL EVALUATION ADULT. - PERSON TAUGHT/METHOD
verbal instruction/written material/individual instruction/patient instructed/daughter instructed/teach back - (Patient repeats in own words)

## 2021-10-22 NOTE — DISCHARGE NOTE PROVIDER - NSDCFUSCHEDAPPT_GEN_ALL_CORE_FT
ANITRA GARDNER ; 12/15/2021 ; [facility]  [facility] ANITRA Whalen ; 12/15/2021 ; [facility]  [facility]

## 2021-10-22 NOTE — DIETITIAN INITIAL EVALUATION ADULT. - ADD RECOMMEND
1) As medically feasible/tolerated recommend advancing diet from clear liquids --> full liquids --> low fiber diet. 2) Review nutrition education as indicated. 3) Continue to monitor and replete electrolytes as needed. 4) Monitor PO intake/tolerance, weights, labs, hydration status, skin integrity

## 2021-10-22 NOTE — DISCHARGE NOTE PROVIDER - HOSPITAL COURSE
patient is a 66y.o m that presented to [facility] For a laparoscopic total abdominal colectomy and ileoproctostomy without proctectomy.  In the PACU, the patients' pain was controlled, vitals stable, tolerating PO, and voiding appropriately via fang.  The patient was transferred to the surgical floor in stable condition.   ERP protocol was followed. Patient's u cath and fang removed pod1 and patient passed TOV. Patient;s diet was advanced to clears and then regular.  He passed gas and continued to have GI function.  On day of discharge, the patient was tolerating diet, ambulating well and pain controlled. All questions were answered and patient safely discharged. Patient is a 66 year old Male with PMH of serrated polyposis syndrome, presented to [facility] for a laparoscopic total abdominal colectomy and ileoproctostomy without proctectomy.    10/21/21 Patient underwent uncomplicated laparoscopic total abdominal colectomy and ileoproctostomy, without proctectomy. He was transferred   to PACU after surgery. In the PACU, the patients' pain was controlled, vitals stable, tolerating PO, and voiding appropriately via Purvis.  The patient was transferred to the surgical floor in stable condition.   ERP protocol was followed. Patient's u cath and Purvis removed on POD1 and patient passed TOV. Patient's diet was advanced to clears and then regular.  He passed gas and continued to have GI function.    10/24/21. On POD3 patient developed severe diarrhea and nausea. He had up to 20 episodes of watery diarrhea. He was given IV bolus to replete his   fluid loss and electrolytes. Abdominal x-ray was performed which demonstrated an ileus. An NGT was placed which significantly improved patients condition. His diarrhea resolved, and bowel  function returned. NGT was removed and patient reported he feels good.      10/26/21. On day of discharge, the patient was tolerating diet, ambulating well and pain controlled. All questions were answered and patient safely discharged. Patient is a 66 year old Male with PMH of serrated polyposis syndrome, presented to [facility] for a laparoscopic total abdominal colectomy and ileoproctostomy without proctectomy.    10/21/21 Patient underwent uncomplicated laparoscopic total abdominal colectomy and ileoproctostomy, without proctectomy. He was transferred   to PACU after surgery. In the PACU, the patients' pain was controlled, vitals stable, tolerating PO, and voiding appropriately via Purvis.  The patient was transferred to the surgical floor in stable condition.   ERP protocol was followed. Patient's u cath and Purvis removed on POD1 and patient passed TOV. Patient's diet was advanced to clears and then regular.  He passed gas and continued to have GI function.    10/24/21. On POD3 patient developed severe diarrhea and nausea. He had up to 20 episodes of watery diarrhea. He was given IV bolus to replete his   fluid loss and electrolytes. Abdominal x-ray was performed which demonstrated an ileus. An NGT was placed which significantly improved patients condition. His diarrhea resolved, and bowel  function returned. NGT was removed and patient reported he feels good.      10/26/21. On day of discharge, the patient was tolerating  clear diet, ambulating well and pain controlled. All questions were answered and patient discharged against medical advise.

## 2021-10-22 NOTE — DIETITIAN INITIAL EVALUATION ADULT. - PERTINENT LABORATORY DATA
10-22 Na 141 mmol/L Glu 100 mg/dL<H> K+ 3.6 mmol/L Cr 1.10 mg/dL BUN 16 mg/dL Phos 3.1 mg/dL Alb n/a   PAB n/a   Hgb 14.3 g/dL Hct 45.4 %  Glucose, Serum: 100 mg/dL *H    Hgba1c 5.9%

## 2021-10-22 NOTE — DIETITIAN INITIAL EVALUATION ADULT. - SIGNS/SYMPTOMS
POD1 laparoscopic total abdominal colectomy and ileoproctostomy Hgba1c 5.9%, elevated Finger sticks and serum glucose

## 2021-10-23 LAB
ANION GAP SERPL CALC-SCNC: 17 MMOL/L — SIGNIFICANT CHANGE UP (ref 5–17)
BUN SERPL-MCNC: 19 MG/DL — SIGNIFICANT CHANGE UP (ref 7–23)
CALCIUM SERPL-MCNC: 8.7 MG/DL — SIGNIFICANT CHANGE UP (ref 8.4–10.5)
CHLORIDE SERPL-SCNC: 105 MMOL/L — SIGNIFICANT CHANGE UP (ref 96–108)
CO2 SERPL-SCNC: 18 MMOL/L — LOW (ref 22–31)
CREAT SERPL-MCNC: 1.1 MG/DL — SIGNIFICANT CHANGE UP (ref 0.5–1.3)
GLUCOSE SERPL-MCNC: 125 MG/DL — HIGH (ref 70–99)
HCT VFR BLD CALC: 51.7 % — HIGH (ref 39–50)
HGB BLD-MCNC: 16.6 G/DL — SIGNIFICANT CHANGE UP (ref 13–17)
MAGNESIUM SERPL-MCNC: 2.3 MG/DL — SIGNIFICANT CHANGE UP (ref 1.6–2.6)
MCHC RBC-ENTMCNC: 28.6 PG — SIGNIFICANT CHANGE UP (ref 27–34)
MCHC RBC-ENTMCNC: 32.1 GM/DL — SIGNIFICANT CHANGE UP (ref 32–36)
MCV RBC AUTO: 89.1 FL — SIGNIFICANT CHANGE UP (ref 80–100)
NRBC # BLD: 0 /100 WBCS — SIGNIFICANT CHANGE UP (ref 0–0)
PHOSPHATE SERPL-MCNC: 3.9 MG/DL — SIGNIFICANT CHANGE UP (ref 2.5–4.5)
PLATELET # BLD AUTO: 259 K/UL — SIGNIFICANT CHANGE UP (ref 150–400)
POTASSIUM SERPL-MCNC: 4 MMOL/L — SIGNIFICANT CHANGE UP (ref 3.5–5.3)
POTASSIUM SERPL-SCNC: 4 MMOL/L — SIGNIFICANT CHANGE UP (ref 3.5–5.3)
RBC # BLD: 5.8 M/UL — SIGNIFICANT CHANGE UP (ref 4.2–5.8)
RBC # FLD: 13.2 % — SIGNIFICANT CHANGE UP (ref 10.3–14.5)
SODIUM SERPL-SCNC: 140 MMOL/L — SIGNIFICANT CHANGE UP (ref 135–145)
WBC # BLD: 13.57 K/UL — HIGH (ref 3.8–10.5)
WBC # FLD AUTO: 13.57 K/UL — HIGH (ref 3.8–10.5)

## 2021-10-23 RX ORDER — SUCRALFATE 1 G
1 TABLET ORAL
Refills: 0 | Status: DISCONTINUED | OUTPATIENT
Start: 2021-10-23 | End: 2021-10-24

## 2021-10-23 RX ORDER — OXYCODONE HYDROCHLORIDE 5 MG/1
5 TABLET ORAL ONCE
Refills: 0 | Status: DISCONTINUED | OUTPATIENT
Start: 2021-10-23 | End: 2021-10-23

## 2021-10-23 RX ORDER — LOPERAMIDE HCL 2 MG
2 TABLET ORAL EVERY 6 HOURS
Refills: 0 | Status: DISCONTINUED | OUTPATIENT
Start: 2021-10-23 | End: 2021-10-24

## 2021-10-23 RX ORDER — ACETAMINOPHEN 500 MG
650 TABLET ORAL ONCE
Refills: 0 | Status: COMPLETED | OUTPATIENT
Start: 2021-10-23 | End: 2021-10-23

## 2021-10-23 RX ORDER — ACETAMINOPHEN 500 MG
325 TABLET ORAL EVERY 4 HOURS
Refills: 0 | Status: DISCONTINUED | OUTPATIENT
Start: 2021-10-23 | End: 2021-10-24

## 2021-10-23 RX ORDER — SODIUM CHLORIDE 9 MG/ML
500 INJECTION INTRAMUSCULAR; INTRAVENOUS; SUBCUTANEOUS ONCE
Refills: 0 | Status: COMPLETED | OUTPATIENT
Start: 2021-10-23 | End: 2021-10-23

## 2021-10-23 RX ORDER — SIMETHICONE 80 MG/1
80 TABLET, CHEWABLE ORAL EVERY 6 HOURS
Refills: 0 | Status: DISCONTINUED | OUTPATIENT
Start: 2021-10-23 | End: 2021-10-24

## 2021-10-23 RX ADMIN — OXYCODONE HYDROCHLORIDE 5 MILLIGRAM(S): 5 TABLET ORAL at 19:24

## 2021-10-23 RX ADMIN — OXYCODONE HYDROCHLORIDE 5 MILLIGRAM(S): 5 TABLET ORAL at 06:08

## 2021-10-23 RX ADMIN — Medication 650 MILLIGRAM(S): at 23:06

## 2021-10-23 RX ADMIN — OXYCODONE HYDROCHLORIDE 5 MILLIGRAM(S): 5 TABLET ORAL at 19:54

## 2021-10-23 RX ADMIN — BUDESONIDE AND FORMOTEROL FUMARATE DIHYDRATE 2 PUFF(S): 160; 4.5 AEROSOL RESPIRATORY (INHALATION) at 16:50

## 2021-10-23 RX ADMIN — OXYCODONE HYDROCHLORIDE 5 MILLIGRAM(S): 5 TABLET ORAL at 00:09

## 2021-10-23 RX ADMIN — OXYCODONE HYDROCHLORIDE 5 MILLIGRAM(S): 5 TABLET ORAL at 17:40

## 2021-10-23 RX ADMIN — SIMETHICONE 80 MILLIGRAM(S): 80 TABLET, CHEWABLE ORAL at 22:37

## 2021-10-23 RX ADMIN — Medication 2 MILLIGRAM(S): at 22:37

## 2021-10-23 RX ADMIN — OXYCODONE HYDROCHLORIDE 5 MILLIGRAM(S): 5 TABLET ORAL at 05:13

## 2021-10-23 RX ADMIN — Medication 650 MILLIGRAM(S): at 22:36

## 2021-10-23 RX ADMIN — OXYCODONE HYDROCHLORIDE 5 MILLIGRAM(S): 5 TABLET ORAL at 08:37

## 2021-10-23 RX ADMIN — OXYCODONE HYDROCHLORIDE 5 MILLIGRAM(S): 5 TABLET ORAL at 03:01

## 2021-10-23 RX ADMIN — BUDESONIDE AND FORMOTEROL FUMARATE DIHYDRATE 2 PUFF(S): 160; 4.5 AEROSOL RESPIRATORY (INHALATION) at 05:14

## 2021-10-23 RX ADMIN — Medication 2 MILLIGRAM(S): at 17:21

## 2021-10-23 RX ADMIN — Medication 81 MILLIGRAM(S): at 13:55

## 2021-10-23 RX ADMIN — OXYCODONE HYDROCHLORIDE 5 MILLIGRAM(S): 5 TABLET ORAL at 17:24

## 2021-10-23 RX ADMIN — OXYCODONE HYDROCHLORIDE 5 MILLIGRAM(S): 5 TABLET ORAL at 08:55

## 2021-10-23 RX ADMIN — AMLODIPINE BESYLATE 5 MILLIGRAM(S): 2.5 TABLET ORAL at 05:11

## 2021-10-23 RX ADMIN — Medication 1 GRAM(S): at 16:50

## 2021-10-23 RX ADMIN — Medication 1 GRAM(S): at 13:55

## 2021-10-23 RX ADMIN — HEPARIN SODIUM 5000 UNIT(S): 5000 INJECTION INTRAVENOUS; SUBCUTANEOUS at 13:56

## 2021-10-23 RX ADMIN — PANTOPRAZOLE SODIUM 40 MILLIGRAM(S): 20 TABLET, DELAYED RELEASE ORAL at 06:08

## 2021-10-23 RX ADMIN — HEPARIN SODIUM 5000 UNIT(S): 5000 INJECTION INTRAVENOUS; SUBCUTANEOUS at 05:10

## 2021-10-23 RX ADMIN — HEPARIN SODIUM 5000 UNIT(S): 5000 INJECTION INTRAVENOUS; SUBCUTANEOUS at 21:21

## 2021-10-23 RX ADMIN — SODIUM CHLORIDE 500 MILLILITER(S): 9 INJECTION INTRAMUSCULAR; INTRAVENOUS; SUBCUTANEOUS at 21:20

## 2021-10-23 NOTE — PROGRESS NOTE ADULT - ASSESSMENT
The patient is a 66y Male who is now POD2 s/p laparoscopic total abdominal colectomy and ileoproctostomy, without proctectomy. Now recovering well on floor.     Plan:  - Pain control as needed.   - DVT ppx  - OOB and ambulating as tolerated  - F/u AM labs  - Monitor I&O's   - DIET: Will advance to clears, if pt tolerating will start LRD for dinner   - Monitor u/o  - Immodium if diarrhea continues    x9002  [facility]

## 2021-10-23 NOTE — PROGRESS NOTE ADULT - SUBJECTIVE AND OBJECTIVE BOX
24h Events:  No acute events overnight.    Subjective:   Patient seen at bedside this AM. Denies n/v, SOB, CP, fevers/chills.    Objective:  Vital Signs  T(C): 37.2 (10-22 @ 20:43), Max: 37.2 (10-22 @ 01:57)  HR: 72 (10-22 @ 20:43) (53 - 77)  BP: 128/71 (10-22 @ 20:43) (100/61 - 128/71)  RR: 18 (10-22 @ 20:43) (18 - 18)  SpO2: 95% (10-22 @ 20:43) (93% - 95%)  10-21-21 @ 07:01  -  10-22-21 @ 07:00  --------------------------------------------------------  IN:  Total IN: 0 mL    OUT:    Indwelling Catheter - Urethral (mL): 1035 mL  Total OUT: 1035 mL    Total NET: -1035 mL      10-22-21 @ 07:01  -  10-23-21 @ 00:07  --------------------------------------------------------  IN:  Total IN: 0 mL    OUT:    Indwelling Catheter - Urethral (mL): 450 mL    Voided (mL): 200 mL  Total OUT: 650 mL    Total NET: -650 mL          Physical Exam:  GEN:   RESP:   ABD:   EXTR:   NEURO:     Labs:                        14.3   14.16 )-----------( 221      ( 22 Oct 2021 07:03 )             45.4   10-22    141  |  106  |  16  ----------------------------<  100<H>  3.6   |  20<L>  |  1.10    Ca    8.3<L>      22 Oct 2021 07:03  Phos  3.1     10-22  Mg     2.1     10-22      CAPILLARY BLOOD GLUCOSE          Imaging:     24h Events:  No acute events overnight.    Subjective:   Patient seen at bedside this AM. Patient reports having multiple episodes of diarrhea. His abdomen is mildly tender to palpation.  Denies n/v, SOB, CP, fevers/chills. His pain is controlled, ambulating and urinating without any issues.     Objective:  Vital Signs  T(C): 37.2 (10-22 @ 20:43), Max: 37.2 (10-22 @ 01:57)  HR: 72 (10-22 @ 20:43) (53 - 77)  BP: 128/71 (10-22 @ 20:43) (100/61 - 128/71)  RR: 18 (10-22 @ 20:43) (18 - 18)  SpO2: 95% (10-22 @ 20:43) (93% - 95%)  10-21-21 @ 07:01  -  10-22-21 @ 07:00  --------------------------------------------------------  IN:  Total IN: 0 mL    OUT:    Indwelling Catheter - Urethral (mL): 1035 mL  Total OUT: 1035 mL    Total NET: -1035 mL      10-22-21 @ 07:01  -  10-23-21 @ 00:07  --------------------------------------------------------  IN:  Total IN: 0 mL    OUT:    Indwelling Catheter - Urethral (mL): 450 mL    Voided (mL): 200 mL  Total OUT: 650 mL    Total NET: -650 mL          Physical Exam:  GEN:   RESP:   ABD:   EXTR:   NEURO:     Labs:                        14.3   14.16 )-----------( 221      ( 22 Oct 2021 07:03 )             45.4   10-22    141  |  106  |  16  ----------------------------<  100<H>  3.6   |  20<L>  |  1.10    Ca    8.3<L>      22 Oct 2021 07:03  Phos  3.1     10-22  Mg     2.1     10-22      CAPILLARY BLOOD GLUCOSE          Imaging:

## 2021-10-24 LAB
ANION GAP SERPL CALC-SCNC: 21 MMOL/L — HIGH (ref 5–17)
BUN SERPL-MCNC: 34 MG/DL — HIGH (ref 7–23)
CALCIUM SERPL-MCNC: 9.8 MG/DL — SIGNIFICANT CHANGE UP (ref 8.4–10.5)
CHLORIDE SERPL-SCNC: 104 MMOL/L — SIGNIFICANT CHANGE UP (ref 96–108)
CO2 SERPL-SCNC: 15 MMOL/L — LOW (ref 22–31)
CREAT SERPL-MCNC: 1.26 MG/DL — SIGNIFICANT CHANGE UP (ref 0.5–1.3)
GLUCOSE SERPL-MCNC: 130 MG/DL — HIGH (ref 70–99)
HCT VFR BLD CALC: 51.1 % — HIGH (ref 39–50)
HGB BLD-MCNC: 16.9 G/DL — SIGNIFICANT CHANGE UP (ref 13–17)
MAGNESIUM SERPL-MCNC: 2.3 MG/DL — SIGNIFICANT CHANGE UP (ref 1.6–2.6)
MCHC RBC-ENTMCNC: 29.4 PG — SIGNIFICANT CHANGE UP (ref 27–34)
MCHC RBC-ENTMCNC: 33.1 GM/DL — SIGNIFICANT CHANGE UP (ref 32–36)
MCV RBC AUTO: 89 FL — SIGNIFICANT CHANGE UP (ref 80–100)
NRBC # BLD: 0 /100 WBCS — SIGNIFICANT CHANGE UP (ref 0–0)
PHOSPHATE SERPL-MCNC: 4.3 MG/DL — SIGNIFICANT CHANGE UP (ref 2.5–4.5)
PLATELET # BLD AUTO: 284 K/UL — SIGNIFICANT CHANGE UP (ref 150–400)
POTASSIUM SERPL-MCNC: 4.1 MMOL/L — SIGNIFICANT CHANGE UP (ref 3.5–5.3)
POTASSIUM SERPL-SCNC: 4.1 MMOL/L — SIGNIFICANT CHANGE UP (ref 3.5–5.3)
RBC # BLD: 5.74 M/UL — SIGNIFICANT CHANGE UP (ref 4.2–5.8)
RBC # FLD: 13.3 % — SIGNIFICANT CHANGE UP (ref 10.3–14.5)
SODIUM SERPL-SCNC: 140 MMOL/L — SIGNIFICANT CHANGE UP (ref 135–145)
WBC # BLD: 15.14 K/UL — HIGH (ref 3.8–10.5)
WBC # FLD AUTO: 15.14 K/UL — HIGH (ref 3.8–10.5)

## 2021-10-24 PROCEDURE — 71045 X-RAY EXAM CHEST 1 VIEW: CPT | Mod: 26

## 2021-10-24 PROCEDURE — 74018 RADEX ABDOMEN 1 VIEW: CPT | Mod: 26

## 2021-10-24 RX ORDER — PANTOPRAZOLE SODIUM 20 MG/1
40 TABLET, DELAYED RELEASE ORAL DAILY
Refills: 0 | Status: DISCONTINUED | OUTPATIENT
Start: 2021-10-24 | End: 2021-10-26

## 2021-10-24 RX ORDER — ONDANSETRON 8 MG/1
4 TABLET, FILM COATED ORAL ONCE
Refills: 0 | Status: COMPLETED | OUTPATIENT
Start: 2021-10-24 | End: 2021-10-24

## 2021-10-24 RX ORDER — SODIUM CHLORIDE 9 MG/ML
500 INJECTION, SOLUTION INTRAVENOUS ONCE
Refills: 0 | Status: COMPLETED | OUTPATIENT
Start: 2021-10-24 | End: 2021-10-24

## 2021-10-24 RX ORDER — SODIUM CHLORIDE 9 MG/ML
1000 INJECTION, SOLUTION INTRAVENOUS
Refills: 0 | Status: DISCONTINUED | OUTPATIENT
Start: 2021-10-24 | End: 2021-10-25

## 2021-10-24 RX ORDER — LOPERAMIDE HCL 2 MG
4 TABLET ORAL EVERY 6 HOURS
Refills: 0 | Status: DISCONTINUED | OUTPATIENT
Start: 2021-10-24 | End: 2021-10-24

## 2021-10-24 RX ORDER — SODIUM CHLORIDE 9 MG/ML
500 INJECTION INTRAMUSCULAR; INTRAVENOUS; SUBCUTANEOUS ONCE
Refills: 0 | Status: COMPLETED | OUTPATIENT
Start: 2021-10-24 | End: 2021-10-24

## 2021-10-24 RX ORDER — LOPERAMIDE HCL 2 MG
2 TABLET ORAL ONCE
Refills: 0 | Status: COMPLETED | OUTPATIENT
Start: 2021-10-24 | End: 2021-10-24

## 2021-10-24 RX ADMIN — Medication 4 MILLIGRAM(S): at 17:31

## 2021-10-24 RX ADMIN — SODIUM CHLORIDE 75 MILLILITER(S): 9 INJECTION, SOLUTION INTRAVENOUS at 21:51

## 2021-10-24 RX ADMIN — SODIUM CHLORIDE 1000 MILLILITER(S): 9 INJECTION, SOLUTION INTRAVENOUS at 17:27

## 2021-10-24 RX ADMIN — Medication 81 MILLIGRAM(S): at 13:59

## 2021-10-24 RX ADMIN — BUDESONIDE AND FORMOTEROL FUMARATE DIHYDRATE 2 PUFF(S): 160; 4.5 AEROSOL RESPIRATORY (INHALATION) at 17:27

## 2021-10-24 RX ADMIN — BUDESONIDE AND FORMOTEROL FUMARATE DIHYDRATE 2 PUFF(S): 160; 4.5 AEROSOL RESPIRATORY (INHALATION) at 05:24

## 2021-10-24 RX ADMIN — Medication 1 GRAM(S): at 05:20

## 2021-10-24 RX ADMIN — HEPARIN SODIUM 5000 UNIT(S): 5000 INJECTION INTRAVENOUS; SUBCUTANEOUS at 05:22

## 2021-10-24 RX ADMIN — HEPARIN SODIUM 5000 UNIT(S): 5000 INJECTION INTRAVENOUS; SUBCUTANEOUS at 14:00

## 2021-10-24 RX ADMIN — SODIUM CHLORIDE 500 MILLILITER(S): 9 INJECTION INTRAMUSCULAR; INTRAVENOUS; SUBCUTANEOUS at 05:33

## 2021-10-24 RX ADMIN — SIMETHICONE 80 MILLIGRAM(S): 80 TABLET, CHEWABLE ORAL at 05:24

## 2021-10-24 RX ADMIN — Medication 1 GRAM(S): at 17:31

## 2021-10-24 RX ADMIN — PANTOPRAZOLE SODIUM 40 MILLIGRAM(S): 20 TABLET, DELAYED RELEASE ORAL at 05:21

## 2021-10-24 RX ADMIN — ONDANSETRON 4 MILLIGRAM(S): 8 TABLET, FILM COATED ORAL at 15:42

## 2021-10-24 RX ADMIN — ONDANSETRON 4 MILLIGRAM(S): 8 TABLET, FILM COATED ORAL at 09:06

## 2021-10-24 RX ADMIN — SODIUM CHLORIDE 75 MILLILITER(S): 9 INJECTION, SOLUTION INTRAVENOUS at 17:27

## 2021-10-24 RX ADMIN — Medication 2 MILLIGRAM(S): at 05:21

## 2021-10-24 RX ADMIN — Medication 4 MILLIGRAM(S): at 14:00

## 2021-10-24 RX ADMIN — AMLODIPINE BESYLATE 5 MILLIGRAM(S): 2.5 TABLET ORAL at 05:24

## 2021-10-24 RX ADMIN — Medication 1 GRAM(S): at 13:59

## 2021-10-24 RX ADMIN — HEPARIN SODIUM 5000 UNIT(S): 5000 INJECTION INTRAVENOUS; SUBCUTANEOUS at 21:53

## 2021-10-24 NOTE — PROGRESS NOTE ADULT - ASSESSMENT
The patient is a 66y Male who is now POD3 s/p laparoscopic total abdominal colectomy and ileoproctostomy, without proctectomy. Now recovering well on floor.     Plan:             x9002  [facility] Surgery   The patient is a 66y Male who is now POD2 s/p laparoscopic total abdominal colectomy and ileoproctostomy, without proctectomy. Now recovering well on floor.     Plan:  - Immodium 4mg Q6, up from 2mg Q6 i/s/o diarrhea   - If febrile, consider CT w rectal contrast   - Pain control as needed.   - DVT ppx  - OOB and ambulating as tolerated  - F/u AM labs  - Monitor I&O's   - DIET: LRD  - Monitor u/o      x9002  [facility] Surgery

## 2021-10-24 NOTE — PROGRESS NOTE ADULT - SUBJECTIVE AND OBJECTIVE BOX
24h Events:  No acute events overnight.    Subjective:   Patient seen at bedside this AM.      Objective:  Vital Signs  T(C): 37.2 (10-22 @ 20:43), Max: 37.2 (10-22 @ 01:57)  HR: 72 (10-22 @ 20:43) (53 - 77)  BP: 128/71 (10-22 @ 20:43) (100/61 - 128/71)  RR: 18 (10-22 @ 20:43) (18 - 18)  SpO2: 95% (10-22 @ 20:43) (93% - 95%)  10-21-21 @ 07:01  -  10-22-21 @ 07:00  --------------------------------------------------------  IN:  Total IN: 0 mL    OUT:    Indwelling Catheter - Urethral (mL): 1035 mL  Total OUT: 1035 mL    Total NET: -1035 mL      10-22-21 @ 07:01  -  10-23-21 @ 00:07  --------------------------------------------------------  IN:  Total IN: 0 mL    OUT:    Indwelling Catheter - Urethral (mL): 450 mL    Voided (mL): 200 mL  Total OUT: 650 mL    Total NET: -650 mL          Physical Exam:  GEN:   RESP:   ABD:   EXTR:   NEURO:     Labs:                        14.3   14.16 )-----------( 221      ( 22 Oct 2021 07:03 )             45.4   10-22    141  |  106  |  16  ----------------------------<  100<H>  3.6   |  20<L>  |  1.10    Ca    8.3<L>      22 Oct 2021 07:03  Phos  3.1     10-22  Mg     2.1     10-22      CAPILLARY BLOOD GLUCOSE          Imaging:     24h Events:  Multiple episodes of diarrhea o/n. Patient started on immodium.     Subjective:   Patient seen at bedside this AM. Reports "20 episodes of diarrhea", with slight improvement from immodium. Patient also reports mild nausea that began this AM. Denies emesis. Denies fever/chills/cp/sob.     Objective:  Vital Signs  T(C): 37.2 (10-22 @ 20:43), Max: 37.2 (10-22 @ 01:57)  HR: 72 (10-22 @ 20:43) (53 - 77)  BP: 128/71 (10-22 @ 20:43) (100/61 - 128/71)  RR: 18 (10-22 @ 20:43) (18 - 18)  SpO2: 95% (10-22 @ 20:43) (93% - 95%)  10-21-21 @ 07:01  -  10-22-21 @ 07:00  --------------------------------------------------------  IN:  Total IN: 0 mL    OUT:    Indwelling Catheter - Urethral (mL): 1035 mL  Total OUT: 1035 mL    Total NET: -1035 mL      10-22-21 @ 07:01  -  10-23-21 @ 00:07  --------------------------------------------------------  IN:  Total IN: 0 mL    OUT:    Indwelling Catheter - Urethral (mL): 450 mL    Voided (mL): 200 mL  Total OUT: 650 mL    Total NET: -650 mL          Physical Exam:  GEN: NAD  RESP: no increased work of breathing  ABD: soft, mildly distended, mildly ttp in RUQ/LUQ, incision cdi, dressing replaced   NEURO: awake and alert     Labs:                        14.3   14.16 )-----------( 221      ( 22 Oct 2021 07:03 )             45.4   10-22    141  |  106  |  16  ----------------------------<  100<H>  3.6   |  20<L>  |  1.10    Ca    8.3<L>      22 Oct 2021 07:03  Phos  3.1     10-22  Mg     2.1     10-22      CAPILLARY BLOOD GLUCOSE          Imaging:

## 2021-10-24 NOTE — PROVIDER CONTACT NOTE (MEDICATION) - ASSESSMENT
pt alert and oriented. pt on low fiber diet. pt barely ate and felt super nauseous and sweating. vss temp 98.1 HR 98 /85 o2 96%

## 2021-10-25 LAB
ANION GAP SERPL CALC-SCNC: 22 MMOL/L — HIGH (ref 5–17)
BUN SERPL-MCNC: 40 MG/DL — HIGH (ref 7–23)
CALCIUM SERPL-MCNC: 9.5 MG/DL — SIGNIFICANT CHANGE UP (ref 8.4–10.5)
CHLORIDE SERPL-SCNC: 101 MMOL/L — SIGNIFICANT CHANGE UP (ref 96–108)
CO2 SERPL-SCNC: 18 MMOL/L — LOW (ref 22–31)
CREAT SERPL-MCNC: 1.18 MG/DL — SIGNIFICANT CHANGE UP (ref 0.5–1.3)
GLUCOSE SERPL-MCNC: 94 MG/DL — SIGNIFICANT CHANGE UP (ref 70–99)
HCT VFR BLD CALC: 47.4 % — SIGNIFICANT CHANGE UP (ref 39–50)
HGB BLD-MCNC: 15.6 G/DL — SIGNIFICANT CHANGE UP (ref 13–17)
MAGNESIUM SERPL-MCNC: 2.2 MG/DL — SIGNIFICANT CHANGE UP (ref 1.6–2.6)
MCHC RBC-ENTMCNC: 29.1 PG — SIGNIFICANT CHANGE UP (ref 27–34)
MCHC RBC-ENTMCNC: 32.9 GM/DL — SIGNIFICANT CHANGE UP (ref 32–36)
MCV RBC AUTO: 88.4 FL — SIGNIFICANT CHANGE UP (ref 80–100)
NRBC # BLD: 0 /100 WBCS — SIGNIFICANT CHANGE UP (ref 0–0)
PHOSPHATE SERPL-MCNC: 3.6 MG/DL — SIGNIFICANT CHANGE UP (ref 2.5–4.5)
PLATELET # BLD AUTO: 270 K/UL — SIGNIFICANT CHANGE UP (ref 150–400)
POTASSIUM SERPL-MCNC: 3.8 MMOL/L — SIGNIFICANT CHANGE UP (ref 3.5–5.3)
POTASSIUM SERPL-SCNC: 3.8 MMOL/L — SIGNIFICANT CHANGE UP (ref 3.5–5.3)
RBC # BLD: 5.36 M/UL — SIGNIFICANT CHANGE UP (ref 4.2–5.8)
RBC # FLD: 13.1 % — SIGNIFICANT CHANGE UP (ref 10.3–14.5)
SODIUM SERPL-SCNC: 141 MMOL/L — SIGNIFICANT CHANGE UP (ref 135–145)
WBC # BLD: 13.21 K/UL — HIGH (ref 3.8–10.5)
WBC # FLD AUTO: 13.21 K/UL — HIGH (ref 3.8–10.5)

## 2021-10-25 PROCEDURE — 71045 X-RAY EXAM CHEST 1 VIEW: CPT | Mod: 26

## 2021-10-25 RX ORDER — SODIUM CHLORIDE 9 MG/ML
1000 INJECTION, SOLUTION INTRAVENOUS
Refills: 0 | Status: DISCONTINUED | OUTPATIENT
Start: 2021-10-25 | End: 2021-10-26

## 2021-10-25 RX ORDER — POTASSIUM CHLORIDE 20 MEQ
10 PACKET (EA) ORAL
Refills: 0 | Status: COMPLETED | OUTPATIENT
Start: 2021-10-25 | End: 2021-10-25

## 2021-10-25 RX ORDER — ACETAMINOPHEN 500 MG
1000 TABLET ORAL ONCE
Refills: 0 | Status: COMPLETED | OUTPATIENT
Start: 2021-10-25 | End: 2021-10-25

## 2021-10-25 RX ORDER — SODIUM CHLORIDE 9 MG/ML
500 INJECTION, SOLUTION INTRAVENOUS ONCE
Refills: 0 | Status: COMPLETED | OUTPATIENT
Start: 2021-10-25 | End: 2021-10-25

## 2021-10-25 RX ADMIN — HEPARIN SODIUM 5000 UNIT(S): 5000 INJECTION INTRAVENOUS; SUBCUTANEOUS at 21:25

## 2021-10-25 RX ADMIN — Medication 100 MILLIEQUIVALENT(S): at 16:11

## 2021-10-25 RX ADMIN — Medication 400 MILLIGRAM(S): at 01:38

## 2021-10-25 RX ADMIN — Medication 1000 MILLIGRAM(S): at 02:08

## 2021-10-25 RX ADMIN — Medication 100 MILLIEQUIVALENT(S): at 21:27

## 2021-10-25 RX ADMIN — SODIUM CHLORIDE 125 MILLILITER(S): 9 INJECTION, SOLUTION INTRAVENOUS at 04:59

## 2021-10-25 RX ADMIN — Medication 100 MILLIEQUIVALENT(S): at 13:12

## 2021-10-25 RX ADMIN — HEPARIN SODIUM 5000 UNIT(S): 5000 INJECTION INTRAVENOUS; SUBCUTANEOUS at 13:09

## 2021-10-25 RX ADMIN — PANTOPRAZOLE SODIUM 40 MILLIGRAM(S): 20 TABLET, DELAYED RELEASE ORAL at 13:09

## 2021-10-25 RX ADMIN — HEPARIN SODIUM 5000 UNIT(S): 5000 INJECTION INTRAVENOUS; SUBCUTANEOUS at 04:57

## 2021-10-25 RX ADMIN — BUDESONIDE AND FORMOTEROL FUMARATE DIHYDRATE 2 PUFF(S): 160; 4.5 AEROSOL RESPIRATORY (INHALATION) at 05:18

## 2021-10-25 RX ADMIN — SODIUM CHLORIDE 500 MILLILITER(S): 9 INJECTION, SOLUTION INTRAVENOUS at 08:01

## 2021-10-25 NOTE — PROGRESS NOTE ADULT - SUBJECTIVE AND OBJECTIVE BOX
24h Events:  No acute events overnight.    Subjective:   Patient seen at bedside this AM. Denies n/v, SOB, CP, fevers/chills.    Objective:  Vital Signs  T(C): 36.9 (10-24 @ 20:41), Max: 37 (10-24 @ 00:20)  HR: 95 (10-24 @ 20:41) (82 - 98)  BP: 136/87 (10-24 @ 20:41) (108/72 - 136/87)  RR: 18 (10-24 @ 20:41) (18 - 18)  SpO2: 95% (10-24 @ 20:41) (94% - 96%)  10-23-21 @ 07:01  -  10-24-21 @ 07:00  --------------------------------------------------------  IN:  Total IN: 0 mL    OUT:    Voided (mL): 975 mL  Total OUT: 975 mL    Total NET: -975 mL      10-24-21 @ 07:01  -  10-25-21 @ 00:11  --------------------------------------------------------  IN:  Total IN: 0 mL    OUT:    Nasogastric/Oral tube (mL): 800 mL    Voided (mL): 650 mL  Total OUT: 1450 mL    Total NET: -1450 mL          Physical Exam:  GEN:   RESP:   ABD:   EXTR:   NEURO:     Labs:                        16.9   15.14 )-----------( 284      ( 24 Oct 2021 07:32 )             51.1   10-24    140  |  104  |  34<H>  ----------------------------<  130<H>  4.1   |  15<L>  |  1.26    Ca    9.8      24 Oct 2021 07:26  Phos  4.3     10-24  Mg     2.3     10-24      CAPILLARY BLOOD GLUCOSE          Imaging:     24h Events:  NGT placed d/t emesis and ileus on ABD XR. 800cc output o/n.      Subjective:   Patient seen at bedside this AM. Pt tolerating NGT. Denies n/v, SOB, CP, fevers/chills. Counseled patient that NGT will remain while output is still elevated.     Objective:  Vital Signs  T(C): 36.9 (10-24 @ 20:41), Max: 37 (10-24 @ 00:20)  HR: 95 (10-24 @ 20:41) (82 - 98)  BP: 136/87 (10-24 @ 20:41) (108/72 - 136/87)  RR: 18 (10-24 @ 20:41) (18 - 18)  SpO2: 95% (10-24 @ 20:41) (94% - 96%)  10-23-21 @ 07:01  -  10-24-21 @ 07:00  --------------------------------------------------------  IN:  Total IN: 0 mL    OUT:    Voided (mL): 975 mL  Total OUT: 975 mL    Total NET: -975 mL      10-24-21 @ 07:01  -  10-25-21 @ 00:11  --------------------------------------------------------  IN:  Total IN: 0 mL    OUT:    Nasogastric/Oral tube (mL): 800 mL    Voided (mL): 650 mL  Total OUT: 1450 mL    Total NET: -1450 mL          Physical Exam:  GEN: NAD, lying in bed comfortably  RESP: no increased work of breathing  ABD: soft, distended, ttp diffusely improved from yesterday. Incision CDI   NEURO: awake and alert     Labs:                        16.9   15.14 )-----------( 284      ( 24 Oct 2021 07:32 )             51.1   10-24    140  |  104  |  34<H>  ----------------------------<  130<H>  4.1   |  15<L>  |  1.26    Ca    9.8      24 Oct 2021 07:26  Phos  4.3     10-24  Mg     2.3     10-24      CAPILLARY BLOOD GLUCOSE          Imaging:

## 2021-10-25 NOTE — PROGRESS NOTE ADULT - ASSESSMENT
The patient is a 66y Male who is now POD2 s/p laparoscopic total abdominal colectomy and ileoproctostomy, without proctectomy. Now recovering well on floor.     Plan:  - Immodium 4mg Q6, up from 2mg Q6 i/s/o diarrhea   - If febrile, consider CT w rectal contrast   - Pain control as needed.   - DVT ppx  - OOB and ambulating as tolerated  - F/u AM labs  - Monitor I&O's   - DIET: LRD  - Monitor u/o      x9002  Red Surgery   The patient is a 66y Male who is now POD4 s/p laparoscopic total abdominal colectomy and ileoproctostomy, without proctectomy. Recovering on the floor, course complicated by ileus, requiring NGT 10/24.     Plan:  - Immodium dc'd  - NGT placed 10/24 2/2 ileus on ABD XR, monitor output   - NPO/IVF   - If febrile, consider CT w rectal contrast   - Pain control as needed.   - DVT ppx  - OOB and ambulating as tolerated  - F/u AM labs  - Monitor I&O's       x9002  Red Surgery

## 2021-10-25 NOTE — PROVIDER CONTACT NOTE (OTHER) - ASSESSMENT
Pt complaining of nausea, heartburn & abdominal pain. Pt abdomen distended. Pt reports having multiple liquid bowel movements during day. Unable to tolerate PO intake at this time. Has received oxycodone for pain with no relief.
. All other VSS. Pt NPO w/ NGT to LCWS - NGT output approx 1500 cc so far this shift. IVF infusing at 75 cc per hour. Pt has not voided since 1700 on 10/24

## 2021-10-26 ENCOUNTER — TRANSCRIPTION ENCOUNTER (OUTPATIENT)
Age: 66
End: 2021-10-26

## 2021-10-26 VITALS
RESPIRATION RATE: 18 BRPM | TEMPERATURE: 98 F | SYSTOLIC BLOOD PRESSURE: 127 MMHG | OXYGEN SATURATION: 96 % | HEART RATE: 96 BPM | DIASTOLIC BLOOD PRESSURE: 77 MMHG

## 2021-10-26 LAB
ANION GAP SERPL CALC-SCNC: 17 MMOL/L — SIGNIFICANT CHANGE UP (ref 5–17)
BUN SERPL-MCNC: 30 MG/DL — HIGH (ref 7–23)
CALCIUM SERPL-MCNC: 9.1 MG/DL — SIGNIFICANT CHANGE UP (ref 8.4–10.5)
CHLORIDE SERPL-SCNC: 104 MMOL/L — SIGNIFICANT CHANGE UP (ref 96–108)
CO2 SERPL-SCNC: 20 MMOL/L — LOW (ref 22–31)
CREAT SERPL-MCNC: 1.02 MG/DL — SIGNIFICANT CHANGE UP (ref 0.5–1.3)
GLUCOSE SERPL-MCNC: 110 MG/DL — HIGH (ref 70–99)
HCT VFR BLD CALC: 45.6 % — SIGNIFICANT CHANGE UP (ref 39–50)
HGB BLD-MCNC: 15.3 G/DL — SIGNIFICANT CHANGE UP (ref 13–17)
MAGNESIUM SERPL-MCNC: 2 MG/DL — SIGNIFICANT CHANGE UP (ref 1.6–2.6)
MCHC RBC-ENTMCNC: 29.6 PG — SIGNIFICANT CHANGE UP (ref 27–34)
MCHC RBC-ENTMCNC: 33.6 GM/DL — SIGNIFICANT CHANGE UP (ref 32–36)
MCV RBC AUTO: 88.2 FL — SIGNIFICANT CHANGE UP (ref 80–100)
NRBC # BLD: 0 /100 WBCS — SIGNIFICANT CHANGE UP (ref 0–0)
PHOSPHATE SERPL-MCNC: 3 MG/DL — SIGNIFICANT CHANGE UP (ref 2.5–4.5)
PLATELET # BLD AUTO: 265 K/UL — SIGNIFICANT CHANGE UP (ref 150–400)
POTASSIUM SERPL-MCNC: 3.9 MMOL/L — SIGNIFICANT CHANGE UP (ref 3.5–5.3)
POTASSIUM SERPL-SCNC: 3.9 MMOL/L — SIGNIFICANT CHANGE UP (ref 3.5–5.3)
RBC # BLD: 5.17 M/UL — SIGNIFICANT CHANGE UP (ref 4.2–5.8)
RBC # FLD: 12.9 % — SIGNIFICANT CHANGE UP (ref 10.3–14.5)
SODIUM SERPL-SCNC: 141 MMOL/L — SIGNIFICANT CHANGE UP (ref 135–145)
WBC # BLD: 12.61 K/UL — HIGH (ref 3.8–10.5)
WBC # FLD AUTO: 12.61 K/UL — HIGH (ref 3.8–10.5)

## 2021-10-26 PROCEDURE — C9399: CPT

## 2021-10-26 PROCEDURE — 82962 GLUCOSE BLOOD TEST: CPT

## 2021-10-26 PROCEDURE — C1769: CPT

## 2021-10-26 PROCEDURE — 71045 X-RAY EXAM CHEST 1 VIEW: CPT

## 2021-10-26 PROCEDURE — 83735 ASSAY OF MAGNESIUM: CPT

## 2021-10-26 PROCEDURE — 80048 BASIC METABOLIC PNL TOTAL CA: CPT

## 2021-10-26 PROCEDURE — 94640 AIRWAY INHALATION TREATMENT: CPT

## 2021-10-26 PROCEDURE — 88309 TISSUE EXAM BY PATHOLOGIST: CPT

## 2021-10-26 PROCEDURE — 84100 ASSAY OF PHOSPHORUS: CPT

## 2021-10-26 PROCEDURE — 74018 RADEX ABDOMEN 1 VIEW: CPT

## 2021-10-26 PROCEDURE — C9803: CPT

## 2021-10-26 PROCEDURE — 85027 COMPLETE CBC AUTOMATED: CPT

## 2021-10-26 PROCEDURE — C1758: CPT

## 2021-10-26 PROCEDURE — C1889: CPT

## 2021-10-26 PROCEDURE — 36415 COLL VENOUS BLD VENIPUNCTURE: CPT

## 2021-10-26 RX ORDER — APIXABAN 2.5 MG/1
1 TABLET, FILM COATED ORAL
Qty: 60 | Refills: 0
Start: 2021-10-26 | End: 2021-11-24

## 2021-10-26 RX ORDER — DEXTROSE MONOHYDRATE, SODIUM CHLORIDE, AND POTASSIUM CHLORIDE 50; .745; 4.5 G/1000ML; G/1000ML; G/1000ML
1000 INJECTION, SOLUTION INTRAVENOUS
Refills: 0 | Status: DISCONTINUED | OUTPATIENT
Start: 2021-10-26 | End: 2021-10-26

## 2021-10-26 RX ORDER — ASPIRIN/CALCIUM CARB/MAGNESIUM 324 MG
1 TABLET ORAL
Qty: 0 | Refills: 0 | DISCHARGE
Start: 2021-10-26

## 2021-10-26 RX ORDER — ASPIRIN/CALCIUM CARB/MAGNESIUM 324 MG
1 TABLET ORAL
Qty: 0 | Refills: 0 | DISCHARGE

## 2021-10-26 RX ADMIN — AMLODIPINE BESYLATE 5 MILLIGRAM(S): 2.5 TABLET ORAL at 05:29

## 2021-10-26 RX ADMIN — BUDESONIDE AND FORMOTEROL FUMARATE DIHYDRATE 2 PUFF(S): 160; 4.5 AEROSOL RESPIRATORY (INHALATION) at 05:35

## 2021-10-26 RX ADMIN — HEPARIN SODIUM 5000 UNIT(S): 5000 INJECTION INTRAVENOUS; SUBCUTANEOUS at 05:34

## 2021-10-26 NOTE — PROGRESS NOTE ADULT - ASSESSMENT
The patient is a 66y Male who is now POD4 s/p laparoscopic total abdominal colectomy and ileoproctostomy, without proctectomy. Recovering on the floor, course complicated by ileus, requiring NGT 10/24.     Plan:  - NGT placed 10/24 2/2 ileus on ABD XR, monitor output   - NPO/IVF   - If febrile, consider CT w rectal contrast   - Pain control as needed.   - DVT ppx  - OOB and ambulating as tolerated  - F/u AM labs  - Monitor I&O's       x9002  Red Surgery

## 2021-10-26 NOTE — DISCHARGE NOTE NURSING/CASE MANAGEMENT/SOCIAL WORK - PATIENT PORTAL LINK FT
You can access the FollowMyHealth Patient Portal offered by [facility] by registering at the following website: [email or web address]. By joining [employer]’s FollowMyHealth portal, you will also be able to view your health information using other applications (apps) compatible with our system.

## 2021-10-26 NOTE — PROGRESS NOTE ADULT - SUBJECTIVE AND OBJECTIVE BOX
24h Events:  No acute events overnight.    Subjective:   Patient seen at bedside this AM. Denies n/v, SOB, CP, fevers/chills.    Objective:  Vital Signs  T(C): 37.4 (10-26 @ 00:45), Max: 37.4 (10-26 @ 00:45)  HR: 89 (10-26 @ 00:45) (88 - 95)  BP: 126/76 (10-26 @ 00:45) (123/75 - 146/88)  RR: 18 (10-26 @ 00:45) (18 - 18)  SpO2: 95% (10-26 @ 00:45) (94% - 95%)  10-24-21 @ 07:01  -  10-25-21 @ 07:00  --------------------------------------------------------  IN:  Total IN: 0 mL    OUT:    Nasogastric/Oral tube (mL): 1700 mL    Voided (mL): 875 mL  Total OUT: 2575 mL    Total NET: -2575 mL      10-25-21 @ 07:01  -  10-26-21 @ 04:41  --------------------------------------------------------  IN:  Total IN: 0 mL    OUT:    Nasogastric/Oral tube (mL): 700 mL    Voided (mL): 700 mL  Total OUT: 1400 mL    Total NET: -1400 mL          Physical Exam:  GEN:   RESP:   ABD:   EXTR:   NEURO:     Labs:                        15.6   13.21 )-----------( 270      ( 25 Oct 2021 07:24 )             47.4   10-25    141  |  101  |  40<H>  ----------------------------<  94  3.8   |  18<L>  |  1.18    Ca    9.5      25 Oct 2021 07:22  Phos  3.6     10-25  Mg     2.2     10-25      CAPILLARY BLOOD GLUCOSE          Imaging:     24h Events:  No acute events overnight.    Subjective:   Patient seen at bedside this AM. Patient reports passing gas and feeling better.  Denies n/v, SOB, CP, fevers/chills.    Objective:  Vital Signs  T(C): 37.4 (10-26 @ 00:45), Max: 37.4 (10-26 @ 00:45)  HR: 89 (10-26 @ 00:45) (88 - 95)  BP: 126/76 (10-26 @ 00:45) (123/75 - 146/88)  RR: 18 (10-26 @ 00:45) (18 - 18)  SpO2: 95% (10-26 @ 00:45) (94% - 95%)  10-24-21 @ 07:01  -  10-25-21 @ 07:00  --------------------------------------------------------  IN:  Total IN: 0 mL    OUT:    Nasogastric/Oral tube (mL): 1700 mL    Voided (mL): 875 mL  Total OUT: 2575 mL  Total NET: -2575 mL      10-25-21 @ 07:01  -  10-26-21 @ 04:41  --------------------------------------------------------  IN:  Total IN: 0 mL    OUT:    Nasogastric/Oral tube (mL): 700 mL    Voided (mL): 700 mL  Total OUT: 1400 mL  Total NET: -1400 mL      Physical Exam:  GEN: NAD, lying in bed comfortably, NGT present  RESP: no increased work of breathing  ABD: soft, distended, mildly ttp diffusely improved from yesterday. Incision CDI   NEURO: awake and alert     Labs:                        15.6   13.21 )-----------( 270      ( 25 Oct 2021 07:24 )             47.4   10-25    141  |  101  |  40<H>  ----------------------------<  94  3.8   |  18<L>  |  1.18    Ca    9.5      25 Oct 2021 07:22  Phos  3.6     10-25  Mg     2.2     10-25      CAPILLARY BLOOD GLUCOSE    Imaging:

## 2021-11-04 ENCOUNTER — NON-APPOINTMENT (OUTPATIENT)
Age: 66
End: 2021-11-04

## 2021-11-04 LAB — SURGICAL PATHOLOGY STUDY: SIGNIFICANT CHANGE UP

## 2021-11-05 ENCOUNTER — APPOINTMENT (OUTPATIENT)
Dept: COLORECTAL SURGERY | Facility: CLINIC | Age: 66
End: 2021-11-05
Payer: MEDICARE

## 2021-11-05 PROCEDURE — 99024 POSTOP FOLLOW-UP VISIT: CPT

## 2021-11-06 NOTE — HISTORY OF PRESENT ILLNESS
[FreeTextEntry1] : 66-year-old gentleman who presents for his first postoperative visit.\par \par Patient underwent a laparoscopic-assisted total abdominal colectomy with ileo-distal sigmoid anastomosis for a polyposis syndrome a few weeks ago. Patient experienced a postoperative ileus and left the hospital rather rapidly thereafter. He was readmitted to an outside hospital feeling poorly and was found to be dehydrated and having multiple bowel movements. After rehydration he felt quite well and was discharged.\par \par Currently the patient looks and feels well. The frequency of his bowel movements has significantly decreased. He is actually experiencing some semi-formed less frequent movements. He denies rectal bleeding or temperature elevation. He has no significant incisional pain. There is some slight drainage from his specimen extraction site and he does complain of some numbness of his left anterior thigh and some discomfort with urination.\par \par Physical examination reveals a soft, nontender, nondistended abdomen. The trocar sites are healing nicely and staples were removed. The specimen extraction site was probed and some fat necrosis was drained but no purulence. One half of the staples was removed from the specimen extraction site.\par \par I told the patient if numbness continues I would refer him to neurology. I want him to obtain a urinalysis and urine culture to be sure he does not have a urinary tract infection. I will re-evaluate him here in 2 weeks. For now I want him to remain on a low-residue diet and avoid strenuous activity.

## 2021-11-10 ENCOUNTER — APPOINTMENT (OUTPATIENT)
Dept: COLORECTAL SURGERY | Facility: CLINIC | Age: 66
End: 2021-11-10
Payer: MEDICARE

## 2021-11-10 ENCOUNTER — OUTPATIENT (OUTPATIENT)
Dept: OUTPATIENT SERVICES | Facility: HOSPITAL | Age: 66
LOS: 1 days | End: 2021-11-10
Payer: MEDICARE

## 2021-11-10 ENCOUNTER — APPOINTMENT (OUTPATIENT)
Dept: CT IMAGING | Facility: CLINIC | Age: 66
End: 2021-11-10
Payer: MEDICARE

## 2021-11-10 ENCOUNTER — RESULT REVIEW (OUTPATIENT)
Age: 66
End: 2021-11-10

## 2021-11-10 DIAGNOSIS — Z98.890 OTHER SPECIFIED POSTPROCEDURAL STATES: Chronic | ICD-10-CM

## 2021-11-10 DIAGNOSIS — Z00.8 ENCOUNTER FOR OTHER GENERAL EXAMINATION: ICD-10-CM

## 2021-11-10 PROCEDURE — 74177 CT ABD & PELVIS W/CONTRAST: CPT | Mod: 26,MH

## 2021-11-10 PROCEDURE — 74177 CT ABD & PELVIS W/CONTRAST: CPT | Mod: MH

## 2021-11-10 PROCEDURE — 99024 POSTOP FOLLOW-UP VISIT: CPT

## 2021-11-10 NOTE — HISTORY OF PRESENT ILLNESS
[FreeTextEntry1] : Pleasant 66-year-old gentleman who presents for follow up visit.\par \par He is approximately 3 weeks status post laparoscopic-assisted total abdominal colectomy with ileal distal sigmoid anastomosis for a polyposis syndrome. I saw him last week and he was doing well other than having some wound drainage. Subsequently he has developed some right lower quadrant/right groin discomfort. He states that his bowel function is actually improving with decreasing frequency and increase in consistency. He denies rectal bleeding or temperature elevation.\par \par Physical examination reveals that his lower midline specimen extraction site is healing nicely. Any remaining staples were removed. There is some slight tenderness in the right lower quadrant/groin region.\par \par I strongly doubt that this is directly related to his colorectal surgery. To be cautious, we will obtain a CAT scan of the abdomen and pelvis. Perhaps he has a recurrent right inguinal hernia.

## 2021-11-19 ENCOUNTER — APPOINTMENT (OUTPATIENT)
Dept: COLORECTAL SURGERY | Facility: CLINIC | Age: 66
End: 2021-11-19

## 2021-12-15 ENCOUNTER — APPOINTMENT (OUTPATIENT)
Dept: PEDIATRIC MEDICAL GENETICS | Facility: CLINIC | Age: 66
End: 2021-12-15

## 2022-01-28 ENCOUNTER — APPOINTMENT (OUTPATIENT)
Dept: PEDIATRIC MEDICAL GENETICS | Facility: CLINIC | Age: 67
End: 2022-01-28

## 2022-01-28 PROCEDURE — ZZZZZ: CPT

## 2022-02-01 ENCOUNTER — APPOINTMENT (OUTPATIENT)
Dept: PEDIATRIC MEDICAL GENETICS | Facility: CLINIC | Age: 67
End: 2022-02-01

## 2022-07-21 NOTE — DISCHARGE NOTE PROVIDER - NSDCCPGOAL_GEN_ALL_CORE_FT
Multiple views of the left main, left coronary artery, LAD and circumflex artery obtained using hand injection. To get better and follow your care plan as instructed.

## 2022-09-13 NOTE — PROVIDER CONTACT NOTE (OTHER) - ACTION/TREATMENT ORDERED:
Last filled 7-20-22  Last seen 7-6-22  Next appointment 10-5-22
MD to come to bedside to assess patient. Awaiting further orders.
Per MD will increase rate of IV fluids - continue to monitor HR & urine output. No further action required at this time.

## 2023-01-16 ENCOUNTER — NON-APPOINTMENT (OUTPATIENT)
Age: 68
End: 2023-01-16

## 2023-01-25 ENCOUNTER — APPOINTMENT (OUTPATIENT)
Dept: CARDIOTHORACIC SURGERY | Facility: CLINIC | Age: 68
End: 2023-01-25
Payer: MEDICARE

## 2023-01-25 VITALS
RESPIRATION RATE: 15 BRPM | TEMPERATURE: 98 F | OXYGEN SATURATION: 98 % | SYSTOLIC BLOOD PRESSURE: 130 MMHG | HEART RATE: 80 BPM | DIASTOLIC BLOOD PRESSURE: 84 MMHG

## 2023-01-25 DIAGNOSIS — I71.21 ANEURYSM OF THE ASCENDING AORTA, WITHOUT RUPTURE: ICD-10-CM

## 2023-01-25 DIAGNOSIS — I10 ESSENTIAL (PRIMARY) HYPERTENSION: ICD-10-CM

## 2023-01-25 PROCEDURE — 99214 OFFICE O/P EST MOD 30 MIN: CPT

## 2023-01-25 NOTE — HISTORY OF PRESENT ILLNESS
[FreeTextEntry1] : Darin is a 67 year old male who was referred by Dr. Weiss for surgical consultation for aortic root dilatation. PMH noted for CAD s/p PCI to mid to distal LAD in 2017, CODP, and aortic root dilatation. He reports on going chest pain that occurs at rest. He denies palpitations, BRYANT, or syncope. \par \par Cardiac catherization 1955\par LAD 30%, PCI is patent\par Cx segment is moderately tortuous. Minor irregularities\par RCA is large and moderately tortuous. \par Ascending aorta with moderate aortic dilatation. \par LVEF is normal \par \par TTE on 11/30/2022\par moderately dilated aortic root and mildly dilated ascending aorta

## 2023-01-25 NOTE — END OF VISIT
[FreeTextEntry3] : I, Dr. Kevin Parra, personally performed the evaluation and management (E/M) services for this new patient.  That E/M includes conducting the initial examination, assessing all conditions, and establishing the plan of care.  Today, Stacey Simpson NP was here to observe my evaluation and management services for this patient to be followed going forward.\par

## 2023-01-25 NOTE — ASSESSMENT
[FreeTextEntry1] : Darin is a 67 year old male who was referred by Dr. Weiss for surgical consultation for aortic root dilatation. PMH noted for CAD s/p PCI to mid to distal LAD in 2017, CODP, and aortic root dilatation. \par \par Cardiac catherization 1955\par LAD 30%, PCI is patent\par Cx segment is moderately tortuous. Minor irregularities\par RCA is large and moderately tortuous. \par Ascending aorta with moderate aortic dilatation. \par LVEF is normal \par \par TTE on 11/30/2022\par moderately dilated aortic root and mildly dilated ascending aorta\par \par I have reviewed the patient's medical records, diagnostic images during the time of this office consultation and have made the following recommendation. Review of the imaging aortic root enlargement however present imaging is underwhelming. Will require a CTA of chest with 3D imaging. Will require TTE when patient returns for office visit to discuss CTA and surgical plan. \par \par \par \par \par \par \par \par Plan:\par 1) CTA of chest 3D Reconstruction \par 2) TTE to evaluate aortic valve function \par 3) Cardiac catherization reviewed today

## 2023-01-25 NOTE — DATA REVIEWED
[FreeTextEntry1] : Cardiac catherization 1955\par Dr. Gillespie\par LAD 30%, PCI is patent\par Cx segment is moderately tortuous. Minor irregularities\par RCA is large and moderately tortuous. \par Ascending aorta with moderate aortic dilatation. \par LVEF is normal

## 2023-01-25 NOTE — REVIEW OF SYSTEMS
[Feeling Tired] : feeling tired [Chest Pain] : chest pain [SOB on Exertion] : shortness of breath during exertion [Dysuria] : dysuria [Joint Stiffness] : joint stiffness [Eye Pain] : no eye pain [Earache] : no earache [Abdominal Pain] : no abdominal pain [Skin Lesions] : no skin lesions [Dizziness] : no dizziness [Anxiety] : no anxiety [Proptosis] : no proptosis [Easy Bleeding] : no tendency for easy bleeding

## 2023-01-25 NOTE — PHYSICAL EXAM
[General Appearance - Alert] : alert [Sclera] : the sclera and conjunctiva were normal [Outer Ear] : the ears and nose were normal in appearance [Jugular Venous Distention Increased] : there was no jugular-venous distention [Respiration, Rhythm And Depth] : normal respiratory rhythm and effort [Auscultation Breath Sounds / Voice Sounds] : lungs were clear to auscultation bilaterally [Heart Rate And Rhythm] : heart rate was normal and rhythm regular [Examination Of The Chest] : the chest was normal in appearance [Breast Appearance] : normal in appearance [Bowel Sounds] : normal bowel sounds [Cervical Lymph Nodes Enlarged Posterior Bilaterally] : posterior cervical [Abdomen Soft] : soft [No CVA Tenderness] : no ~M costovertebral angle tenderness [Involuntary Movements] : no involuntary movements were seen [Skin Color & Pigmentation] : normal skin color and pigmentation [No Focal Deficits] : no focal deficits [Oriented To Time, Place, And Person] : oriented to person, place, and time [Right Carotid Bruit] : no bruit heard over the right carotid [Left Carotid Bruit] : no bruit heard over the left carotid

## 2023-01-26 ENCOUNTER — RESULT REVIEW (OUTPATIENT)
Age: 68
End: 2023-01-26

## 2023-02-01 ENCOUNTER — APPOINTMENT (OUTPATIENT)
Dept: CARDIOTHORACIC SURGERY | Facility: CLINIC | Age: 68
End: 2023-02-01
Payer: MEDICARE

## 2023-02-01 ENCOUNTER — OUTPATIENT (OUTPATIENT)
Dept: OUTPATIENT SERVICES | Facility: HOSPITAL | Age: 68
LOS: 1 days | End: 2023-02-01
Payer: MEDICARE

## 2023-02-01 VITALS
HEIGHT: 71 IN | SYSTOLIC BLOOD PRESSURE: 124 MMHG | HEART RATE: 71 BPM | TEMPERATURE: 98 F | BODY MASS INDEX: 25.2 KG/M2 | OXYGEN SATURATION: 99 % | RESPIRATION RATE: 14 BRPM | DIASTOLIC BLOOD PRESSURE: 85 MMHG | WEIGHT: 180 LBS

## 2023-02-01 DIAGNOSIS — I77.810 THORACIC AORTIC ECTASIA: ICD-10-CM

## 2023-02-01 DIAGNOSIS — Z98.890 OTHER SPECIFIED POSTPROCEDURAL STATES: Chronic | ICD-10-CM

## 2023-02-01 DIAGNOSIS — I35.0 NONRHEUMATIC AORTIC (VALVE) STENOSIS: ICD-10-CM

## 2023-02-01 PROCEDURE — 93306 TTE W/DOPPLER COMPLETE: CPT | Mod: 26

## 2023-02-01 PROCEDURE — 93306 TTE W/DOPPLER COMPLETE: CPT

## 2023-02-01 PROCEDURE — 99214 OFFICE O/P EST MOD 30 MIN: CPT

## 2023-02-01 RX ORDER — OMEPRAZOLE 40 MG/1
40 CAPSULE, DELAYED RELEASE ORAL TWICE DAILY
Qty: 60 | Refills: 3 | Status: COMPLETED | COMMUNITY
Start: 2017-10-24 | End: 2023-02-01

## 2023-02-02 PROBLEM — I77.810 AORTIC ROOT DILATATION: Status: ACTIVE | Noted: 2023-02-02

## 2023-02-02 NOTE — DATA REVIEWED
[FreeTextEntry1] : Cardiac catherization 1955\par Dr. Gillespie\par LAD 30%, PCI is patent\par Cx segment is moderately tortuous. Minor irregularities\par RCA is large and moderately tortuous. \par Ascending aorta with moderate aortic dilatation. \par LVEF is normal \par \par CTA of chest 1/26/2023\par Sinuses of Valsalva 4.6 cm\par Sinotubular junction 4.1 cm\par Ascending aorta at the level of the right pulmonary artery 4.4 cm\par Transverse arch 3.3 cm

## 2023-02-02 NOTE — PHYSICAL EXAM
[Sclera] : the sclera and conjunctiva were normal [Jugular Venous Distention Increased] : there was no jugular-venous distention [Respiration, Rhythm And Depth] : normal respiratory rhythm and effort [Heart Rate And Rhythm] : heart rate was normal and rhythm regular [Examination Of The Chest] : the chest was normal in appearance [Breast Appearance] : normal in appearance [Bowel Sounds] : normal bowel sounds [Abdomen Soft] : soft [Cervical Lymph Nodes Enlarged Posterior Bilaterally] : posterior cervical [Cervical Lymph Nodes Enlarged Anterior Bilaterally] : anterior cervical [No CVA Tenderness] : no ~M costovertebral angle tenderness [Involuntary Movements] : no involuntary movements were seen [Skin Color & Pigmentation] : normal skin color and pigmentation [No Focal Deficits] : no focal deficits [Oriented To Time, Place, And Person] : oriented to person, place, and time [Impaired Insight] : insight and judgment were intact [Right Carotid Bruit] : no bruit heard over the right carotid [Left Carotid Bruit] : no bruit heard over the left carotid

## 2023-02-02 NOTE — ASSESSMENT
[FreeTextEntry1] : Darin is a 67 year old male who was referred by Dr. Weiss for surgical consultation for aortic root dilatation. PMH noted for CAD s/p PCI to mid to distal LAD in 2017, CODP, and aortic root dilatation. He reports on going chest pain that occurs at rest. He denies palpitations, BRYANT, or syncope. \par \par Cardiac catherization 1/17/2023\par LAD 30%, PCI is patent\par Cx segment is moderately tortuous. Minor irregularities\par RCA is large and moderately tortuous. \par Ascending aorta with moderate aortic dilatation. \par LVEF is normal \par \par TTE on 11/30/2022\par moderately dilated aortic root and mildly dilated ascending aorta\par \par CTA of chest 1/26/2023\par Sinuses of Valsalva 4.6 cm\par Sinotubular junction 4.1 cm\par Ascending aorta at the level of the right pulmonary artery 4.4 cm\par Transverse arch 3.3 cm\par \par I have reviewed the patient's medical records, diagnostic images during the time of this office consultation and have made the following recommendation. Review of the imaging shows his aortic pathology has remained stable and does not require surgical intervention. All questions and concerns were addressed. \par \par Plan:\par 1) Enroll in aortic registry\par 2) Repeat CTA in six months \par 3) Follow up with cardiology and GI \par 4) Return as needed\par 5) Smoking cessation (referral made to Smoking cessation center)\par \par \par \par \par \par

## 2023-02-02 NOTE — PROCEDURE
[FreeTextEntry1] : Indications for surgery were reviewed at length.  Those indications are the following: size greater than 5.0 cm, symptomatic aneurysms, family history of aortic dissection or aneurysm death with a size greater than 4.5 cm, other necessary cardiac procedures such as coronary artery bypass grafting or valvular disorders with an aneurysm greater than 4.5 cm, or connective tissue disorders with an aneurysm size greater than 4.5 cm. The patient does not meet size criteria for surgical intervention at the time. \par \par Discussed activity restrictions with the patient, and would advise exercise at a moderate amount with no heavy lifting over one third of body weight, and avoiding heart rates that exceed 140 beats per minute. In addition, every patient should abstain from tobacco abuse and to avoid all illicit drug use, especially stimulants such as cocaine or methamphetamine. Also counseled patient regarding maintaining a healthy heart diet, and losing any excessive weight as this also put undue stress on both the aorta and entire cardiovascular system. First degree family members should be screened for bicuspid valve disease, and ascending aortic aneurysms. \par \par

## 2023-02-02 NOTE — REVIEW OF SYSTEMS
[Feeling Tired] : feeling tired [Eye Pain] : no eye pain [Earache] : no earache [Palpitations] : no palpitations [SOB on Exertion] : no shortness of breath during exertion [Abdominal Pain] : no abdominal pain [Heartburn] : heartburn [Dysuria] : no dysuria [Joint Swelling] : no joint swelling [Skin Wound] : no skin wound [Dizziness] : no dizziness [Anxiety] : no anxiety [Proptosis] : no proptosis [Easy Bleeding] : no tendency for easy bleeding

## 2023-02-02 NOTE — HISTORY OF PRESENT ILLNESS
[FreeTextEntry1] : Darin is a 67 year old male who was referred by Dr. Weiss for surgical consultation for aortic root dilatation. PMH noted for CAD s/p PCI to mid to distal LAD in 2017, CODP, and aortic root dilatation. He reports on going chest pain that occurs at rest. He denies palpitations, BRYANT, or syncope. He reports ongoing epigastric intermittent pain. This pain is unrelated to activitiy. He is a current smoker but has been weaning his cigarette use significantly. He reports "1 cigarette every few days."\par \par Cardiac catherization 1/17/2023\par LAD 30%, PCI is patent\par Cx segment is moderately tortuous. Minor irregularities\par RCA is large and moderately tortuous. \par Ascending aorta with moderate aortic dilatation. \par LVEF is normal \par \par TTE on 11/30/2022\par moderately dilated aortic root and mildly dilated ascending aorta\par \par CTA of chest 1/26/2023\par Sinuses of Valsalva 4.6 cm\par Sinotubular junction 4.1 cm\par Ascending aorta at the level of the right pulmonary artery 4.4 cm\par Transverse arch 3.3 cm

## 2023-02-17 ENCOUNTER — OUTPATIENT (OUTPATIENT)
Dept: OUTPATIENT SERVICES | Facility: HOSPITAL | Age: 68
LOS: 1 days | End: 2023-02-17
Payer: MEDICARE

## 2023-02-17 ENCOUNTER — APPOINTMENT (OUTPATIENT)
Dept: CV DIAGNOSTICS | Facility: HOSPITAL | Age: 68
End: 2023-02-17

## 2023-02-17 DIAGNOSIS — Z98.890 OTHER SPECIFIED POSTPROCEDURAL STATES: Chronic | ICD-10-CM

## 2023-02-17 DIAGNOSIS — I20.8 OTHER FORMS OF ANGINA PECTORIS: ICD-10-CM

## 2023-02-17 PROCEDURE — 93016 CV STRESS TEST SUPVJ ONLY: CPT

## 2023-02-17 PROCEDURE — 78452 HT MUSCLE IMAGE SPECT MULT: CPT | Mod: MH

## 2023-02-17 PROCEDURE — 93018 CV STRESS TEST I&R ONLY: CPT

## 2023-02-17 PROCEDURE — 93017 CV STRESS TEST TRACING ONLY: CPT

## 2023-02-17 PROCEDURE — A9500: CPT

## 2023-02-17 PROCEDURE — 78452 HT MUSCLE IMAGE SPECT MULT: CPT | Mod: 26,MH

## 2023-02-22 DIAGNOSIS — Z11.52 ENCOUNTER FOR SCREENING FOR COVID-19: ICD-10-CM

## 2023-03-07 LAB — SARS-COV-2 N GENE NPH QL NAA+PROBE: NOT DETECTED

## 2023-03-09 ENCOUNTER — TRANSCRIPTION ENCOUNTER (OUTPATIENT)
Age: 68
End: 2023-03-09

## 2023-03-09 ENCOUNTER — OUTPATIENT (OUTPATIENT)
Dept: OUTPATIENT SERVICES | Facility: HOSPITAL | Age: 68
LOS: 1 days | End: 2023-03-09
Payer: MEDICARE

## 2023-03-09 VITALS
TEMPERATURE: 98 F | HEIGHT: 70.87 IN | HEART RATE: 78 BPM | WEIGHT: 180.78 LBS | RESPIRATION RATE: 16 BRPM | DIASTOLIC BLOOD PRESSURE: 75 MMHG | SYSTOLIC BLOOD PRESSURE: 122 MMHG

## 2023-03-09 VITALS
OXYGEN SATURATION: 98 % | RESPIRATION RATE: 16 BRPM | SYSTOLIC BLOOD PRESSURE: 117 MMHG | HEART RATE: 72 BPM | DIASTOLIC BLOOD PRESSURE: 77 MMHG

## 2023-03-09 DIAGNOSIS — R94.39 ABNORMAL RESULT OF OTHER CARDIOVASCULAR FUNCTION STUDY: ICD-10-CM

## 2023-03-09 DIAGNOSIS — Z98.890 OTHER SPECIFIED POSTPROCEDURAL STATES: Chronic | ICD-10-CM

## 2023-03-09 LAB
ALBUMIN SERPL ELPH-MCNC: 4.1 G/DL — SIGNIFICANT CHANGE UP (ref 3.3–5)
ALP SERPL-CCNC: 89 U/L — SIGNIFICANT CHANGE UP (ref 40–120)
ALT FLD-CCNC: 28 U/L — SIGNIFICANT CHANGE UP (ref 10–45)
ANION GAP SERPL CALC-SCNC: 17 MMOL/L — SIGNIFICANT CHANGE UP (ref 5–17)
AST SERPL-CCNC: 25 U/L — SIGNIFICANT CHANGE UP (ref 10–40)
BILIRUB SERPL-MCNC: 0.5 MG/DL — SIGNIFICANT CHANGE UP (ref 0.2–1.2)
BUN SERPL-MCNC: 19 MG/DL — SIGNIFICANT CHANGE UP (ref 7–23)
CALCIUM SERPL-MCNC: 9.2 MG/DL — SIGNIFICANT CHANGE UP (ref 8.4–10.5)
CHLORIDE SERPL-SCNC: 109 MMOL/L — HIGH (ref 96–108)
CO2 SERPL-SCNC: 18 MMOL/L — LOW (ref 22–31)
CREAT SERPL-MCNC: 1.04 MG/DL — SIGNIFICANT CHANGE UP (ref 0.5–1.3)
EGFR: 79 ML/MIN/1.73M2 — SIGNIFICANT CHANGE UP
GLUCOSE SERPL-MCNC: 134 MG/DL — HIGH (ref 70–99)
HCT VFR BLD CALC: 49.3 % — SIGNIFICANT CHANGE UP (ref 39–50)
HGB BLD-MCNC: 16.3 G/DL — SIGNIFICANT CHANGE UP (ref 13–17)
MCHC RBC-ENTMCNC: 30 PG — SIGNIFICANT CHANGE UP (ref 27–34)
MCHC RBC-ENTMCNC: 33.1 GM/DL — SIGNIFICANT CHANGE UP (ref 32–36)
MCV RBC AUTO: 90.8 FL — SIGNIFICANT CHANGE UP (ref 80–100)
NRBC # BLD: 0 /100 WBCS — SIGNIFICANT CHANGE UP (ref 0–0)
PLATELET # BLD AUTO: 205 K/UL — SIGNIFICANT CHANGE UP (ref 150–400)
POTASSIUM SERPL-MCNC: 4.3 MMOL/L — SIGNIFICANT CHANGE UP (ref 3.5–5.3)
POTASSIUM SERPL-SCNC: 4.3 MMOL/L — SIGNIFICANT CHANGE UP (ref 3.5–5.3)
PROT SERPL-MCNC: 6.9 G/DL — SIGNIFICANT CHANGE UP (ref 6–8.3)
RBC # BLD: 5.43 M/UL — SIGNIFICANT CHANGE UP (ref 4.2–5.8)
RBC # FLD: 12.8 % — SIGNIFICANT CHANGE UP (ref 10.3–14.5)
SODIUM SERPL-SCNC: 144 MMOL/L — SIGNIFICANT CHANGE UP (ref 135–145)
WBC # BLD: 8.57 K/UL — SIGNIFICANT CHANGE UP (ref 3.8–10.5)
WBC # FLD AUTO: 8.57 K/UL — SIGNIFICANT CHANGE UP (ref 3.8–10.5)

## 2023-03-09 PROCEDURE — C1894: CPT

## 2023-03-09 PROCEDURE — 93458 L HRT ARTERY/VENTRICLE ANGIO: CPT

## 2023-03-09 PROCEDURE — C1760: CPT

## 2023-03-09 PROCEDURE — C1769: CPT

## 2023-03-09 PROCEDURE — 93010 ELECTROCARDIOGRAM REPORT: CPT

## 2023-03-09 PROCEDURE — 80053 COMPREHEN METABOLIC PANEL: CPT

## 2023-03-09 PROCEDURE — 93005 ELECTROCARDIOGRAM TRACING: CPT

## 2023-03-09 PROCEDURE — 36415 COLL VENOUS BLD VENIPUNCTURE: CPT

## 2023-03-09 PROCEDURE — 85027 COMPLETE CBC AUTOMATED: CPT

## 2023-03-09 PROCEDURE — C1887: CPT

## 2023-03-09 PROCEDURE — 93458 L HRT ARTERY/VENTRICLE ANGIO: CPT | Mod: 26

## 2023-03-09 RX ORDER — BUDESONIDE AND FORMOTEROL FUMARATE DIHYDRATE 160; 4.5 UG/1; UG/1
2 AEROSOL RESPIRATORY (INHALATION)
Qty: 0 | Refills: 0 | DISCHARGE

## 2023-03-09 RX ORDER — SODIUM CHLORIDE 9 MG/ML
250 INJECTION INTRAMUSCULAR; INTRAVENOUS; SUBCUTANEOUS ONCE
Refills: 0 | Status: COMPLETED | OUTPATIENT
Start: 2023-03-09 | End: 2023-03-09

## 2023-03-09 RX ORDER — SODIUM CHLORIDE 9 MG/ML
1000 INJECTION INTRAMUSCULAR; INTRAVENOUS; SUBCUTANEOUS
Refills: 0 | Status: DISCONTINUED | OUTPATIENT
Start: 2023-03-09 | End: 2023-03-23

## 2023-03-09 RX ADMIN — SODIUM CHLORIDE 75 MILLILITER(S): 9 INJECTION INTRAMUSCULAR; INTRAVENOUS; SUBCUTANEOUS at 09:04

## 2023-03-09 RX ADMIN — SODIUM CHLORIDE 750 MILLILITER(S): 9 INJECTION INTRAMUSCULAR; INTRAVENOUS; SUBCUTANEOUS at 09:04

## 2023-03-09 NOTE — H&P CARDIOLOGY - HISTORY OF PRESENT ILLNESS
Mr Jeffries is a 66 yo M presented for elective colonoscopy and subsequently had an aspiration event. PMH Serrated polyposis syndrome, CAD, HTN, Aortic Aneurysm, GERD, COPD, Former Tobacco Dependence.   Patient seen and examined at bedside. He reports having a headache which is improving. He also felt some SOB right after vomiting, but has no current SOB. He has no other complaints.   Denies fevers, chills, light-headedness, dizziness, chest pain, palpitations, abdominal pain, constipation, diarrhea, melena, hematochezia, dysuria.    68 y/o male current smoker (35PYH) now smoking about 1 cig/day with PMH of HTN, HLD, CAD s/p PCI/ANTHONY to mid/distal LAD, s/p LHC diagnostic at [facility] with LAD 30% with Patent stent (1/17/23), Aortic Root Dilatation, COPD, GERD, Colon CAD s/p Laparoscopic total abdominal colectomy and ileoproctostomy, without proctectomy 10/2021 followed by Dr. Godinez, Cardiologist and referred to Dr. Parra, CTS for TTE on 11/30/22 revealing dilated aortic root and mildly dilated ascending aortal with CTA of chest on 1/26/23 revealing Sinuses of Valsalva 4.6cm, Sinotubular junction 4.1cm, ascending aorta at the level of the right pulmonary artery 4.4cm and transverse arch 3.3cm.  Pt presents for [facility] for better images as per pt for Dr. Parra and reports epigastric discomfort which he states is related to his GERD symptoms and takes omeprazole with no symptoms of cp, sob or palpitations presently.

## 2023-03-09 NOTE — ASU PATIENT PROFILE, ADULT - FALL HARM RISK - UNIVERSAL INTERVENTIONS
Bed in lowest position, wheels locked, appropriate side rails in place/Call bell, personal items and telephone in reach/Instruct patient to call for assistance before getting out of bed or chair/Non-slip footwear when patient is out of bed/[place] to call system/Physically safe environment - no spills, clutter or unnecessary equipment/Purposeful Proactive Rounding/Room/bathroom lighting operational, light cord in reach

## 2023-03-09 NOTE — ASU DISCHARGE PLAN (ADULT/PEDIATRIC) - NS MD DC FALL RISK RISK
For information on Fall & Injury Prevention, visit: [email or web address] OR  [email or web address] OR  https://www.cdc.gov/steadi/patient.html

## 2023-03-09 NOTE — ASU PREOP CHECKLIST - PATIENT'S PERSONAL PROPERTY REMOVED
Reports minimal amount of pain to abdomen. Denies nausea. Tolerating regular diet. 6 surgical sites clean and dry with skin glue, small amount of bruising at sites. Reports burning with urination - U/A pending. Home today if cleared by all services.  Michael under stretcher

## 2023-03-09 NOTE — ASU PREOP CHECKLIST - DENTURES
PAST MEDICAL HISTORY:  Chronic kidney disease (CKD), stage III (moderate)     Diabetes     Hyperlipidemia     Hypertension     Sarcoidosis     Seizure disorder      no

## 2023-03-09 NOTE — ASU DISCHARGE PLAN (ADULT/PEDIATRIC) - CARE PROVIDER_API CALL
Brandt Magdaleno)  Thoracic and Cardiac Surgery  [address] [place] [address]  Phone: [phone]  Fax: [phone]  Established Patient  Follow Up Time: 2 weeks

## 2023-03-22 ENCOUNTER — APPOINTMENT (OUTPATIENT)
Dept: CARDIOTHORACIC SURGERY | Facility: CLINIC | Age: 68
End: 2023-03-22
Payer: MEDICARE

## 2023-03-22 VITALS
WEIGHT: 180 LBS | TEMPERATURE: 98 F | BODY MASS INDEX: 25.2 KG/M2 | HEART RATE: 74 BPM | RESPIRATION RATE: 15 BRPM | OXYGEN SATURATION: 98 % | HEIGHT: 71 IN | SYSTOLIC BLOOD PRESSURE: 149 MMHG | DIASTOLIC BLOOD PRESSURE: 76 MMHG

## 2023-03-22 DIAGNOSIS — F17.200 NICOTINE DEPENDENCE, UNSPECIFIED, UNCOMPLICATED: ICD-10-CM

## 2023-03-22 DIAGNOSIS — I20.8 OTHER FORMS OF ANGINA PECTORIS: ICD-10-CM

## 2023-03-22 DIAGNOSIS — I77.810 THORACIC AORTIC ECTASIA: ICD-10-CM

## 2023-03-22 DIAGNOSIS — K21.9 GASTRO-ESOPHAGEAL REFLUX DISEASE W/OUT ESOPHAGITIS: ICD-10-CM

## 2023-03-22 DIAGNOSIS — I13.0 HYPERTENSIVE HEART AND CHRONIC KIDNEY DISEASE WITH HEART FAILURE AND STAGE 1 THROUGH STAGE 4 CHRONIC KIDNEY DISEASE, OR UNSPECIFIED CHRONIC KIDNEY DISEASE: ICD-10-CM

## 2023-03-22 PROCEDURE — 99214 OFFICE O/P EST MOD 30 MIN: CPT

## 2023-03-22 RX ORDER — METRONIDAZOLE 500 MG/1
500 TABLET ORAL
Qty: 3 | Refills: 0 | Status: COMPLETED | COMMUNITY
Start: 2021-09-14 | End: 2023-03-22

## 2023-03-22 RX ORDER — NEOMYCIN SULFATE 500 MG/1
500 TABLET ORAL
Qty: 3 | Refills: 0 | Status: COMPLETED | COMMUNITY
Start: 2021-09-14 | End: 2023-03-22

## 2023-03-22 NOTE — END OF VISIT
[FreeTextEntry3] : I, Dr. Kevin Parra, personally performed the evaluation and management (E/M) services for this new patient.  That E/M includes conducting the initial examination, assessing all conditions, and establishing the plan of care.  Today, Stcaey Simpson NP was here to observe my evaluation and management services for this patient to be followed going forward.\par

## 2023-03-22 NOTE — HISTORY OF PRESENT ILLNESS
[FreeTextEntry1] : Darin is a 67 year old male who was referred by Dr. Weiss for surgical consultation for aortic root dilatation. PMH noted for CAD s/p PCI to mid to distal LAD in 2017, CODP, and aortic root dilatation. He reports on going chest pain that occurs at rest. He denies palpitations, BRYANT, or syncope. He reports ongoing epigastric intermittent pain. This pain is unrelated to activitiy. He is a current smoker but has been weaning his cigarette use significantly. He reports "1 cigarette every few days."\par \par Cardiac catherization 1/17/2023\par LAD 30%, PCI is patent\par Cx segment is moderately tortuous. Minor irregularities\par RCA is large and moderately tortuous. \par Ascending aorta with moderate aortic dilatation. \par LVEF is normal \par \par TTE 2/1/2023\par Aortic root 4.4 cm moderately dilated\par Sinotubular junction 4 cm\par Ascending aorta 4.4 cm moderately dilated\par Normal trileaflet aortic valve. pGr 9 mmHg, mGr 4 mmHg\par EF 56%\par \par TTE on 11/30/2022\par moderately dilated aortic root and mildly dilated ascending aorta\par \par CTA of chest 1/26/2023\par Sinuses of Valsalva 4.6 cm\par Sinotubular junction 4.1 cm\par Ascending aorta at the level of the right pulmonary artery 4.4 cm\par Transverse arch 3.3 cm

## 2023-03-22 NOTE — DATA REVIEWED
[FreeTextEntry1] : TTE 2/1/2023\par Aortic root 4.4 cm moderately dilated\par Sinotubular junction 4 cm\par Ascending aorta 4.4 cm moderately dilated\par Normal trileaflet aortic valve. pGr 9 mmHg, mGr 4 mmHg\par EF 56%\par \par Cardiac catherization \par Dr. Gillespie\par LAD 30%, PCI is patent\par Cx segment is moderately tortuous. Minor irregularities\par RCA is large and moderately tortuous. \par Ascending aorta with moderate aortic dilatation. \par LVEF is normal \par \par CTA of chest 1/26/2023\par Sinuses of Valsalva 4.6 cm\par Sinotubular junction 4.1 cm\par Ascending aorta at the level of the right pulmonary artery 4.4 cm\par Transverse arch 3.3 cm

## 2023-03-22 NOTE — ASSESSMENT
[FreeTextEntry1] : Darni is a 67 year old male who was referred by Dr. Weiss for surgical consultation for aortic root dilatation. PMH noted for CAD s/p PCI to mid to distal LAD in 2017, CODP, and aortic root dilatation. He reports on going chest pain that occurs at rest. He denies palpitations, BRYANT, or syncope. \par \par Cardiac catherization 1/17/2023\par LAD 30%, PCI is patent\par Cx segment is moderately tortuous. Minor irregularities\par RCA is large and moderately tortuous. \par Ascending aorta with moderate aortic dilatation. \par LVEF is normal \par \par TTE on 11/30/2022\par moderately dilated aortic root and mildly dilated ascending aorta\par \par CTA of chest 1/26/2023\par Sinuses of Valsalva 4.6 cm\par Sinotubular junction 4.1 cm\par Ascending aorta at the level of the right pulmonary artery 4.4 cm\par Transverse arch 3.3 cm\par \par I have reviewed the patient's medical records, diagnostic images during the time of this office visit and have made the following recommendation. Review of the imaging shows his aortic pathology has progressed and is symptomatic. This will require surgical intervention. \par \par Risks, benefits and alternatives to surgery discussed. Risks included but not limited to bleeding, risk of stroke, myocardial Infarction, kidney problems, blood transfusion, permanent pacemaker implantation, infections and death. Operative mortality and complication risks are low. Various approaches discussed in detail. The patient will benefit and is a candidate for ascending aortic replacement. \par \par All questions have been fully answered and concerns addressed. Patient would like to proceed with surgery.\par \par Plan:\par 1) HOLD ASPIRIN for 7 DAYS BEFORE SURGERY\par 2) Start Wellbutrin for smoking cessation \par 3) Ascending aortic replacement \par \par \par \par \par \par

## 2023-03-22 NOTE — REVIEW OF SYSTEMS
[Feeling Tired] : feeling tired [Chest Pain] : chest pain [Joint Stiffness] : joint stiffness [Eye Pain] : no eye pain [Earache] : no earache [Lower Ext Edema] : no extremity edema [SOB on Exertion] : no shortness of breath during exertion [Abdominal Pain] : no abdominal pain [Dizziness] : no dizziness [Anxiety] : no anxiety [Muscle Weakness] : no muscle weakness [Easy Bleeding] : no tendency for easy bleeding

## 2023-03-22 NOTE — PHYSICAL EXAM
[Sclera] : the sclera and conjunctiva were normal [Jugular Venous Distention Increased] : there was no jugular-venous distention [Respiration, Rhythm And Depth] : normal respiratory rhythm and effort [Auscultation Breath Sounds / Voice Sounds] : lungs were clear to auscultation bilaterally [Heart Rate And Rhythm] : heart rate was normal and rhythm regular [Examination Of The Chest] : the chest was normal in appearance [Bowel Sounds] : normal bowel sounds [Cervical Lymph Nodes Enlarged Posterior Bilaterally] : posterior cervical [Cervical Lymph Nodes Enlarged Anterior Bilaterally] : anterior cervical [No CVA Tenderness] : no ~M costovertebral angle tenderness [Abnormal Walk] : normal gait [Skin Color & Pigmentation] : normal skin color and pigmentation [Oriented To Time, Place, And Person] : oriented to person, place, and time [Right Carotid Bruit] : no bruit heard over the right carotid [Left Carotid Bruit] : no bruit heard over the left carotid

## 2023-04-10 ENCOUNTER — OUTPATIENT (OUTPATIENT)
Dept: OUTPATIENT SERVICES | Facility: HOSPITAL | Age: 68
LOS: 1 days | End: 2023-04-10

## 2023-04-10 ENCOUNTER — OUTPATIENT (OUTPATIENT)
Dept: OUTPATIENT SERVICES | Facility: HOSPITAL | Age: 68
LOS: 1 days | End: 2023-04-10
Payer: MEDICARE

## 2023-04-10 ENCOUNTER — TRANSCRIPTION ENCOUNTER (OUTPATIENT)
Age: 68
End: 2023-04-10

## 2023-04-10 VITALS
TEMPERATURE: 98 F | RESPIRATION RATE: 18 BRPM | DIASTOLIC BLOOD PRESSURE: 87 MMHG | HEART RATE: 76 BPM | HEIGHT: 71 IN | WEIGHT: 195.11 LBS | SYSTOLIC BLOOD PRESSURE: 148 MMHG | OXYGEN SATURATION: 99 %

## 2023-04-10 DIAGNOSIS — Z95.818 PRESENCE OF OTHER CARDIAC IMPLANTS AND GRAFTS: Chronic | ICD-10-CM

## 2023-04-10 DIAGNOSIS — Z98.890 OTHER SPECIFIED POSTPROCEDURAL STATES: Chronic | ICD-10-CM

## 2023-04-10 DIAGNOSIS — Z95.5 PRESENCE OF CORONARY ANGIOPLASTY IMPLANT AND GRAFT: Chronic | ICD-10-CM

## 2023-04-10 DIAGNOSIS — Z29.9 ENCOUNTER FOR PROPHYLACTIC MEASURES, UNSPECIFIED: ICD-10-CM

## 2023-04-10 DIAGNOSIS — Z01.818 ENCOUNTER FOR OTHER PREPROCEDURAL EXAMINATION: ICD-10-CM

## 2023-04-10 DIAGNOSIS — Z11.52 ENCOUNTER FOR SCREENING FOR COVID-19: ICD-10-CM

## 2023-04-10 DIAGNOSIS — G47.33 OBSTRUCTIVE SLEEP APNEA (ADULT) (PEDIATRIC): ICD-10-CM

## 2023-04-10 DIAGNOSIS — Z90.89 ACQUIRED ABSENCE OF OTHER ORGANS: Chronic | ICD-10-CM

## 2023-04-10 DIAGNOSIS — Z90.49 ACQUIRED ABSENCE OF OTHER SPECIFIED PARTS OF DIGESTIVE TRACT: Chronic | ICD-10-CM

## 2023-04-10 DIAGNOSIS — I71.20 THORACIC AORTIC ANEURYSM, WITHOUT RUPTURE, UNSPECIFIED: ICD-10-CM

## 2023-04-10 LAB
A1C WITH ESTIMATED AVERAGE GLUCOSE RESULT: 6.3 % — HIGH (ref 4–5.6)
ANION GAP SERPL CALC-SCNC: 15 MMOL/L — SIGNIFICANT CHANGE UP (ref 5–17)
BLD GP AB SCN SERPL QL: NEGATIVE — SIGNIFICANT CHANGE UP
BUN SERPL-MCNC: 17 MG/DL — SIGNIFICANT CHANGE UP (ref 7–23)
CALCIUM SERPL-MCNC: 9.4 MG/DL — SIGNIFICANT CHANGE UP (ref 8.4–10.5)
CHLORIDE SERPL-SCNC: 105 MMOL/L — SIGNIFICANT CHANGE UP (ref 96–108)
CO2 SERPL-SCNC: 23 MMOL/L — SIGNIFICANT CHANGE UP (ref 22–31)
CREAT SERPL-MCNC: 0.95 MG/DL — SIGNIFICANT CHANGE UP (ref 0.5–1.3)
EGFR: 88 ML/MIN/1.73M2 — SIGNIFICANT CHANGE UP
ESTIMATED AVERAGE GLUCOSE: 134 MG/DL — HIGH (ref 68–114)
GLUCOSE SERPL-MCNC: 127 MG/DL — HIGH (ref 70–99)
HCT VFR BLD CALC: 50.3 % — HIGH (ref 39–50)
HGB BLD-MCNC: 16.7 G/DL — SIGNIFICANT CHANGE UP (ref 13–17)
MCHC RBC-ENTMCNC: 29.6 PG — SIGNIFICANT CHANGE UP (ref 27–34)
MCHC RBC-ENTMCNC: 33.2 GM/DL — SIGNIFICANT CHANGE UP (ref 32–36)
MCV RBC AUTO: 89 FL — SIGNIFICANT CHANGE UP (ref 80–100)
MRSA PCR RESULT.: SIGNIFICANT CHANGE UP
NRBC # BLD: 0 /100 WBCS — SIGNIFICANT CHANGE UP (ref 0–0)
PLATELET # BLD AUTO: 201 K/UL — SIGNIFICANT CHANGE UP (ref 150–400)
POTASSIUM SERPL-MCNC: 3.6 MMOL/L — SIGNIFICANT CHANGE UP (ref 3.5–5.3)
POTASSIUM SERPL-SCNC: 3.6 MMOL/L — SIGNIFICANT CHANGE UP (ref 3.5–5.3)
RBC # BLD: 5.65 M/UL — SIGNIFICANT CHANGE UP (ref 4.2–5.8)
RBC # FLD: 12.7 % — SIGNIFICANT CHANGE UP (ref 10.3–14.5)
RH IG SCN BLD-IMP: POSITIVE — SIGNIFICANT CHANGE UP
S AUREUS DNA NOSE QL NAA+PROBE: SIGNIFICANT CHANGE UP
SARS-COV-2 RNA SPEC QL NAA+PROBE: SIGNIFICANT CHANGE UP
SODIUM SERPL-SCNC: 143 MMOL/L — SIGNIFICANT CHANGE UP (ref 135–145)
WBC # BLD: 7.85 K/UL — SIGNIFICANT CHANGE UP (ref 3.8–10.5)
WBC # FLD AUTO: 7.85 K/UL — SIGNIFICANT CHANGE UP (ref 3.8–10.5)

## 2023-04-10 PROCEDURE — 71046 X-RAY EXAM CHEST 2 VIEWS: CPT | Mod: 26

## 2023-04-10 PROCEDURE — 93880 EXTRACRANIAL BILAT STUDY: CPT | Mod: 26

## 2023-04-10 RX ORDER — CEFUROXIME AXETIL 250 MG
1500 TABLET ORAL ONCE
Refills: 0 | Status: COMPLETED | OUTPATIENT
Start: 2023-04-11 | End: 2023-04-11

## 2023-04-10 NOTE — H&P PST ADULT - PROBLEM SELECTOR PLAN 1
pt scheduled for Ascending Aortic Replacement with Dr. Parra on 4/11/2023.   Pre-op instructions given, all questions answered.  Covid 19 PCR performed in PST  Labs: CBC, BMP, T&S, A1C, MRSA, CXR, Carotid Dopplers pt scheduled for Ascending Aortic Replacement with Dr. Parra on 4/11/2023.   Pre-op instructions given, all questions answered.  Covid 19 PCR performed in PST  Labs: CBC, BMP, T&S, A1C, MRSA, CXR, Carotid Dopplers  last dose of aspirin 4/10/23 communicated to Ct surgery, ok to proceed

## 2023-04-10 NOTE — H&P PST ADULT - ASSESSMENT
DASI Score:5.59  DASI Activity: to do mild house chore with some SOB. able to walk 1 or 2 blocks with mild SOD. pt  state works is pretty active but takes it slow due to able to go food shopping   Loose or removable teeth: denies      CAPRINI SCORE [CLOT]    AGE RELATED RISK FACTORS                                                       MOBILITY RELATED FACTORS  [ ] Age 41-60 years                                            (1 Point)                  [ ] Bed rest                                                        (1 Point)  [ x] Age: 61-74 years                                           (2 Points)                 [ ] Plaster cast                                                   (2 Points)  [ ] Age= 75 years                                              (3 Points)                 [ ] Bed bound for more than 72 hours                 (2 Points)    DISEASE RELATED RISK FACTORS                                               GENDER SPECIFIC FACTORS  [ ] Edema in the lower extremities                       (1 Point)                  [ ] Pregnancy                                                     (1 Point)  [ ] Varicose veins                                               (1 Point)                  [ ] Post-partum < 6 weeks                                   (1 Point)             [x ] BMI > 25 Kg/m2                                            (1 Point)                  [ ] Hormonal therapy  or oral contraception          (1 Point)                 [ ] Sepsis (in the previous month)                        (1 Point)                  [ ] History of pregnancy complications                 (1 point)  [ ] Pneumonia or serious lung disease                                               [ ] Unexplained or recurrent                     (1 Point)           (in the previous month)                               (1 Point)  [ ] Abnormal pulmonary function test                     (1 Point)                 SURGERY RELATED RISK FACTORS  [ ] Acute myocardial infarction                              (1 Point)                 [ ]  Section                                             (1 Point)  [ ] Congestive heart failure (in the previous month)  (1 Point)               [ ] Minor surgery                                                  (1 Point)   [ ] Inflammatory bowel disease                             (1 Point)                 [ ] Arthroscopic surgery                                        (2 Points)  [ ] Central venous access                                      (2 Points)                [ x] General surgery lasting more than 45 minutes   (2 Points)       [ ] Stroke (in the previous month)                          (5 Points)               [ ] Elective arthroplasty                                         (5 Points)                                                                                                                                               HEMATOLOGY RELATED FACTORS                                                 TRAUMA RELATED RISK FACTORS  [ ] Prior episodes of VTE                                     (3 Points)                [ ] Fracture of the hip, pelvis, or leg                       (5 Points)  [ ] Positive family history for VTE                         (3 Points)                 [ ] Acute spinal cord injury (in the previous month)  (5 Points)  [ ] Prothrombin 97239 A                                     (3 Points)                 [ ] Paralysis  (less than 1 month)                             (5 Points)  [ ] Factor V Leiden                                             (3 Points)                  [ ] Multiple Trauma within 1 month                        (5 Points)  [ ] Lupus anticoagulants                                     (3 Points)                                                           [ ] Anticardiolipin antibodies                               (3 Points)                                                       [ ] High homocysteine in the blood                      (3 Points)                                             [ ] Other congenital or acquired thrombophilia      (3 Points)                                                [ ] Heparin induced thrombocytopenia                  (3 Points)                                          Total Score [  4 ]    Caprini Score 0 - 2:  Low Risk, No VTE Prophylaxis required for most patients, encourage ambulation  Caprini Score 3 - 6:  At Risk, pharmacologic VTE prophylaxis is indicated for most patients (in the absence of a contraindication)  Caprini Score Greater than or = 7:  High Risk, pharmacologic VTE prophylaxis is indicated for most patients (in the absence of a contraindication)

## 2023-04-10 NOTE — H&P PST ADULT - NSICDXPASTSURGICALHX_GEN_ALL_CORE_FT
PAST SURGICAL HISTORY:  H/O inguinal hernia repair     H/O umbilical hernia repair     History of bowel resection     History of cholecystectomy     History of tonsillectomy     Implantable loop recorder present     S/P colonoscopy     S/P hernia repair bilateral    Stented coronary artery

## 2023-04-10 NOTE — H&P PST ADULT - NSICDXPROCEDURE_GEN_ALL_CORE_FT
Patient arrives to ED with [facility] for complaints of throbbing left ankle pain     Patient has ace wrap to left ankle at time of arrival
PROCEDURES:  Replacement of aortic root 10-Apr-2023 11:50:37  Reina Stringer

## 2023-04-10 NOTE — H&P PST ADULT - NSICDXPASTMEDICALHX_GEN_ALL_CORE_FT
PAST MEDICAL HISTORY:  Arthritis     Aspiration pneumonia April 2021    CAD (coronary artery disease) coronary artery stenting 2017 (LAD x1 stent)    COPD without exacerbation uses symbicort PRN    Current smoker 1 cigar twice a week   smoking started in 20's    Fatty liver     GERD (gastroesophageal reflux disease)     H/O unstable angina prompted cardiac cath in 2017  stent x 1 LAD    History of cholecystectomy     HLD (hyperlipidemia)     HTN (hypertension)     Kidney stone     Liver nodule     Lung nodule     Sepsis with acute renal failure     Thoracic aortic aneurysm diagnosed 4 years ago (4.2 no change per patient, last CT 2021- Dr. Cope)  states was evaluated by Dr. Lancaster and advised no need for surgery, just monitoring.    Thyroid nodule

## 2023-04-10 NOTE — H&P PST ADULT - HISTORY OF PRESENT ILLNESS
year old female/male presents to PST prior to scheduled   with  on 4/11/2023. PMhx   . PShx d    Dec 2022 , light cold like symptoms, Paxlovid   Non 2021 colon rescetions   67 year old male presents to PST prior to scheduled Ascending Aortic Replacement with  on 4/11/2023. PMhx Current smoker ( 20 pack years), COPD, CAD, HTN, HLD, GERD, Thyroid nodule, Lung Nodule, Liver Nodule, Fatty Liver,  PShx Colon resection (Nov. 2021), Appendectomy,     Dec 2022 , light cold like symptoms, Paxlovid   November 2021 colon rescetions   67 year old male presents to PST prior to scheduled Ascending Aortic Replacement with Dr. Parra on 4/11/2023. PMhx Current smoker (20 pack years), COPD, CAD, Dilated aortic root, HTN, HLD, GERD, Thyroid nodule, Lung Nodule, Liver Nodule, Fatty Liver, kidney stones, colon polyps s/p colon resection. PShx Colon resection (Nov. 2021), Appendectomy, umbilical hernia repair, b/l inguinal hernia repair , tonsillectomy, Loop recorder, Coronary stents. C/o ongoing epigastric intermittent pain unrelated to activity. Current smoker but has been weaning his cigarette use significantly. Denies any chest pain, palpitations, SOB, N/V, fever or chills.     Pt endorse covid 19 infection on Dec 2022 , light cold like symptoms, treated with Paxlovid.    ***COVID-19 PCR swab performed in PST.    67 year old male presents to PST prior to scheduled Ascending Aortic Replacement with Dr. Parra on 4/11/2023. PMhx Current smoker (20 pack years), COPD, CAD, Dilated aortic root, HTN, HLD, GERD, Thyroid nodule, Lung Nodule, Liver Nodule, Fatty Liver, kidney stones, colon polyps s/p colon resection. PShx Colon resection (Nov. 2021), Appendectomy, umbilical hernia repair, b/l inguinal hernia repair, tonsillectomy, Loop recorder, Coronary stents. C/o ongoing epigastric intermittent pain unrelated to activity. Current smoker but has been weaning his cigarette use significantly. At present denies any chest pain, palpitations, SOB, N/V, fever or chills.     Pt endorse covid 19 infection on Dec 2022 , light cold like symptoms, treated with Paxlovid.    ***COVID-19 PCR swab performed in PST.

## 2023-04-11 ENCOUNTER — RESULT REVIEW (OUTPATIENT)
Age: 68
End: 2023-04-11

## 2023-04-11 ENCOUNTER — INPATIENT (INPATIENT)
Facility: HOSPITAL | Age: 68
LOS: 4 days | Discharge: HOME HEALTH | DRG: 221 | End: 2023-04-16
Attending: THORACIC SURGERY (CARDIOTHORACIC VASCULAR SURGERY) | Admitting: THORACIC SURGERY (CARDIOTHORACIC VASCULAR SURGERY)
Payer: MEDICARE

## 2023-04-11 ENCOUNTER — TRANSCRIPTION ENCOUNTER (OUTPATIENT)
Age: 68
End: 2023-04-11

## 2023-04-11 VITALS
HEART RATE: 72 BPM | RESPIRATION RATE: 18 BRPM | TEMPERATURE: 98 F | DIASTOLIC BLOOD PRESSURE: 86 MMHG | WEIGHT: 194.23 LBS | SYSTOLIC BLOOD PRESSURE: 137 MMHG | HEIGHT: 71 IN | OXYGEN SATURATION: 97 %

## 2023-04-11 DIAGNOSIS — Z95.5 PRESENCE OF CORONARY ANGIOPLASTY IMPLANT AND GRAFT: Chronic | ICD-10-CM

## 2023-04-11 DIAGNOSIS — Z98.890 OTHER SPECIFIED POSTPROCEDURAL STATES: Chronic | ICD-10-CM

## 2023-04-11 DIAGNOSIS — I71.20 THORACIC AORTIC ANEURYSM, WITHOUT RUPTURE, UNSPECIFIED: ICD-10-CM

## 2023-04-11 DIAGNOSIS — Z90.49 ACQUIRED ABSENCE OF OTHER SPECIFIED PARTS OF DIGESTIVE TRACT: Chronic | ICD-10-CM

## 2023-04-11 DIAGNOSIS — Z90.89 ACQUIRED ABSENCE OF OTHER ORGANS: Chronic | ICD-10-CM

## 2023-04-11 DIAGNOSIS — Z95.818 PRESENCE OF OTHER CARDIAC IMPLANTS AND GRAFTS: Chronic | ICD-10-CM

## 2023-04-11 LAB
ALBUMIN SERPL ELPH-MCNC: 3.1 G/DL — LOW (ref 3.3–5)
ALP SERPL-CCNC: 55 U/L — SIGNIFICANT CHANGE UP (ref 40–120)
ALT FLD-CCNC: 23 U/L — SIGNIFICANT CHANGE UP (ref 10–45)
ANION GAP SERPL CALC-SCNC: 14 MMOL/L — SIGNIFICANT CHANGE UP (ref 5–17)
APTT BLD: 29.8 SEC — SIGNIFICANT CHANGE UP (ref 27.5–35.5)
AST SERPL-CCNC: 50 U/L — HIGH (ref 10–40)
BASE EXCESS BLDV CALC-SCNC: -0.1 MMOL/L — SIGNIFICANT CHANGE UP (ref -2–3)
BASE EXCESS BLDV CALC-SCNC: -0.3 MMOL/L — SIGNIFICANT CHANGE UP (ref -2–3)
BASE EXCESS BLDV CALC-SCNC: -1.3 MMOL/L — SIGNIFICANT CHANGE UP (ref -2–3)
BASE EXCESS BLDV CALC-SCNC: -3.2 MMOL/L — LOW (ref -2–3)
BASE EXCESS BLDV CALC-SCNC: 0 MMOL/L — SIGNIFICANT CHANGE UP (ref -2–3)
BASE EXCESS BLDV CALC-SCNC: 0.6 MMOL/L — SIGNIFICANT CHANGE UP (ref -2–3)
BASE EXCESS BLDV CALC-SCNC: 0.7 MMOL/L — SIGNIFICANT CHANGE UP (ref -2–3)
BASOPHILS # BLD AUTO: 0.03 K/UL — SIGNIFICANT CHANGE UP (ref 0–0.2)
BASOPHILS NFR BLD AUTO: 0.3 % — SIGNIFICANT CHANGE UP (ref 0–2)
BILIRUB SERPL-MCNC: 1.3 MG/DL — HIGH (ref 0.2–1.2)
BLOOD GAS VENOUS - CREATININE: SIGNIFICANT CHANGE UP MG/DL (ref 0.5–1.3)
BUN SERPL-MCNC: 13 MG/DL — SIGNIFICANT CHANGE UP (ref 7–23)
CA-I SERPL-SCNC: 0.88 MMOL/L — LOW (ref 1.15–1.33)
CA-I SERPL-SCNC: 0.9 MMOL/L — LOW (ref 1.15–1.33)
CA-I SERPL-SCNC: 0.96 MMOL/L — LOW (ref 1.15–1.33)
CA-I SERPL-SCNC: 0.97 MMOL/L — LOW (ref 1.15–1.33)
CA-I SERPL-SCNC: 0.99 MMOL/L — LOW (ref 1.15–1.33)
CA-I SERPL-SCNC: 1.16 MMOL/L — SIGNIFICANT CHANGE UP (ref 1.15–1.33)
CA-I SERPL-SCNC: 1.28 MMOL/L — SIGNIFICANT CHANGE UP (ref 1.15–1.33)
CALCIUM SERPL-MCNC: 8.2 MG/DL — LOW (ref 8.4–10.5)
CHLORIDE BLDV-SCNC: 101 MMOL/L — SIGNIFICANT CHANGE UP (ref 96–108)
CHLORIDE BLDV-SCNC: 103 MMOL/L — SIGNIFICANT CHANGE UP (ref 96–108)
CHLORIDE BLDV-SCNC: 105 MMOL/L — SIGNIFICANT CHANGE UP (ref 96–108)
CHLORIDE BLDV-SCNC: 106 MMOL/L — SIGNIFICANT CHANGE UP (ref 96–108)
CHLORIDE BLDV-SCNC: 107 MMOL/L — SIGNIFICANT CHANGE UP (ref 96–108)
CHLORIDE SERPL-SCNC: 107 MMOL/L — SIGNIFICANT CHANGE UP (ref 96–108)
CK MB BLD-MCNC: 8.6 % — HIGH (ref 0–3.5)
CK MB CFR SERPL CALC: 23.5 NG/ML — HIGH (ref 0–6.7)
CK SERPL-CCNC: 274 U/L — HIGH (ref 30–200)
CO2 BLDV-SCNC: 24 MMOL/L — SIGNIFICANT CHANGE UP (ref 22–26)
CO2 BLDV-SCNC: 27 MMOL/L — HIGH (ref 22–26)
CO2 BLDV-SCNC: 27 MMOL/L — HIGH (ref 22–26)
CO2 BLDV-SCNC: 28 MMOL/L — HIGH (ref 22–26)
CO2 BLDV-SCNC: 28 MMOL/L — HIGH (ref 22–26)
CO2 BLDV-SCNC: 29 MMOL/L — HIGH (ref 22–26)
CO2 BLDV-SCNC: 30 MMOL/L — HIGH (ref 22–26)
CO2 SERPL-SCNC: 22 MMOL/L — SIGNIFICANT CHANGE UP (ref 22–31)
CREAT SERPL-MCNC: 0.97 MG/DL — SIGNIFICANT CHANGE UP (ref 0.5–1.3)
EGFR: 86 ML/MIN/1.73M2 — SIGNIFICANT CHANGE UP
EOSINOPHIL # BLD AUTO: 0.06 K/UL — SIGNIFICANT CHANGE UP (ref 0–0.5)
EOSINOPHIL NFR BLD AUTO: 0.6 % — SIGNIFICANT CHANGE UP (ref 0–6)
FIBRINOGEN PPP-MCNC: 200 MG/DL — SIGNIFICANT CHANGE UP (ref 200–445)
GAS PNL BLDA: SIGNIFICANT CHANGE UP
GAS PNL BLDV: 136 MMOL/L — SIGNIFICANT CHANGE UP (ref 136–145)
GAS PNL BLDV: 137 MMOL/L — SIGNIFICANT CHANGE UP (ref 136–145)
GAS PNL BLDV: 137 MMOL/L — SIGNIFICANT CHANGE UP (ref 136–145)
GAS PNL BLDV: 138 MMOL/L — SIGNIFICANT CHANGE UP (ref 136–145)
GAS PNL BLDV: SIGNIFICANT CHANGE UP
GLUCOSE BLDV-MCNC: 115 MG/DL — HIGH (ref 70–99)
GLUCOSE BLDV-MCNC: 116 MG/DL — HIGH (ref 70–99)
GLUCOSE BLDV-MCNC: 128 MG/DL — HIGH (ref 70–99)
GLUCOSE BLDV-MCNC: 132 MG/DL — HIGH (ref 70–99)
GLUCOSE BLDV-MCNC: 135 MG/DL — HIGH (ref 70–99)
GLUCOSE BLDV-MCNC: 136 MG/DL — HIGH (ref 70–99)
GLUCOSE BLDV-MCNC: 144 MG/DL — HIGH (ref 70–99)
GLUCOSE SERPL-MCNC: 140 MG/DL — HIGH (ref 70–99)
HCO3 BLDV-SCNC: 23 MMOL/L — SIGNIFICANT CHANGE UP (ref 22–29)
HCO3 BLDV-SCNC: 26 MMOL/L — SIGNIFICANT CHANGE UP (ref 22–29)
HCO3 BLDV-SCNC: 26 MMOL/L — SIGNIFICANT CHANGE UP (ref 22–29)
HCO3 BLDV-SCNC: 27 MMOL/L — SIGNIFICANT CHANGE UP (ref 22–29)
HCO3 BLDV-SCNC: 28 MMOL/L — SIGNIFICANT CHANGE UP (ref 22–29)
HCT VFR BLD CALC: 39.4 % — SIGNIFICANT CHANGE UP (ref 39–50)
HCT VFR BLDA CALC: 35 % — LOW (ref 39–51)
HCT VFR BLDA CALC: 36 % — LOW (ref 39–51)
HCT VFR BLDA CALC: 37 % — LOW (ref 39–51)
HCT VFR BLDA CALC: 38 % — LOW (ref 39–51)
HCT VFR BLDA CALC: 41 % — SIGNIFICANT CHANGE UP (ref 39–51)
HGB BLD CALC-MCNC: 11.7 G/DL — LOW (ref 12.6–17.4)
HGB BLD CALC-MCNC: 12.1 G/DL — LOW (ref 12.6–17.4)
HGB BLD CALC-MCNC: 12.3 G/DL — LOW (ref 12.6–17.4)
HGB BLD CALC-MCNC: 12.7 G/DL — SIGNIFICANT CHANGE UP (ref 12.6–17.4)
HGB BLD CALC-MCNC: 13.7 G/DL — SIGNIFICANT CHANGE UP (ref 12.6–17.4)
HGB BLD-MCNC: 13.5 G/DL — SIGNIFICANT CHANGE UP (ref 13–17)
HOROWITZ INDEX BLDV+IHG-RTO: 100 — SIGNIFICANT CHANGE UP
IMM GRANULOCYTES NFR BLD AUTO: 1.1 % — HIGH (ref 0–0.9)
INR BLD: 0.97 RATIO — SIGNIFICANT CHANGE UP (ref 0.88–1.16)
LACTATE BLDV-MCNC: 1.1 MMOL/L — SIGNIFICANT CHANGE UP (ref 0.5–2)
LACTATE BLDV-MCNC: 1.2 MMOL/L — SIGNIFICANT CHANGE UP (ref 0.5–2)
LACTATE BLDV-MCNC: 1.2 MMOL/L — SIGNIFICANT CHANGE UP (ref 0.5–2)
LACTATE BLDV-MCNC: 1.4 MMOL/L — SIGNIFICANT CHANGE UP (ref 0.5–2)
LACTATE BLDV-MCNC: 1.7 MMOL/L — SIGNIFICANT CHANGE UP (ref 0.5–2)
LYMPHOCYTES # BLD AUTO: 0.76 K/UL — LOW (ref 1–3.3)
LYMPHOCYTES # BLD AUTO: 7.5 % — LOW (ref 13–44)
MAGNESIUM SERPL-MCNC: 2.9 MG/DL — HIGH (ref 1.6–2.6)
MCHC RBC-ENTMCNC: 30.2 PG — SIGNIFICANT CHANGE UP (ref 27–34)
MCHC RBC-ENTMCNC: 34.3 GM/DL — SIGNIFICANT CHANGE UP (ref 32–36)
MCV RBC AUTO: 88.1 FL — SIGNIFICANT CHANGE UP (ref 80–100)
MONOCYTES # BLD AUTO: 0.82 K/UL — SIGNIFICANT CHANGE UP (ref 0–0.9)
MONOCYTES NFR BLD AUTO: 8.1 % — SIGNIFICANT CHANGE UP (ref 2–14)
NEUTROPHILS # BLD AUTO: 8.39 K/UL — HIGH (ref 1.8–7.4)
NEUTROPHILS NFR BLD AUTO: 82.4 % — HIGH (ref 43–77)
NRBC # BLD: 0 /100 WBCS — SIGNIFICANT CHANGE UP (ref 0–0)
PCO2 BLDV: 43 MMHG — SIGNIFICANT CHANGE UP (ref 42–55)
PCO2 BLDV: 44 MMHG — SIGNIFICANT CHANGE UP (ref 42–55)
PCO2 BLDV: 48 MMHG — SIGNIFICANT CHANGE UP (ref 42–55)
PCO2 BLDV: 52 MMHG — SIGNIFICANT CHANGE UP (ref 42–55)
PCO2 BLDV: 52 MMHG — SIGNIFICANT CHANGE UP (ref 42–55)
PCO2 BLDV: 58 MMHG — HIGH (ref 42–55)
PCO2 BLDV: 59 MMHG — HIGH (ref 42–55)
PH BLDV: 7.27 — LOW (ref 7.32–7.43)
PH BLDV: 7.28 — LOW (ref 7.32–7.43)
PH BLDV: 7.32 — SIGNIFICANT CHANGE UP (ref 7.32–7.43)
PH BLDV: 7.33 — SIGNIFICANT CHANGE UP (ref 7.32–7.43)
PH BLDV: 7.33 — SIGNIFICANT CHANGE UP (ref 7.32–7.43)
PH BLDV: 7.34 — SIGNIFICANT CHANGE UP (ref 7.32–7.43)
PH BLDV: 7.38 — SIGNIFICANT CHANGE UP (ref 7.32–7.43)
PHOSPHATE SERPL-MCNC: 2.7 MG/DL — SIGNIFICANT CHANGE UP (ref 2.5–4.5)
PLATELET # BLD AUTO: 128 K/UL — LOW (ref 150–400)
PO2 BLDV: 156 MMHG — HIGH (ref 25–45)
PO2 BLDV: 179 MMHG — HIGH (ref 25–45)
PO2 BLDV: 184 MMHG — HIGH (ref 25–45)
PO2 BLDV: 51 MMHG — HIGH (ref 25–45)
PO2 BLDV: 64 MMHG — HIGH (ref 25–45)
PO2 BLDV: 72 MMHG — HIGH (ref 25–45)
PO2 BLDV: 80 MMHG — HIGH (ref 25–45)
POTASSIUM BLDV-SCNC: 3.5 MMOL/L — SIGNIFICANT CHANGE UP (ref 3.5–5.1)
POTASSIUM BLDV-SCNC: 3.7 MMOL/L — SIGNIFICANT CHANGE UP (ref 3.5–5.1)
POTASSIUM BLDV-SCNC: 4.1 MMOL/L — SIGNIFICANT CHANGE UP (ref 3.5–5.1)
POTASSIUM BLDV-SCNC: 4.5 MMOL/L — SIGNIFICANT CHANGE UP (ref 3.5–5.1)
POTASSIUM BLDV-SCNC: 4.9 MMOL/L — SIGNIFICANT CHANGE UP (ref 3.5–5.1)
POTASSIUM BLDV-SCNC: 5 MMOL/L — SIGNIFICANT CHANGE UP (ref 3.5–5.1)
POTASSIUM BLDV-SCNC: 5.2 MMOL/L — HIGH (ref 3.5–5.1)
POTASSIUM SERPL-MCNC: 4.3 MMOL/L — SIGNIFICANT CHANGE UP (ref 3.5–5.3)
POTASSIUM SERPL-SCNC: 4.3 MMOL/L — SIGNIFICANT CHANGE UP (ref 3.5–5.3)
PROT SERPL-MCNC: 4.9 G/DL — LOW (ref 6–8.3)
PROTHROM AB SERPL-ACNC: 11.3 SEC — SIGNIFICANT CHANGE UP (ref 10.5–13.4)
RBC # BLD: 4.47 M/UL — SIGNIFICANT CHANGE UP (ref 4.2–5.8)
RBC # FLD: 12.6 % — SIGNIFICANT CHANGE UP (ref 10.3–14.5)
SAO2 % BLDV: 100 % — HIGH (ref 67–88)
SAO2 % BLDV: 84.4 % — SIGNIFICANT CHANGE UP (ref 67–88)
SAO2 % BLDV: 91 % — HIGH (ref 67–88)
SAO2 % BLDV: 96.1 % — HIGH (ref 67–88)
SAO2 % BLDV: 98.8 % — HIGH (ref 67–88)
SAO2 % BLDV: 99.7 % — HIGH (ref 67–88)
SAO2 % BLDV: 99.9 % — HIGH (ref 67–88)
SODIUM SERPL-SCNC: 143 MMOL/L — SIGNIFICANT CHANGE UP (ref 135–145)
TROPONIN T, HIGH SENSITIVITY RESULT: 326 NG/L — HIGH (ref 0–51)
WBC # BLD: 10.17 K/UL — SIGNIFICANT CHANGE UP (ref 3.8–10.5)
WBC # FLD AUTO: 10.17 K/UL — SIGNIFICANT CHANGE UP (ref 3.8–10.5)

## 2023-04-11 PROCEDURE — 88305 TISSUE EXAM BY PATHOLOGIST: CPT | Mod: 26

## 2023-04-11 PROCEDURE — 71045 X-RAY EXAM CHEST 1 VIEW: CPT | Mod: 26

## 2023-04-11 PROCEDURE — 99291 CRITICAL CARE FIRST HOUR: CPT

## 2023-04-11 PROCEDURE — 33859 AS-AORT GRF F/DS OTH/THN DSJ: CPT

## 2023-04-11 DEVICE — KIT CVC 2LUM MAC 9FR CHG: Type: IMPLANTABLE DEVICE | Status: FUNCTIONAL

## 2023-04-11 DEVICE — CANNULA ANTEGRADE CARDIOPLEGIA 14 GA STRL: Type: IMPLANTABLE DEVICE | Status: FUNCTIONAL

## 2023-04-11 DEVICE — CANNULA AORTIC PERFUSION EZ GLIDE STRAIGHT 21FR X 35CM VENTED: Type: IMPLANTABLE DEVICE | Status: FUNCTIONAL

## 2023-04-11 DEVICE — SURGICEL FIBRILLAR 2 X 4": Type: IMPLANTABLE DEVICE | Status: FUNCTIONAL

## 2023-04-11 DEVICE — CATH VENT VENTRICULAR PVC 18FR X 4.25" TIP PERFORATION: Type: IMPLANTABLE DEVICE | Status: FUNCTIONAL

## 2023-04-11 DEVICE — CANNULA ARTERIAL CURVED TIP 20FR X 25CM FLANGE NON-VENTED: Type: IMPLANTABLE DEVICE | Status: FUNCTIONAL

## 2023-04-11 DEVICE — LIGATING CLIPS WECK HORIZON SMALL-WIDE (RED) 24: Type: IMPLANTABLE DEVICE | Status: FUNCTIONAL

## 2023-04-11 DEVICE — CANNULA RCSP 15FR X 12".5" MANUAL-INFLATE CUFF WITH GUIDEWIRE STYLET: Type: IMPLANTABLE DEVICE | Status: FUNCTIONAL

## 2023-04-11 DEVICE — SURGICEL 2 X 14": Type: IMPLANTABLE DEVICE | Status: FUNCTIONAL

## 2023-04-11 DEVICE — MEDIASTINAL CATH DRAIN 9MM: Type: IMPLANTABLE DEVICE | Status: FUNCTIONAL

## 2023-04-11 DEVICE — LIGATING CLIPS WECK HORIZON MEDIUM (BLUE) 24: Type: IMPLANTABLE DEVICE | Status: FUNCTIONAL

## 2023-04-11 DEVICE — KIT A-LINE 1LUM 20G X 12CM SAFE KIT: Type: IMPLANTABLE DEVICE | Status: FUNCTIONAL

## 2023-04-11 DEVICE — FELT PTFE 6 X 6": Type: IMPLANTABLE DEVICE | Status: FUNCTIONAL

## 2023-04-11 DEVICE — BONE WAX 2.5GM: Type: IMPLANTABLE DEVICE | Status: FUNCTIONAL

## 2023-04-11 DEVICE — CANNULA VENOUS 2 STAGE LIGHTHOUSE TIP 28-38FR X 1/2" NON-VENTED: Type: IMPLANTABLE DEVICE | Status: FUNCTIONAL

## 2023-04-11 DEVICE — IMPLANTABLE DEVICE: Type: IMPLANTABLE DEVICE | Status: FUNCTIONAL

## 2023-04-11 DEVICE — PACING WIRE WHITE M-24 BAREWIRE 37MM X 89MM: Type: IMPLANTABLE DEVICE | Status: FUNCTIONAL

## 2023-04-11 RX ORDER — CEFUROXIME AXETIL 250 MG
1500 TABLET ORAL EVERY 8 HOURS
Refills: 0 | Status: COMPLETED | OUTPATIENT
Start: 2023-04-11 | End: 2023-04-13

## 2023-04-11 RX ORDER — DEXTROSE 50 % IN WATER 50 %
50 SYRINGE (ML) INTRAVENOUS
Refills: 0 | Status: DISCONTINUED | OUTPATIENT
Start: 2023-04-11 | End: 2023-04-16

## 2023-04-11 RX ORDER — DEXTROSE 50 % IN WATER 50 %
25 SYRINGE (ML) INTRAVENOUS
Refills: 0 | Status: DISCONTINUED | OUTPATIENT
Start: 2023-04-11 | End: 2023-04-16

## 2023-04-11 RX ORDER — ACETAMINOPHEN 500 MG
1000 TABLET ORAL ONCE
Refills: 0 | Status: COMPLETED | OUTPATIENT
Start: 2023-04-11 | End: 2023-04-11

## 2023-04-11 RX ORDER — ATORVASTATIN CALCIUM 80 MG/1
1 TABLET, FILM COATED ORAL
Qty: 0 | Refills: 0 | DISCHARGE

## 2023-04-11 RX ORDER — SODIUM CHLORIDE 9 MG/ML
3 INJECTION INTRAMUSCULAR; INTRAVENOUS; SUBCUTANEOUS EVERY 8 HOURS
Refills: 0 | Status: DISCONTINUED | OUTPATIENT
Start: 2023-04-11 | End: 2023-04-11

## 2023-04-11 RX ORDER — ACETAMINOPHEN 500 MG
650 TABLET ORAL EVERY 6 HOURS
Refills: 0 | Status: COMPLETED | OUTPATIENT
Start: 2023-04-11 | End: 2023-04-14

## 2023-04-11 RX ORDER — LIDOCAINE HCL 20 MG/ML
0.2 VIAL (ML) INJECTION ONCE
Refills: 0 | Status: DISCONTINUED | OUTPATIENT
Start: 2023-04-11 | End: 2023-04-11

## 2023-04-11 RX ORDER — OMEPRAZOLE 10 MG/1
1 CAPSULE, DELAYED RELEASE ORAL
Refills: 0 | DISCHARGE

## 2023-04-11 RX ORDER — GABAPENTIN 400 MG/1
100 CAPSULE ORAL EVERY 8 HOURS
Refills: 0 | Status: COMPLETED | OUTPATIENT
Start: 2023-04-11 | End: 2023-04-16

## 2023-04-11 RX ORDER — POLYETHYLENE GLYCOL 3350 17 G/17G
17 POWDER, FOR SOLUTION ORAL DAILY
Refills: 0 | Status: DISCONTINUED | OUTPATIENT
Start: 2023-04-12 | End: 2023-04-16

## 2023-04-11 RX ORDER — ALBUMIN HUMAN 25 %
250 VIAL (ML) INTRAVENOUS ONCE
Refills: 0 | Status: COMPLETED | OUTPATIENT
Start: 2023-04-11 | End: 2023-04-11

## 2023-04-11 RX ORDER — SODIUM CHLORIDE 9 MG/ML
500 INJECTION, SOLUTION INTRAVENOUS ONCE
Refills: 0 | Status: COMPLETED | OUTPATIENT
Start: 2023-04-11 | End: 2023-04-11

## 2023-04-11 RX ORDER — GABAPENTIN 400 MG/1
200 CAPSULE ORAL ONCE
Refills: 0 | Status: COMPLETED | OUTPATIENT
Start: 2023-04-11 | End: 2023-04-11

## 2023-04-11 RX ORDER — SUCRALFATE 1 G
1 TABLET ORAL
Qty: 0 | Refills: 0 | DISCHARGE

## 2023-04-11 RX ORDER — KETOROLAC TROMETHAMINE 30 MG/ML
30 SYRINGE (ML) INJECTION EVERY 8 HOURS
Refills: 0 | Status: DISCONTINUED | OUTPATIENT
Start: 2023-04-11 | End: 2023-04-13

## 2023-04-11 RX ORDER — ACETAMINOPHEN 500 MG
1000 TABLET ORAL ONCE
Refills: 0 | Status: DISCONTINUED | OUTPATIENT
Start: 2023-04-11 | End: 2023-04-15

## 2023-04-11 RX ORDER — SENNA PLUS 8.6 MG/1
2 TABLET ORAL AT BEDTIME
Refills: 0 | Status: DISCONTINUED | OUTPATIENT
Start: 2023-04-12 | End: 2023-04-16

## 2023-04-11 RX ORDER — CHLORHEXIDINE GLUCONATE 213 G/1000ML
1 SOLUTION TOPICAL DAILY
Refills: 0 | Status: DISCONTINUED | OUTPATIENT
Start: 2023-04-11 | End: 2023-04-16

## 2023-04-11 RX ORDER — PANTOPRAZOLE SODIUM 20 MG/1
40 TABLET, DELAYED RELEASE ORAL DAILY
Refills: 0 | Status: DISCONTINUED | OUTPATIENT
Start: 2023-04-11 | End: 2023-04-14

## 2023-04-11 RX ORDER — CHLORHEXIDINE GLUCONATE 213 G/1000ML
15 SOLUTION TOPICAL EVERY 12 HOURS
Refills: 0 | Status: DISCONTINUED | OUTPATIENT
Start: 2023-04-11 | End: 2023-04-11

## 2023-04-11 RX ORDER — DEXMEDETOMIDINE HYDROCHLORIDE IN 0.9% SODIUM CHLORIDE 4 UG/ML
0.2 INJECTION INTRAVENOUS
Qty: 200 | Refills: 0 | Status: DISCONTINUED | OUTPATIENT
Start: 2023-04-11 | End: 2023-04-11

## 2023-04-11 RX ORDER — DOBUTAMINE HCL 250MG/20ML
1.5 VIAL (ML) INTRAVENOUS
Qty: 500 | Refills: 0 | Status: DISCONTINUED | OUTPATIENT
Start: 2023-04-11 | End: 2023-04-12

## 2023-04-11 RX ORDER — SODIUM CHLORIDE 9 MG/ML
1000 INJECTION INTRAMUSCULAR; INTRAVENOUS; SUBCUTANEOUS
Refills: 0 | Status: DISCONTINUED | OUTPATIENT
Start: 2023-04-11 | End: 2023-04-16

## 2023-04-11 RX ORDER — AMIODARONE HYDROCHLORIDE 400 MG/1
400 TABLET ORAL
Refills: 0 | Status: COMPLETED | OUTPATIENT
Start: 2023-04-11 | End: 2023-04-14

## 2023-04-11 RX ORDER — ASCORBIC ACID 60 MG
500 TABLET,CHEWABLE ORAL
Refills: 0 | Status: COMPLETED | OUTPATIENT
Start: 2023-04-11 | End: 2023-04-16

## 2023-04-11 RX ORDER — HYDROMORPHONE HYDROCHLORIDE 2 MG/ML
0.5 INJECTION INTRAMUSCULAR; INTRAVENOUS; SUBCUTANEOUS ONCE
Refills: 0 | Status: DISCONTINUED | OUTPATIENT
Start: 2023-04-11 | End: 2023-04-11

## 2023-04-11 RX ORDER — INSULIN HUMAN 100 [IU]/ML
3 INJECTION, SOLUTION SUBCUTANEOUS
Qty: 100 | Refills: 0 | Status: DISCONTINUED | OUTPATIENT
Start: 2023-04-11 | End: 2023-04-16

## 2023-04-11 RX ORDER — POTASSIUM CHLORIDE 20 MEQ
10 PACKET (EA) ORAL
Refills: 0 | Status: DISCONTINUED | OUTPATIENT
Start: 2023-04-11 | End: 2023-04-12

## 2023-04-11 RX ORDER — NICARDIPINE HYDROCHLORIDE 30 MG/1
3 CAPSULE, EXTENDED RELEASE ORAL
Qty: 40 | Refills: 0 | Status: DISCONTINUED | OUTPATIENT
Start: 2023-04-11 | End: 2023-04-14

## 2023-04-11 RX ORDER — ACETAMINOPHEN 500 MG
650 TABLET ORAL EVERY 6 HOURS
Refills: 0 | Status: DISCONTINUED | OUTPATIENT
Start: 2023-04-14 | End: 2023-04-16

## 2023-04-11 RX ADMIN — SODIUM CHLORIDE 500 MILLILITER(S): 9 INJECTION, SOLUTION INTRAVENOUS at 18:37

## 2023-04-11 RX ADMIN — Medication 1000 MILLIGRAM(S): at 07:45

## 2023-04-11 RX ADMIN — Medication 7.93 MICROGRAM(S)/KG/MIN: at 20:00

## 2023-04-11 RX ADMIN — Medication 100 MILLIGRAM(S): at 20:01

## 2023-04-11 RX ADMIN — SODIUM CHLORIDE 10 MILLILITER(S): 9 INJECTION INTRAMUSCULAR; INTRAVENOUS; SUBCUTANEOUS at 18:38

## 2023-04-11 RX ADMIN — Medication 125 MILLILITER(S): at 23:38

## 2023-04-11 RX ADMIN — GABAPENTIN 100 MILLIGRAM(S): 400 CAPSULE ORAL at 21:01

## 2023-04-11 RX ADMIN — GABAPENTIN 200 MILLIGRAM(S): 400 CAPSULE ORAL at 07:50

## 2023-04-11 RX ADMIN — Medication 30 MILLIGRAM(S): at 21:24

## 2023-04-11 RX ADMIN — CHLORHEXIDINE GLUCONATE 15 MILLILITER(S): 213 SOLUTION TOPICAL at 19:18

## 2023-04-11 RX ADMIN — HYDROMORPHONE HYDROCHLORIDE 0.5 MILLIGRAM(S): 2 INJECTION INTRAMUSCULAR; INTRAVENOUS; SUBCUTANEOUS at 19:45

## 2023-04-11 RX ADMIN — Medication 30 MILLIGRAM(S): at 21:45

## 2023-04-11 RX ADMIN — INSULIN HUMAN 3 UNIT(S)/HR: 100 INJECTION, SOLUTION SUBCUTANEOUS at 20:00

## 2023-04-11 RX ADMIN — Medication 1000 MILLIGRAM(S): at 07:48

## 2023-04-11 RX ADMIN — NICARDIPINE HYDROCHLORIDE 15 MG/HR: 30 CAPSULE, EXTENDED RELEASE ORAL at 18:38

## 2023-04-11 RX ADMIN — NICARDIPINE HYDROCHLORIDE 15 MG/HR: 30 CAPSULE, EXTENDED RELEASE ORAL at 20:00

## 2023-04-11 RX ADMIN — HYDROMORPHONE HYDROCHLORIDE 0.5 MILLIGRAM(S): 2 INJECTION INTRAMUSCULAR; INTRAVENOUS; SUBCUTANEOUS at 20:00

## 2023-04-11 RX ADMIN — Medication 650 MILLIGRAM(S): at 23:38

## 2023-04-11 RX ADMIN — Medication 7.93 MICROGRAM(S)/KG/MIN: at 18:38

## 2023-04-11 NOTE — PATIENT PROFILE ADULT - FALL HARM RISK - CONCLUSION
Action 1: Continue Continue Regimen: Halobetasol cream  BID/PRN Detail Level: Zone Plan: Some AKs treated with LN2 today. Will treat those on the face with Fluorouracil 5% topical cream. Pt to return to clinic 10 days into treatment Universal Safety Interventions

## 2023-04-11 NOTE — BRIEF OPERATIVE NOTE - NSEVIDENCEINFORABS_GEN_ALL_CORE
CASE MANAGEMENT DISCHARGE SUMMARY      Discharge to: [facility] 2 completed: [address] Exemption Notification (HENS) completed:n/a     New Durable Medical Equipment ordered/agency: defer to hospice    Transportation:       Medical Transport explained to pt/family. Pt/family voice no agency preference.   none  Agency used: Ambulance   time: 3 PM   Ambulance form completed: Yes    Confirmed discharge plan with: 91 Mireya Huang MD, Sarah Beth Sheffield RN     Patient: yes     Facility/Agency, name:  [facility] has received all orders     RN, name: Sarah Beth Sheffield
CASE MANAGEMENT INITIAL ASSESSMENT      Reviewed chart and completed assessment via telephone with:   Explained Case Management role/services. To patient and daughter Bettye Dennis at bedside    Primary contact information:See below    Health Care Decision Maker :   Primary Decision Maker: Veronique Toribio - Child - [phone]    Secondary Decision Maker: Lyndsey Soni - Child    Supplemental (Other) Decision Maker: Priya Alejandra - Child          Can this person be reached and be able to respond quickly, such as within a few minutes or hours? Yes      Admit date/status:Inpatient 2/26/2021  Diagnosis:Chest Pain  Is this a Readmission?:  No      Insurance:Medicare   Precert required for SNF: No       3 night stay required: Yes    Living arrangements, Adls, care needs, prior to admission:Lives at assisted living at [place] [address] at home:  Walker_x_Cane__RTS__ BSC__Shower Chair__  02__ HHN__ CPAP__  BiPap__  Hospital Bed__ W/C__x_ Other__________    Services in the home and/or outpatient, prior to admission:Lives at an assisted living    PT/OT recs:Not seen    Hospital Exemption Notification (HEN):N/A    Barriers to discharge:None identified    Plan/comments:Patient is from [facility] and plans to return there at discharge. Per daughter he transitioned there about a month ago. He has nursing and aides there and per daughter is on a higher level of care there. The plan is to return when stable. Will follow and assist with discharge back to assisted living when ready.      [facility] on chart for MD signature
Palliative care consult noted. Inpatient consult to hospice ordered. Mary Kerns RN palliative nurse initiated a referral to [facility]. [facility] meeting with family at noon. Anticipate discharge to [place] [id number] if family is agreeable and sign consents.
Received VM from Anabel Toribio at [facility] to say they can accept patient back when ready. He will need a rapid Covid before return. Patient remains agitated - daughter staying  at bedside to avoid restraints. Gabriel placed yesterday by urology D/T inability to void. Will follow.       Jose Reece RN
SNF rec's noted. Patient is from [address] [place] [address]. Left VM for daughter and POA to return writer's call to discuss SNF vs. AL. Patient has speech eval pending. Patient continues to be confused and agitated. On CIWA protocol and getting ativan. Will follow up with family to discuss discharge plan. Not medically stable for discharge at present.
No

## 2023-04-11 NOTE — PRE-ANESTHESIA EVALUATION ADULT - NSANTHOSAYNRD_GEN_A_CORE
-3
-3
No. JOSHUA screening performed.  STOP BANG Legend: 0-2 = LOW Risk; 3-4 = INTERMEDIATE Risk; 5-8 = HIGH Risk

## 2023-04-11 NOTE — AIRWAY REMOVAL NOTE  ADULT & PEDS - ARTIFICAL AIRWAY REMOVAL COMMENTS
Written order for extubation verified. The patient was identified by full name and birth date compared to the identification band. Present during the procedure was HIMANSHU Foster

## 2023-04-11 NOTE — BRIEF OPERATIVE NOTE - OPERATION/FINDINGS
Replacement of ascending aneurysm with 32mm interposition graft  Bypass time: 170 min XClamp time: 110 min  1000 FEIBA, 2 platelet, 1 PRBC  EF normal

## 2023-04-11 NOTE — PROGRESS NOTE ADULT - SUBJECTIVE AND OBJECTIVE BOX
Patient seen and examined at the bedside.    Remained critically ill on continuous ICU monitoring.    OBJECTIVE:  Vital Signs Last 24 Hrs  T(C): 35.1 (11 Apr 2023 16:00), Max: 36.7 (11 Apr 2023 06:49)  T(F): 95.2 (11 Apr 2023 16:00), Max: 98.1 (11 Apr 2023 06:49)  HR: 83 (11 Apr 2023 16:45) (72 - 83)  BP: 137/86 (11 Apr 2023 08:05) (137/86 - 137/86)  BP(mean): --  RR: 20 (11 Apr 2023 16:45) (12 - 20)  SpO2: 97% (11 Apr 2023 16:45) (97% - 100%)    Parameters below as of 11 Apr 2023 15:30    O2 Concentration (%): 60    Physical Exam:   General: Intubated   Neurology: Sedated   Eyes: bilateral pupils equal and reactive   ENT/Neck: Neck supple, trachea midline, No JVD   Respiratory: Clear bilaterally   CV: S1S2, no murmurs        [x] Sternal dressing, [x] Mediastinal CT x2, [x] Bulb        [x] Sinus rhythm  Abdominal: Soft, NT, ND +BS   Extremities: 1-2+ pedal edema noted, + peripheral pulses   Skin: No Rashes, Hematoma, Ecchymosis         ============================I/O===========================   I&O's Detail    11 Apr 2023 07:01  -  11 Apr 2023 17:58  --------------------------------------------------------  IN:    Dexmedetomidine: 33 mL    DOBUTamine: 15.8 mL    Lactated Ringers Bolus: 1000 mL    NiCARdipine: 75 mL    sodium chloride 0.9%: 20 mL  Total IN: 1143.8 mL    OUT:    Bulb (mL): 20 mL    Chest Tube (mL): 120 mL    Indwelling Catheter - Urethral (mL): 1175 mL    Insulin: 0 mL  Total OUT: 1315 mL    Total NET: -171.2 mL  ============================ LABS =========================                        13.5   10.17 )-----------( 128      ( 11 Apr 2023 15:28 )             39.4     04-11    143  |  107  |  13  ----------------------------<  140<H>  4.3   |  22  |  0.97    Ca    8.2<L>      11 Apr 2023 15:30  Phos  2.7     04-11  Mg     2.9     04-11    TPro  4.9<L>  /  Alb  3.1<L>  /  TBili  1.3<H>  /  DBili  x   /  AST  50<H>  /  ALT  23  /  AlkPhos  55  04-11    LIVER FUNCTIONS - ( 11 Apr 2023 15:30 )  Alb: 3.1 g/dL / Pro: 4.9 g/dL / ALK PHOS: 55 U/L / ALT: 23 U/L / AST: 50 U/L / GGT: x           PT/INR - ( 11 Apr 2023 15:28 )   PT: 11.3 sec;   INR: 0.97 ratio         PTT - ( 11 Apr 2023 15:28 )  PTT:29.8 sec  ABG - ( 11 Apr 2023 16:38 )  pH, Arterial: 7.35  pH, Blood: x     /  pCO2: 48    /  pO2: 96    / HCO3: 26    / Base Excess: 0.2   /  SaO2: 98.5    ======================Micro/Rad/Cardio=================  Culture: Reviewed   CXR: Reviewed  Echo: Reviewed  ======================================================  PAST MEDICAL & SURGICAL HISTORY:  HTN (hypertension)    CAD (coronary artery disease)  coronary artery stenting 2017 (LAD x1 stent)    COPD without exacerbation  uses symbicort PRN    Thoracic aortic aneurysm  diagnosed 4 years ago (4.2 no change per patient, last CT 2021- Dr. Cope)  states was evaluated by Dr. Lancaster and advised no need for surgery, just monitoring.    H/O unstable angina  prompted cardiac cath in 2017  stent x 1 LAD    Current smoker  1 cigar twice a week   smoking started in 20's    Lung nodule    Liver nodule    History of cholecystectomy    GERD (gastroesophageal reflux disease)    Aspiration pneumonia  April 2021    Fatty liver    Thyroid nodule    Sepsis with acute renal failure    HLD (hyperlipidemia)    Arthritis    Kidney stone    Thoracic aortic aneurysm    S/P hernia repair  bilateral    H/O umbilical hernia repair    History of bowel resection    History of cholecystectomy    H/O inguinal hernia repair    Implantable loop recorder present    S/P colonoscopy    History of tonsillectomy    Stented coronary artery  ======================================================  Assessment:  67 year old male presents to PST prior to scheduled Ascending Aortic Replacement with Dr. Parra on 4/11/2023.     Thoracic aortic aneurysm s/p Ascending aortic aneurysm repair 04/11/23  Hemodynamic instability   Hypovolemia   Stress hyperglycemia   Encounter ventilator management  Thrombocytopenia   ======================================================  Plan:   ***Neuro***  [x] Sedated with [x] Precedex   Post operative neuro assessment     ***Cardiovascular***  Invasive hemodynamic monitoring, assess perfusion indices   SR / CVP 3/ MAP 73/ Hct 39.4%/ Lactate 1.5  [x] Cardene- 3mg/hr [x] Dobutamine- 3mcgs   Reassessment of hemodynamics post resuscitation   Amiodarone for afib prophylaxis   Monitor chest tube outputs  Serial EKG and cardiac enzymes     ***Pulmonary***  Post op vent management   Titration of FiO2 and PEEP, follow SpO2, CXR, blood gasses     Mode: AC/ CMV (Assist Control/ Continuous Mandatory Ventilation)  RR (machine): 12  TV (machine): 600  FiO2: 60  PEEP: 7  ITime: 1  MAP: 11  PIP: 26              ***GI***  [x] NPO   [x] Protonix     ***Renal***  Continue to monitor I/Os, BUN/Creatinine.   Replete lytes PRN  Yaniv present     ***ID***  Perioperative coverage with Cefuroxime     ***Endocrine***  [x] Stress Hyperglycemia: HbA1c 6.3%                - [x] Insulin gtt             - Need tight glycemic control to prevent wound infection.        Patient requires continuous monitoring with:  bedside rhythm monitoring, arterial line, pulse oximetry, ventilator management and monitoring; intermittent blood gas analysis.  Care plan discussed with ICU care team.  patient remain critical; required more than usual post op care; I have spent 50 minutes providing non routine post op care, revaluated multiple times during the day .     Care Plan discussed with the ICU care team. Patient remained critical, at risk for life threatening decompensation.     By signing my name below, I, Dorothea Velazquez, attest that this documentation has been prepared under the direction and in the presence of Bryant Kim MD.  Electronically signed: Jo Cantu, 04-11-23 @ 17:58    I, Julio Hurtado, personally performed the services described in this documentation. all medical record entries made by the scribe were at my direction and in my presence. I have reviewed the chart and agree that the record reflects my personal performance and is accurate and complete  Electronically signed: Bryant Kim MD. Patient seen and examined at the bedside.    Remained critically ill on continuous ICU monitoring.    OBJECTIVE:  Vital Signs Last 24 Hrs  T(C): 35.1 (11 Apr 2023 16:00), Max: 36.7 (11 Apr 2023 06:49)  T(F): 95.2 (11 Apr 2023 16:00), Max: 98.1 (11 Apr 2023 06:49)  HR: 83 (11 Apr 2023 16:45) (72 - 83)  BP: 137/86 (11 Apr 2023 08:05) (137/86 - 137/86)  BP(mean): --  RR: 20 (11 Apr 2023 16:45) (12 - 20)  SpO2: 97% (11 Apr 2023 16:45) (97% - 100%)    Parameters below as of 11 Apr 2023 15:30    O2 Concentration (%): 60    Physical Exam:   General: Intubated   Neurology: Sedated   Eyes: bilateral pupils equal and reactive   ENT/Neck: Neck supple, trachea midline, No JVD   Respiratory: Clear bilaterally   CV: S1S2, no murmurs        [x] Sternal dressing, [x] Mediastinal CT x2, [x] Bulb        [x] Sinus rhythm  Abdominal: Soft, NT, ND +BS   Extremities: 1-2+ pedal edema noted, + peripheral pulses   Skin: No Rashes, Hematoma, Ecchymosis         ============================I/O===========================   I&O's Detail    11 Apr 2023 07:01  -  11 Apr 2023 17:58  --------------------------------------------------------  IN:    Dexmedetomidine: 33 mL    DOBUTamine: 15.8 mL    Lactated Ringers Bolus: 1000 mL    NiCARdipine: 75 mL    sodium chloride 0.9%: 20 mL  Total IN: 1143.8 mL    OUT:    Bulb (mL): 20 mL    Chest Tube (mL): 120 mL    Indwelling Catheter - Urethral (mL): 1175 mL    Insulin: 0 mL  Total OUT: 1315 mL    Total NET: -171.2 mL  ============================ LABS =========================                        13.5   10.17 )-----------( 128      ( 11 Apr 2023 15:28 )             39.4     04-11    143  |  107  |  13  ----------------------------<  140<H>  4.3   |  22  |  0.97    Ca    8.2<L>      11 Apr 2023 15:30  Phos  2.7     04-11  Mg     2.9     04-11    TPro  4.9<L>  /  Alb  3.1<L>  /  TBili  1.3<H>  /  DBili  x   /  AST  50<H>  /  ALT  23  /  AlkPhos  55  04-11    LIVER FUNCTIONS - ( 11 Apr 2023 15:30 )  Alb: 3.1 g/dL / Pro: 4.9 g/dL / ALK PHOS: 55 U/L / ALT: 23 U/L / AST: 50 U/L / GGT: x           PT/INR - ( 11 Apr 2023 15:28 )   PT: 11.3 sec;   INR: 0.97 ratio         PTT - ( 11 Apr 2023 15:28 )  PTT:29.8 sec  ABG - ( 11 Apr 2023 16:38 )  pH, Arterial: 7.35  pH, Blood: x     /  pCO2: 48    /  pO2: 96    / HCO3: 26    / Base Excess: 0.2   /  SaO2: 98.5    ======================Micro/Rad/Cardio=================  Culture: Reviewed   CXR: Reviewed  Echo: Reviewed  ======================================================  PAST MEDICAL & SURGICAL HISTORY:  HTN (hypertension)    CAD (coronary artery disease)  coronary artery stenting 2017 (LAD x1 stent)    COPD without exacerbation  uses symbicort PRN    Thoracic aortic aneurysm  diagnosed 4 years ago (4.2 no change per patient, last CT 2021- Dr. Cope)  states was evaluated by Dr. Lancaster and advised no need for surgery, just monitoring.    H/O unstable angina  prompted cardiac cath in 2017  stent x 1 LAD    Current smoker  1 cigar twice a week   smoking started in 20's    Lung nodule    Liver nodule    History of cholecystectomy    GERD (gastroesophageal reflux disease)    Aspiration pneumonia  April 2021    Fatty liver    Thyroid nodule    Sepsis with acute renal failure    HLD (hyperlipidemia)    Arthritis    Kidney stone    Thoracic aortic aneurysm    S/P hernia repair  bilateral    H/O umbilical hernia repair    History of bowel resection    History of cholecystectomy    H/O inguinal hernia repair    Implantable loop recorder present    S/P colonoscopy    History of tonsillectomy    Stented coronary artery  ======================================================  Assessment:  67 year old male presents to PST prior to scheduled Ascending Aortic Replacement with Dr. Parra on 4/11/2023.     Thoracic aortic aneurysm s/p Ascending aortic aneurysm repair 04/11/23  Hemodynamic instability   Hypovolemia   Stress hyperglycemia   Encounter ventilator management weaning from respiratror  Thrombocytopenia   ======================================================  Plan:   ***Neuro***  [x] Sedated with [x] Precedex   Post operative neuro assessment     ***Cardiovascular***  Invasive hemodynamic monitoring, assess perfusion indices   SR / CVP 3/ MAP 73/ Hct 39.4%/ Lactate 1.5  [x] Cardene- 3mg/hr [x] Dobutamine- 3mcgs   Reassessment of hemodynamics post resuscitation   Amiodarone for afib prophylaxis   Monitor chest tube outputs  Serial EKG and cardiac enzymes     ***Pulmonary***  Post op vent management   Titration of FiO2 and PEEP, follow SpO2, CXR, blood gasses     Mode: AC/ CMV (Assist Control/ Continuous Mandatory Ventilation)  RR (machine): 12  TV (machine): 600  FiO2: 60  PEEP: 7  ITime: 1  MAP: 11  PIP: 26          weaning  from ventilator , and extubation, monitor respiratory status    ***GI***  [x] NPO   [x] Protonix     ***Renal***  fluid bolus 2 liters  Continue to monitor I/Os, BUN/Creatinine.   Replete lytes PRN  Purvis present     ***ID***  Perioperative coverage with Cefuroxime     ***Endocrine***  [x] Stress Hyperglycemia: HbA1c 6.3%                - [x] Insulin gtt             - Need tight glycemic control to prevent wound infection.        Patient requires continuous monitoring with:  bedside rhythm monitoring, arterial line, pulse oximetry, ventilator management and monitoring; intermittent blood gas analysis.  Care plan discussed with ICU care team.  patient remain critical; required more than usual post op care; I have spent 50 minutes providing non routine post op care, revaluated multiple times during the day .     Care Plan discussed with the ICU care team. Patient remained critical, at risk for life threatening decompensation.     By signing my name below, I, Dorothea Velazquez, attest that this documentation has been prepared under the direction and in the presence of Bryant Kim MD.  Electronically signed: Jo Cantu, 04-11-23 @ 17:58    I, Julio Hurtado, personally performed the services described in this documentation. all medical record entries made by the scribe were at my direction and in my presence. I have reviewed the chart and agree that the record reflects my personal performance and is accurate and complete  Electronically signed: Bryant Kim MD.

## 2023-04-11 NOTE — PRE-ANESTHESIA EVALUATION ADULT - NSANTHPMHFT_GEN_ALL_CORE
67 year old male presents for scheduled Ascending Aortic Replacement with Dr. Parra on 4/11/2023. PMhx Current smoker (20 pack years), COPD, CAD, Dilated aortic root, HTN, HLD, GERD, Thyroid nodule, Lung Nodule, Liver Nodule, Fatty Liver, kidney stones, colon polyps s/p colon resection. PShx Colon resection (Nov. 2021), Appendectomy, umbilical hernia repair, b/l inguinal hernia repair, tonsillectomy, Loop recorder, Coronary stents. C/o ongoing epigastric intermittent pain unrelated to activity. Current smoker

## 2023-04-12 DIAGNOSIS — Z98.890 OTHER SPECIFIED POSTPROCEDURAL STATES: ICD-10-CM

## 2023-04-12 LAB
ALBUMIN SERPL ELPH-MCNC: 3.9 G/DL — SIGNIFICANT CHANGE UP (ref 3.3–5)
ALP SERPL-CCNC: 56 U/L — SIGNIFICANT CHANGE UP (ref 40–120)
ALT FLD-CCNC: 27 U/L — SIGNIFICANT CHANGE UP (ref 10–45)
ANION GAP SERPL CALC-SCNC: 11 MMOL/L — SIGNIFICANT CHANGE UP (ref 5–17)
AST SERPL-CCNC: 53 U/L — HIGH (ref 10–40)
BASOPHILS # BLD AUTO: 0.02 K/UL — SIGNIFICANT CHANGE UP (ref 0–0.2)
BASOPHILS NFR BLD AUTO: 0.2 % — SIGNIFICANT CHANGE UP (ref 0–2)
BILIRUB SERPL-MCNC: 1.4 MG/DL — HIGH (ref 0.2–1.2)
BUN SERPL-MCNC: 13 MG/DL — SIGNIFICANT CHANGE UP (ref 7–23)
CALCIUM SERPL-MCNC: 8.4 MG/DL — SIGNIFICANT CHANGE UP (ref 8.4–10.5)
CHLORIDE SERPL-SCNC: 104 MMOL/L — SIGNIFICANT CHANGE UP (ref 96–108)
CO2 SERPL-SCNC: 26 MMOL/L — SIGNIFICANT CHANGE UP (ref 22–31)
CREAT SERPL-MCNC: 0.93 MG/DL — SIGNIFICANT CHANGE UP (ref 0.5–1.3)
EGFR: 90 ML/MIN/1.73M2 — SIGNIFICANT CHANGE UP
EOSINOPHIL # BLD AUTO: 0 K/UL — SIGNIFICANT CHANGE UP (ref 0–0.5)
EOSINOPHIL NFR BLD AUTO: 0 % — SIGNIFICANT CHANGE UP (ref 0–6)
GAS PNL BLDA: SIGNIFICANT CHANGE UP
GAS PNL BLDA: SIGNIFICANT CHANGE UP
GLUCOSE SERPL-MCNC: 125 MG/DL — HIGH (ref 70–99)
HCT VFR BLD CALC: 38 % — LOW (ref 39–50)
HGB BLD-MCNC: 13.2 G/DL — SIGNIFICANT CHANGE UP (ref 13–17)
IMM GRANULOCYTES NFR BLD AUTO: 0.5 % — SIGNIFICANT CHANGE UP (ref 0–0.9)
LYMPHOCYTES # BLD AUTO: 0.63 K/UL — LOW (ref 1–3.3)
LYMPHOCYTES # BLD AUTO: 6.1 % — LOW (ref 13–44)
MAGNESIUM SERPL-MCNC: 2.2 MG/DL — SIGNIFICANT CHANGE UP (ref 1.6–2.6)
MCHC RBC-ENTMCNC: 30.3 PG — SIGNIFICANT CHANGE UP (ref 27–34)
MCHC RBC-ENTMCNC: 34.7 GM/DL — SIGNIFICANT CHANGE UP (ref 32–36)
MCV RBC AUTO: 87.4 FL — SIGNIFICANT CHANGE UP (ref 80–100)
MONOCYTES # BLD AUTO: 0.72 K/UL — SIGNIFICANT CHANGE UP (ref 0–0.9)
MONOCYTES NFR BLD AUTO: 7 % — SIGNIFICANT CHANGE UP (ref 2–14)
NEUTROPHILS # BLD AUTO: 8.84 K/UL — HIGH (ref 1.8–7.4)
NEUTROPHILS NFR BLD AUTO: 86.2 % — HIGH (ref 43–77)
NRBC # BLD: 0 /100 WBCS — SIGNIFICANT CHANGE UP (ref 0–0)
PHOSPHATE SERPL-MCNC: 3.2 MG/DL — SIGNIFICANT CHANGE UP (ref 2.5–4.5)
PLATELET # BLD AUTO: 129 K/UL — LOW (ref 150–400)
POTASSIUM SERPL-MCNC: 4 MMOL/L — SIGNIFICANT CHANGE UP (ref 3.5–5.3)
POTASSIUM SERPL-SCNC: 4 MMOL/L — SIGNIFICANT CHANGE UP (ref 3.5–5.3)
PROT SERPL-MCNC: 5.4 G/DL — LOW (ref 6–8.3)
RBC # BLD: 4.35 M/UL — SIGNIFICANT CHANGE UP (ref 4.2–5.8)
RBC # FLD: 12.7 % — SIGNIFICANT CHANGE UP (ref 10.3–14.5)
SODIUM SERPL-SCNC: 141 MMOL/L — SIGNIFICANT CHANGE UP (ref 135–145)
WBC # BLD: 10.26 K/UL — SIGNIFICANT CHANGE UP (ref 3.8–10.5)
WBC # FLD AUTO: 10.26 K/UL — SIGNIFICANT CHANGE UP (ref 3.8–10.5)

## 2023-04-12 PROCEDURE — 71045 X-RAY EXAM CHEST 1 VIEW: CPT | Mod: 26

## 2023-04-12 PROCEDURE — 93010 ELECTROCARDIOGRAM REPORT: CPT

## 2023-04-12 PROCEDURE — 99291 CRITICAL CARE FIRST HOUR: CPT

## 2023-04-12 RX ORDER — METOPROLOL TARTRATE 50 MG
25 TABLET ORAL
Refills: 0 | Status: DISCONTINUED | OUTPATIENT
Start: 2023-04-12 | End: 2023-04-13

## 2023-04-12 RX ORDER — HYDROMORPHONE HYDROCHLORIDE 2 MG/ML
4 INJECTION INTRAMUSCULAR; INTRAVENOUS; SUBCUTANEOUS EVERY 6 HOURS
Refills: 0 | Status: DISCONTINUED | OUTPATIENT
Start: 2023-04-12 | End: 2023-04-15

## 2023-04-12 RX ORDER — INSULIN LISPRO 100/ML
VIAL (ML) SUBCUTANEOUS AT BEDTIME
Refills: 0 | Status: DISCONTINUED | OUTPATIENT
Start: 2023-04-12 | End: 2023-04-16

## 2023-04-12 RX ORDER — HYDROMORPHONE HYDROCHLORIDE 2 MG/ML
0.5 INJECTION INTRAMUSCULAR; INTRAVENOUS; SUBCUTANEOUS EVERY 6 HOURS
Refills: 0 | Status: DISCONTINUED | OUTPATIENT
Start: 2023-04-12 | End: 2023-04-14

## 2023-04-12 RX ORDER — SODIUM CHLORIDE 9 MG/ML
1000 INJECTION, SOLUTION INTRAVENOUS
Refills: 0 | Status: DISCONTINUED | OUTPATIENT
Start: 2023-04-12 | End: 2023-04-16

## 2023-04-12 RX ORDER — GLUCAGON INJECTION, SOLUTION 0.5 MG/.1ML
1 INJECTION, SOLUTION SUBCUTANEOUS ONCE
Refills: 0 | Status: DISCONTINUED | OUTPATIENT
Start: 2023-04-12 | End: 2023-04-16

## 2023-04-12 RX ORDER — HYDROMORPHONE HYDROCHLORIDE 2 MG/ML
0.5 INJECTION INTRAMUSCULAR; INTRAVENOUS; SUBCUTANEOUS ONCE
Refills: 0 | Status: DISCONTINUED | OUTPATIENT
Start: 2023-04-12 | End: 2023-04-12

## 2023-04-12 RX ORDER — CALCIUM GLUCONATE 100 MG/ML
2 VIAL (ML) INTRAVENOUS ONCE
Refills: 0 | Status: COMPLETED | OUTPATIENT
Start: 2023-04-12 | End: 2023-04-12

## 2023-04-12 RX ORDER — DEXTROSE 50 % IN WATER 50 %
15 SYRINGE (ML) INTRAVENOUS ONCE
Refills: 0 | Status: DISCONTINUED | OUTPATIENT
Start: 2023-04-12 | End: 2023-04-16

## 2023-04-12 RX ORDER — HYDROMORPHONE HYDROCHLORIDE 2 MG/ML
2 INJECTION INTRAMUSCULAR; INTRAVENOUS; SUBCUTANEOUS EVERY 6 HOURS
Refills: 0 | Status: DISCONTINUED | OUTPATIENT
Start: 2023-04-12 | End: 2023-04-16

## 2023-04-12 RX ORDER — MAGNESIUM SULFATE 500 MG/ML
2 VIAL (ML) INJECTION ONCE
Refills: 0 | Status: COMPLETED | OUTPATIENT
Start: 2023-04-12 | End: 2023-04-12

## 2023-04-12 RX ORDER — INSULIN LISPRO 100/ML
VIAL (ML) SUBCUTANEOUS
Refills: 0 | Status: DISCONTINUED | OUTPATIENT
Start: 2023-04-12 | End: 2023-04-16

## 2023-04-12 RX ORDER — ENOXAPARIN SODIUM 100 MG/ML
40 INJECTION SUBCUTANEOUS EVERY 24 HOURS
Refills: 0 | Status: DISCONTINUED | OUTPATIENT
Start: 2023-04-12 | End: 2023-04-16

## 2023-04-12 RX ORDER — ASPIRIN/CALCIUM CARB/MAGNESIUM 324 MG
81 TABLET ORAL DAILY
Refills: 0 | Status: DISCONTINUED | OUTPATIENT
Start: 2023-04-12 | End: 2023-04-16

## 2023-04-12 RX ORDER — ATORVASTATIN CALCIUM 80 MG/1
80 TABLET, FILM COATED ORAL AT BEDTIME
Refills: 0 | Status: DISCONTINUED | OUTPATIENT
Start: 2023-04-12 | End: 2023-04-16

## 2023-04-12 RX ORDER — ALBUMIN HUMAN 25 %
250 VIAL (ML) INTRAVENOUS ONCE
Refills: 0 | Status: COMPLETED | OUTPATIENT
Start: 2023-04-12 | End: 2023-04-12

## 2023-04-12 RX ORDER — POTASSIUM CHLORIDE 20 MEQ
10 PACKET (EA) ORAL
Refills: 0 | Status: COMPLETED | OUTPATIENT
Start: 2023-04-12 | End: 2023-04-12

## 2023-04-12 RX ORDER — POTASSIUM CHLORIDE 20 MEQ
10 PACKET (EA) ORAL ONCE
Refills: 0 | Status: COMPLETED | OUTPATIENT
Start: 2023-04-12 | End: 2023-04-12

## 2023-04-12 RX ADMIN — GABAPENTIN 100 MILLIGRAM(S): 400 CAPSULE ORAL at 21:11

## 2023-04-12 RX ADMIN — PANTOPRAZOLE SODIUM 40 MILLIGRAM(S): 20 TABLET, DELAYED RELEASE ORAL at 11:08

## 2023-04-12 RX ADMIN — Medication 25 GRAM(S): at 06:00

## 2023-04-12 RX ADMIN — Medication 100 MILLIEQUIVALENT(S): at 01:00

## 2023-04-12 RX ADMIN — Medication 100 MILLIGRAM(S): at 03:03

## 2023-04-12 RX ADMIN — Medication 100 MILLIEQUIVALENT(S): at 01:59

## 2023-04-12 RX ADMIN — Medication 30 MILLIGRAM(S): at 13:54

## 2023-04-12 RX ADMIN — Medication 500 MILLIGRAM(S): at 17:56

## 2023-04-12 RX ADMIN — Medication 50 MILLIEQUIVALENT(S): at 06:00

## 2023-04-12 RX ADMIN — Medication 650 MILLIGRAM(S): at 05:06

## 2023-04-12 RX ADMIN — HYDROMORPHONE HYDROCHLORIDE 0.5 MILLIGRAM(S): 2 INJECTION INTRAMUSCULAR; INTRAVENOUS; SUBCUTANEOUS at 04:45

## 2023-04-12 RX ADMIN — Medication 650 MILLIGRAM(S): at 11:38

## 2023-04-12 RX ADMIN — AMIODARONE HYDROCHLORIDE 400 MILLIGRAM(S): 400 TABLET ORAL at 05:07

## 2023-04-12 RX ADMIN — AMIODARONE HYDROCHLORIDE 400 MILLIGRAM(S): 400 TABLET ORAL at 17:56

## 2023-04-12 RX ADMIN — Medication 1: at 11:15

## 2023-04-12 RX ADMIN — Medication 100 MILLIGRAM(S): at 13:55

## 2023-04-12 RX ADMIN — GABAPENTIN 100 MILLIGRAM(S): 400 CAPSULE ORAL at 13:55

## 2023-04-12 RX ADMIN — Medication 200 GRAM(S): at 07:57

## 2023-04-12 RX ADMIN — HYDROMORPHONE HYDROCHLORIDE 4 MILLIGRAM(S): 2 INJECTION INTRAMUSCULAR; INTRAVENOUS; SUBCUTANEOUS at 19:04

## 2023-04-12 RX ADMIN — ENOXAPARIN SODIUM 40 MILLIGRAM(S): 100 INJECTION SUBCUTANEOUS at 11:08

## 2023-04-12 RX ADMIN — GABAPENTIN 100 MILLIGRAM(S): 400 CAPSULE ORAL at 05:07

## 2023-04-12 RX ADMIN — Medication 650 MILLIGRAM(S): at 11:08

## 2023-04-12 RX ADMIN — Medication 500 MILLIGRAM(S): at 05:07

## 2023-04-12 RX ADMIN — Medication 650 MILLIGRAM(S): at 00:00

## 2023-04-12 RX ADMIN — HYDROMORPHONE HYDROCHLORIDE 4 MILLIGRAM(S): 2 INJECTION INTRAMUSCULAR; INTRAVENOUS; SUBCUTANEOUS at 18:34

## 2023-04-12 RX ADMIN — ATORVASTATIN CALCIUM 80 MILLIGRAM(S): 80 TABLET, FILM COATED ORAL at 21:11

## 2023-04-12 RX ADMIN — Medication 650 MILLIGRAM(S): at 17:56

## 2023-04-12 RX ADMIN — Medication 30 MILLIGRAM(S): at 22:15

## 2023-04-12 RX ADMIN — HYDROMORPHONE HYDROCHLORIDE 0.5 MILLIGRAM(S): 2 INJECTION INTRAMUSCULAR; INTRAVENOUS; SUBCUTANEOUS at 05:00

## 2023-04-12 RX ADMIN — Medication 81 MILLIGRAM(S): at 11:08

## 2023-04-12 RX ADMIN — Medication 30 MILLIGRAM(S): at 21:11

## 2023-04-12 RX ADMIN — Medication 30 MILLIGRAM(S): at 05:55

## 2023-04-12 RX ADMIN — Medication 125 MILLILITER(S): at 07:57

## 2023-04-12 RX ADMIN — Medication 30 MILLIGRAM(S): at 05:07

## 2023-04-12 RX ADMIN — SENNA PLUS 2 TABLET(S): 8.6 TABLET ORAL at 21:09

## 2023-04-12 RX ADMIN — Medication 650 MILLIGRAM(S): at 05:54

## 2023-04-12 RX ADMIN — CHLORHEXIDINE GLUCONATE 1 APPLICATION(S): 213 SOLUTION TOPICAL at 11:15

## 2023-04-12 RX ADMIN — Medication 25 MILLIGRAM(S): at 12:36

## 2023-04-12 RX ADMIN — Medication 100 MILLIGRAM(S): at 21:09

## 2023-04-12 NOTE — OCCUPATIONAL THERAPY INITIAL EVALUATION ADULT - DIAGNOSIS, OT EVAL
Patient is functionally independent, no skilled OT intervention is needed. Pt educated on ADL modification and energy conservation due to sternal precautions.

## 2023-04-12 NOTE — PROGRESS NOTE ADULT - SUBJECTIVE AND OBJECTIVE BOX
Patient seen and examined at the bedside.    Remained critically ill on continuous ICU monitoring.      Brief Summary:  66 yo M with PMHx COPD, CAD, Dilated aortic root, HTN, HLD, GERD, Thyroid nodule, Lung Nodule, Liver Nodule, Fatty Liver, kidney stones, colon polyps s/p colon resection, Thoracic aortic aneurysm s/p Ascending aortic aneurysm repair 04/11/23      24 Hour events:  - Taken to the OR for  Ascending aortic aneurysm repair 04/11/23  - Drips: Dobutamine 1.5, Cardene 3  - Echo: 4/11 Intra-op NARA: EF 55-60%, Normal biventricular function.  - Blood Products: 1U PRBC, 2 Platelets, 1000 FEIBA received intra-operatively      OBJECTIVE:  Vital Signs Last 24 Hrs  T(C): 37.3 (12 Apr 2023 04:00), Max: 37.7 (11 Apr 2023 20:00)  T(F): 99.1 (12 Apr 2023 04:00), Max: 99.9 (11 Apr 2023 20:00)  HR: 80 (12 Apr 2023 06:00) (72 - 97)  BP: 137/86 (11 Apr 2023 08:05) (137/86 - 137/86)  BP(mean): --  RR: 18 (12 Apr 2023 06:00) (12 - 29)  SpO2: 97% (12 Apr 2023 06:00) (95% - 100%)    Parameters below as of 12 Apr 2023 04:00  Patient On (Oxygen Delivery Method): nasal cannula  O2 Flow (L/min): 2      Mode: CPAP with PS  FiO2: 50  PEEP: 5  PS: 5  ITime: 1  MAP: 7  PIP: 11              Physical Exam:   General: Intubated   Neurology: Sedated   Eyes: bilateral pupils equal and reactive   ENT/Neck: Neck supple, trachea midline, No JVD   Respiratory: on Nasal cannula  CV: S1S2, no murmurs        [x] Sternal dressing, [x] Mediastinal CT x2, [x] Bulb        [x] Sinus rhythm  Abdominal: Soft, NT, ND +BS   Extremities: 1-2+ pedal edema noted, + peripheral pulses   Skin: No Rashes, Hematoma, Ecchymosis   -------------------------------------------------------------------------------------------------------------------------------    Labs:                        13.2   10.26 )-----------( 129      ( 12 Apr 2023 00:42 )             38.0     04-12    141  |  104  |  13  ----------------------------<  125<H>  4.0   |  26  |  0.93    Ca    8.4      12 Apr 2023 00:42  Phos  3.2     04-12  Mg     2.2     04-12    TPro  5.4<L>  /  Alb  3.9  /  TBili  1.4<H>  /  DBili  x   /  AST  53<H>  /  ALT  27  /  AlkPhos  56  04-12    LIVER FUNCTIONS - ( 12 Apr 2023 00:42 )  Alb: 3.9 g/dL / Pro: 5.4 g/dL / ALK PHOS: 56 U/L / ALT: 27 U/L / AST: 53 U/L / GGT: x           PT/INR - ( 11 Apr 2023 15:28 )   PT: 11.3 sec;   INR: 0.97 ratio         PTT - ( 11 Apr 2023 15:28 )  PTT:29.8 sec  ABG - ( 12 Apr 2023 04:50 )  pH, Arterial: 7.47  pH, Blood: x     /  pCO2: 32    /  pO2: 93    / HCO3: 23    / Base Excess: 0.3   /  SaO2: 98.3              ------------------------------------------------------------------------------------------------------------------------------  Assessment:  67 year old male PMhx Current smoker (20 pack years), COPD, CAD, Dilated aortic root, HTN, HLD, GERD, Thyroid nodule, Lung Nodule, Liver Nodule, Fatty Liver, kidney stones, colon polyps s/p colon resection. PShx Colon resection (Nov. 2021), Appendectomy, umbilical hernia repair, b/l inguinal hernia repair, tonsillectomy, Loop recorder, Coronary stents. Presents to PST prior to scheduled Ascending Aortic Replacement with Dr. Parra on 4/11/2023.     Thoracic aortic aneurysm s/p Ascending aortic aneurysm repair 04/11/23  Hemodynamic instability   Hypovolemia   Stress hyperglycemia   Thrombocytopenia           Plan:   ***Neuro***  - Postoperative acute pain control with Tylenol, Gabapentin and Ketorolac    ***Cardiovascular***  - Invasive hemodynamic monitoring, assess perfusion indices   - At high risk for hemodynamic instability and cardiac arrhythmias.  - IVF for perioperative hypovolemia.  - IV Dobutamine infusion for inotropic support   - Amiodarone for afib prophylaxis   - Cardene for afterload reduction.  - Monitor chest tube output.  - ASA/ Statin daily.      ***Pulmonary***  - Postoperative acute respiratory insufficiency - NC  - Deep breathing and coughing exercises.  - Wean oxygen as able.      ***GI***  - Tolerating diet.  - Protonix for stress ulcer prophylaxis     ***Renal***  - Purvis catheter for strict I/O measurements.   - Replete electrolytes as indicated.      ***ID***  - Cefuroxime for perioperative antibiotic coverage     ***Endocrine***  - Stress Hyperglycemia  - Insulin as needed to maintain euglycemia.     ***Hematology***  - Acute blood loss anemia and thrombocytopenia - 1U PRBC, 2 Platelets, 1000 FEIBA received intra-operatively  - At risk for Bleeding             Patient requires continuous monitoring with bedside rhythm monitoring, pulse oximetry monitoring, and continuous central venous and arterial pressure monitoring; and intermittent blood gas analysis. Care plan discussed with the ICU care team.   Patient remained critical, at risk for life threatening decompensation.    I have spent 30 minutes providing critical care management to this patient.    By signing my name below, I, Luis Harman, attest that this documentation has been prepared under the direction and in the presence of Renetta Cleary DO  Electronically signed: Luis Harman, 04-12-23 @ 06:05    I, Renetta Cleary DO, personally performed the services described in this documentation. all medical record entries made by the scribe were at my direction and in my presence. I have reviewed the chart and agree that the record reflects my personal performance and is accurate and complete  Electronically signed: Renetta Cleary DO, Patient seen and examined at the bedside.    Remained critically ill on continuous ICU monitoring.      Brief Summary:  68 yo M with PMHx COPD, CAD, Dilated aortic root, HTN, HLD, GERD, Thyroid nodule, Lung Nodule, Liver Nodule, Fatty Liver, kidney stones, colon polyps s/p colon resection, Thoracic aortic aneurysm s/p Ascending aortic aneurysm repair 04/11/23      24 Hour events:  - Taken to the OR for  Ascending aortic aneurysm repair 04/11/23  - Drips: Dobutamine 1.5, Cardene 3  - Echo: 4/11 Intra-op NARA: EF 55-60%, Normal biventricular function.  - Blood Products: 1U PRBC, 2 Platelets, 1000 FEIBA received intra-operatively  - Box off  - Albumin given this AM  - Start beta blocker if tolerates      OBJECTIVE:  Vital Signs Last 24 Hrs  T(C): 37.3 (12 Apr 2023 04:00), Max: 37.7 (11 Apr 2023 20:00)  T(F): 99.1 (12 Apr 2023 04:00), Max: 99.9 (11 Apr 2023 20:00)  HR: 80 (12 Apr 2023 06:00) (72 - 97)  BP: 137/86 (11 Apr 2023 08:05) (137/86 - 137/86)  BP(mean): --  RR: 18 (12 Apr 2023 06:00) (12 - 29)  SpO2: 97% (12 Apr 2023 06:00) (95% - 100%)    Parameters below as of 12 Apr 2023 04:00  Patient On (Oxygen Delivery Method): nasal cannula  O2 Flow (L/min): 2      Mode: CPAP with PS  FiO2: 50  PEEP: 5  PS: 5  ITime: 1  MAP: 7  PIP: 11              Physical Exam:   General: Intubated   Neurology: Sedated   Eyes: bilateral pupils equal and reactive   ENT/Neck: Neck supple, trachea midline, No JVD   Respiratory: on Nasal cannula  CV: S1S2, no murmurs        [x] Sternal dressing, [x] Mediastinal CT x2, [x] Bulb        [x] Sinus rhythm  Abdominal: Soft, NT, ND +BS   Extremities: 1-2+ pedal edema noted, + peripheral pulses   Skin: No Rashes, Hematoma, Ecchymosis   -------------------------------------------------------------------------------------------------------------------------------    Labs:                        13.2   10.26 )-----------( 129      ( 12 Apr 2023 00:42 )             38.0     04-12    141  |  104  |  13  ----------------------------<  125<H>  4.0   |  26  |  0.93    Ca    8.4      12 Apr 2023 00:42  Phos  3.2     04-12  Mg     2.2     04-12    TPro  5.4<L>  /  Alb  3.9  /  TBili  1.4<H>  /  DBili  x   /  AST  53<H>  /  ALT  27  /  AlkPhos  56  04-12    LIVER FUNCTIONS - ( 12 Apr 2023 00:42 )  Alb: 3.9 g/dL / Pro: 5.4 g/dL / ALK PHOS: 56 U/L / ALT: 27 U/L / AST: 53 U/L / GGT: x           PT/INR - ( 11 Apr 2023 15:28 )   PT: 11.3 sec;   INR: 0.97 ratio         PTT - ( 11 Apr 2023 15:28 )  PTT:29.8 sec  ABG - ( 12 Apr 2023 04:50 )  pH, Arterial: 7.47  pH, Blood: x     /  pCO2: 32    /  pO2: 93    / HCO3: 23    / Base Excess: 0.3   /  SaO2: 98.3              ------------------------------------------------------------------------------------------------------------------------------  Assessment:  67 year old male PMhx Current smoker (20 pack years), COPD, CAD, Dilated aortic root, HTN, HLD, GERD, Thyroid nodule, Lung Nodule, Liver Nodule, Fatty Liver, kidney stones, colon polyps s/p colon resection. PShx Colon resection (Nov. 2021), Appendectomy, umbilical hernia repair, b/l inguinal hernia repair, tonsillectomy, Loop recorder, Coronary stents. Presents to PST prior to scheduled Ascending Aortic Replacement with Dr. Parra on 4/11/2023.     Thoracic aortic aneurysm s/p Ascending aortic aneurysm repair 04/11/23  Hemodynamic instability   Hypovolemia   Stress hyperglycemia   Thrombocytopenia           Plan:   ***Neuro***  - Postoperative acute pain control with Tylenol, Gabapentin and Ketorolac    ***Cardiovascular***  - Invasive hemodynamic monitoring, assess perfusion indices   - At high risk for hemodynamic instability and cardiac arrhythmias.  - IVF for perioperative hypovolemia.  - IV Dobutamine infusion for inotropic support   - Amiodarone for afib prophylaxis   - Cardene for afterload reduction.  - Start beta blocker if tolerates  - Monitor chest tube output.  - ASA/ Statin daily.      ***Pulmonary***  - Postoperative acute respiratory insufficiency - NC  - Deep breathing and coughing exercises.  - Wean oxygen as able.      ***GI***  - Tolerating diet.  - Protonix for stress ulcer prophylaxis     ***Renal***  - Purvis catheter for strict I/O measurements.   - Replete electrolytes as indicated.      ***ID***  - Cefuroxime for perioperative antibiotic coverage     ***Endocrine***  - Stress Hyperglycemia  - Insulin as needed to maintain euglycemia.     ***Hematology***  - Acute blood loss anemia and thrombocytopenia - 1U PRBC, 2 Platelets, 1000 FEIBA received intra-operatively  - At risk for Bleeding             Patient requires continuous monitoring with bedside rhythm monitoring, pulse oximetry monitoring, and continuous central venous and arterial pressure monitoring; and intermittent blood gas analysis. Care plan discussed with the ICU care team.   Patient remained critical, at risk for life threatening decompensation.    I have spent 30 minutes providing critical care management to this patient.    By signing my name below, I, Luis Harman, attest that this documentation has been prepared under the direction and in the presence of Renetta Cleary DO  Electronically signed: Luis Harman, 04-12-23 @ 06:05    I, Renetta Cleary DO, personally performed the services described in this documentation. all medical record entries made by the scribe were at my direction and in my presence. I have reviewed the chart and agree that the record reflects my personal performance and is accurate and complete  Electronically signed: Renetta Cleary DO, Patient seen and examined at the bedside.    Remained critically ill on continuous ICU monitoring.      Brief Summary:  66 yo M with PMHx COPD, CAD, HTN, HLD, GERD, colon polyps s/p colon resection, thoracic aortic aneurysm s/p ascending aortic aneurysm replacement on 04/11/23.      24 Hour events:  - Taken to the OR for ascending aortic aneurysm replacement on 04/11/23  - Intra-op NARA: EF 55-60%, Normal biventricular function.    - Extubated overnight.  - OOBTC this AM.  - on nasal cannula.      OBJECTIVE:  Vital Signs Last 24 Hrs  T(C): 37.3 (12 Apr 2023 04:00), Max: 37.7 (11 Apr 2023 20:00)  T(F): 99.1 (12 Apr 2023 04:00), Max: 99.9 (11 Apr 2023 20:00)  HR: 80 (12 Apr 2023 06:00) (72 - 97)  BP: 137/86 (11 Apr 2023 08:05) (137/86 - 137/86)  BP(mean): --  RR: 18 (12 Apr 2023 06:00) (12 - 29)  SpO2: 97% (12 Apr 2023 06:00) (95% - 100%)    Parameters below as of 12 Apr 2023 04:00  Patient On (Oxygen Delivery Method): nasal cannula  O2 Flow (L/min): 2    Physical Exam:   General: sitting in chair  Neurology: intact  Eyes: bilateral pupils equal and reactive   ENT/Neck: Neck supple, trachea midline  Respiratory: on nasal cannula, non-labored respirations  CV: RRR, sinus rhythm, no murmurs        [x] Sternal dressing, [x] Mediastinal CT x2, [x] Bulb  Abdominal: Soft, ND  Extremities: 1-2+ pedal edema noted, + peripheral pulses, moving all extremities  Skin: No Rashes, Hematoma, Ecchymosis   -------------------------------------------------------------------------------------------------------------------------------    Labs:                        13.2   10.26 )-----------( 129      ( 12 Apr 2023 00:42 )             38.0     04-12    141  |  104  |  13  ----------------------------<  125<H>  4.0   |  26  |  0.93    Ca    8.4      12 Apr 2023 00:42  Phos  3.2     04-12  Mg     2.2     04-12    TPro  5.4<L>  /  Alb  3.9  /  TBili  1.4<H>  /  DBili  x   /  AST  53<H>  /  ALT  27  /  AlkPhos  56  04-12    LIVER FUNCTIONS - ( 12 Apr 2023 00:42 )  Alb: 3.9 g/dL / Pro: 5.4 g/dL / ALK PHOS: 56 U/L / ALT: 27 U/L / AST: 53 U/L / GGT: x           PT/INR - ( 11 Apr 2023 15:28 )   PT: 11.3 sec;   INR: 0.97 ratio         PTT - ( 11 Apr 2023 15:28 )  PTT:29.8 sec  ABG - ( 12 Apr 2023 04:50 )  pH, Arterial: 7.47  pH, Blood: x     /  pCO2: 32    /  pO2: 93    / HCO3: 23    / Base Excess: 0.3   /  SaO2: 98.3          ------------------------------------------------------------------------------------------------------------------------------  Assessment:  66 yo M with PMHx COPD, CAD, HTN, HLD, GERD, colon polyps s/p colon resection, thoracic aortic aneurysm s/p ascending aortic aneurysm replacement on 04/11/23.    Thoracic aortic aneurysm s/p ascending aortic aneurysm replacement 04/11/23  Post-operative pain  At risk for hemodynamic instability and cardiac arrhythmias  Acute post-operative respiratory insufficiency  Stress hyperglycemia   Thrombocytopenia       Plan:   ***Neuro***  - Postoperative acute pain control with Tylenol, Gabapentin and Ketorolac.  - Optimize sleep/wake cycles.  - PT/OT.    ***Cardiovascular***  - Invasive hemodynamic monitoring, assess perfusion indices   - At risk for hemodynamic instability and cardiac arrhythmias.  - Amiodarone for afib prophylaxis   - Likely start BB today.  - Monitor chest tube output.  - ASA/Statin daily.    ***Pulmonary***  - Postoperative acute respiratory insufficiency  - Deep breathing and coughing exercises.  - Wean oxygen as able.  - IS.    ***GI***  - Tolerating diet.  - Protonix for stress ulcer prophylaxis     ***Renal***  - Purvis catheter for strict I/O measurements.   - Replete electrolytes as indicated.    ***ID***  - Cefuroxime for perioperative antibiotic coverage     ***Endocrine***  - Stress Hyperglycemia  - Insulin as needed to maintain euglycemia.     ***Hematology***  - Thrombocytopenia - no current indication for transfusion.  - Lovenox        Patient requires continuous monitoring with bedside rhythm monitoring, pulse oximetry monitoring, and continuous central venous and arterial pressure monitoring; and intermittent blood gas analysis. Care plan discussed with the ICU care team.   Patient remained critical, at risk for life threatening decompensation.    I have spent 45 minutes providing critical care management to this patient.    By signing my name below, I, Luis Lawrencepal, attest that this documentation has been prepared under the direction and in the presence of Renetta Cleary DO  Electronically signed: Luis Harman, 04-12-23 @ 06:05    I, Renetta Cleary DO, personally performed the services described in this documentation. all medical record entries made by the scribe were at my direction and in my presence. I have reviewed the chart and agree that the record reflects my personal performance and is accurate and complete  Electronically signed: Renetta Cleary DO,

## 2023-04-12 NOTE — OCCUPATIONAL THERAPY INITIAL EVALUATION ADULT - PERTINENT HX OF CURRENT PROBLEM, REHAB EVAL
67 year old male presents to PST prior to scheduled Ascending Aortic Replacement with Dr. Parra on 4/11/2023. PMhx Current smoker (20 pack years), COPD, CAD, Dilated aortic root, HTN, HLD, GERD, Thyroid nodule, Lung Nodule, Liver Nodule, Fatty Liver, kidney stones, colon polyps s/p colon resection. PShx Colon resection (Nov. 2021), Appendectomy, umbilical hernia repair, b/l inguinal hernia repair, tonsillectomy, Loop recorder, Coronary stents. C/o ongoing epigastric intermittent pain unrelated to activity. Current smoker but has been weaning his cigarette use significantly. s/p Ascending aortic aneurysm repair 4/11

## 2023-04-12 NOTE — PHYSICAL THERAPY INITIAL EVALUATION ADULT - LIVES WITH, PROFILE
private home with no steps to enter & no stairs inside. Pt was independent, driving & working prior to admit./alone

## 2023-04-12 NOTE — PROGRESS NOTE ADULT - SUBJECTIVE AND OBJECTIVE BOX
VITAL SIGNS    Telemetry:      Vital Signs Last 24 Hrs  T(C): 37.1 (04-12-23 @ 13:28), Max: 37.7 (04-11-23 @ 20:00)  T(F): 98.7 (04-12-23 @ 13:28), Max: 99.9 (04-11-23 @ 20:00)  HR: 76 (04-12-23 @ 13:28) (76 - 97)  BP: 132/71 (04-12-23 @ 13:28) (132/71 - 132/71)  RR: 18 (04-12-23 @ 13:28) (10 - 30)  SpO2: 97% (04-12-23 @ 13:28) (95% - 100%)                   04-11 @ 07:01  -  04-12 @ 07:00  --------------------------------------------------------  IN: 3723.2 mL / OUT: 3807 mL / NET: -83.8 mL    04-12 @ 07:01  -  04-12 @ 14:18  --------------------------------------------------------  IN: 910 mL / OUT: 1045 mL / NET: -135 mL          Daily     Daily             CAPILLARY BLOOD GLUCOSE  165 (12 Apr 2023 08:00)  169 (11 Apr 2023 19:00)  137 (11 Apr 2023 17:00)      POCT Blood Glucose.: 157 mg/dL (12 Apr 2023 11:06)  POCT Blood Glucose.: 165 mg/dL (12 Apr 2023 08:05)  POCT Blood Glucose.: 144 mg/dL (12 Apr 2023 06:50)  POCT Blood Glucose.: 135 mg/dL (12 Apr 2023 04:50)  POCT Blood Glucose.: 132 mg/dL (12 Apr 2023 03:53)  POCT Blood Glucose.: 99 mg/dL (12 Apr 2023 02:44)  POCT Blood Glucose.: 112 mg/dL (12 Apr 2023 01:54)  POCT Blood Glucose.: 127 mg/dL (12 Apr 2023 00:47)  POCT Blood Glucose.: 123 mg/dL (11 Apr 2023 23:54)  POCT Blood Glucose.: 131 mg/dL (11 Apr 2023 22:45)  POCT Blood Glucose.: 148 mg/dL (11 Apr 2023 21:52)  POCT Blood Glucose.: 160 mg/dL (11 Apr 2023 20:49)  POCT Blood Glucose.: 159 mg/dL (11 Apr 2023 20:20)  POCT Blood Glucose.: 169 mg/dL (11 Apr 2023 18:57)  POCT Blood Glucose.: 137 mg/dL (11 Apr 2023 16:51)            Drains:     MS         [  ] Drainage:                 L Pleural  [  ]  Drainage:                R Pleural  [  ]  Drainage:    Pacing Wires        [  ]   Settings:                                  Isolated  [  ]    Coumadin    [ ] YES          [  ]      NO                                   PHYSICAL EXAM        Neurology: alert and oriented x 3, nonfocal, no gross deficits  CV : s1 s2 RRR  Sternal Wound :  CDI , Stable  Lungs: cta  Abdomen: soft, nontender, nondistended, positive bowel sounds, last bowel movement                       chest tubes  :    voiding / fang - sbd         Extremities:      edema   /  -   calve tenderness ,    L leg  /  R leg  incisions cdi          acetaminophen     Tablet .. 650 milliGRAM(s) Oral every 6 hours  acetaminophen   IVPB .. 1000 milliGRAM(s) IV Intermittent once  aMIOdarone    Tablet 400 milliGRAM(s) Oral two times a day  ascorbic acid 500 milliGRAM(s) Oral two times a day  aspirin  chewable 81 milliGRAM(s) Oral daily  atorvastatin 80 milliGRAM(s) Oral at bedtime  cefuroxime  IVPB 1500 milliGRAM(s) IV Intermittent every 8 hours  cefuroxime  IVPB 1500 milliGRAM(s) IV Intermittent once  chlorhexidine 2% Cloths 1 Application(s) Topical daily  dextrose 5%. 1000 milliLiter(s) IV Continuous <Continuous>  dextrose 5%. 1000 milliLiter(s) IV Continuous <Continuous>  dextrose 50% Injectable 50 milliLiter(s) IV Push every 15 minutes  dextrose 50% Injectable 25 milliLiter(s) IV Push every 15 minutes  dextrose Oral Gel 15 Gram(s) Oral once PRN  enoxaparin Injectable 40 milliGRAM(s) SubCutaneous every 24 hours  gabapentin 100 milliGRAM(s) Oral every 8 hours  glucagon  Injectable 1 milliGRAM(s) IntraMuscular once  HYDROmorphone   Tablet 2 milliGRAM(s) Oral every 6 hours PRN  HYDROmorphone   Tablet 4 milliGRAM(s) Oral every 6 hours PRN  HYDROmorphone  Injectable 0.5 milliGRAM(s) IV Push every 6 hours PRN  insulin lispro (ADMELOG) corrective regimen sliding scale   SubCutaneous at bedtime  insulin lispro (ADMELOG) corrective regimen sliding scale   SubCutaneous three times a day before meals  insulin regular Infusion 3 Unit(s)/Hr IV Continuous <Continuous>  ketorolac   Injectable 30 milliGRAM(s) IV Push every 8 hours  metoprolol tartrate 25 milliGRAM(s) Oral two times a day  niCARdipine Infusion 3 mG/Hr IV Continuous <Continuous>  pantoprazole  Injectable 40 milliGRAM(s) IV Push daily  polyethylene glycol 3350 17 Gram(s) Oral daily  senna 2 Tablet(s) Oral at bedtime  sodium chloride 0.9%. 1000 milliLiter(s) IV Continuous <Continuous>                    Physical Therapy Rec:   Home  [  ]   Home w/ PT  [  ]  Rehab  [  ]  Discussed with Cardiothoracic Team at AM rounds.     VITAL SIGNS    Telemetry:  nsr 74    Vital Signs Last 24 Hrs  T(C): 37.1 (04-12-23 @ 13:28), Max: 37.7 (04-11-23 @ 20:00)  T(F): 98.7 (04-12-23 @ 13:28), Max: 99.9 (04-11-23 @ 20:00)  HR: 76 (04-12-23 @ 13:28) (76 - 97)  BP: 132/71 (04-12-23 @ 13:28) (132/71 - 132/71)  RR: 18 (04-12-23 @ 13:28) (10 - 30)  SpO2: 97% (04-12-23 @ 13:28) (95% - 100%)                   04-11 @ 07:01  -  04-12 @ 07:00  --------------------------------------------------------  IN: 3723.2 mL / OUT: 3807 mL / NET: -83.8 mL    04-12 @ 07:01  -  04-12 @ 14:18  --------------------------------------------------------  IN: 910 mL / OUT: 1045 mL / NET: -135 mL          Daily     Daily             CAPILLARY BLOOD GLUCOSE  165 (12 Apr 2023 08:00)  169 (11 Apr 2023 19:00)  137 (11 Apr 2023 17:00)      POCT Blood Glucose.: 157 mg/dL (12 Apr 2023 11:06)  POCT Blood Glucose.: 165 mg/dL (12 Apr 2023 08:05)  POCT Blood Glucose.: 144 mg/dL (12 Apr 2023 06:50)  POCT Blood Glucose.: 135 mg/dL (12 Apr 2023 04:50)  POCT Blood Glucose.: 132 mg/dL (12 Apr 2023 03:53)  POCT Blood Glucose.: 99 mg/dL (12 Apr 2023 02:44)  POCT Blood Glucose.: 112 mg/dL (12 Apr 2023 01:54)  POCT Blood Glucose.: 127 mg/dL (12 Apr 2023 00:47)  POCT Blood Glucose.: 123 mg/dL (11 Apr 2023 23:54)  POCT Blood Glucose.: 131 mg/dL (11 Apr 2023 22:45)  POCT Blood Glucose.: 148 mg/dL (11 Apr 2023 21:52)  POCT Blood Glucose.: 160 mg/dL (11 Apr 2023 20:49)  POCT Blood Glucose.: 159 mg/dL (11 Apr 2023 20:20)  POCT Blood Glucose.: 169 mg/dL (11 Apr 2023 18:57)  POCT Blood Glucose.: 137 mg/dL (11 Apr 2023 16:51)            Drains:     MS         [ x ] Drainage:     Bulb            Pacing Wires        [x  ]   Settings:                                  Isolated  [  ]    Coumadin    [ ] YES          [x  ]      NO                                   PHYSICAL EXAM        Neurology: alert and oriented x 3, nonfocal, no gross deficits  CV : s1 s2 RRR  R ij  R rad a line  Sternal Wound :  CDI , Stable  Lungs: cta  Abdomen: soft, nontender, nondistended, positive bowel sounds, last bowel movement                       chest tubes x 1  :    dtv       Extremities:      -edema   /  -   calve tenderness ,              acetaminophen     Tablet .. 650 milliGRAM(s) Oral every 6 hours  acetaminophen   IVPB .. 1000 milliGRAM(s) IV Intermittent once  aMIOdarone    Tablet 400 milliGRAM(s) Oral two times a day  ascorbic acid 500 milliGRAM(s) Oral two times a day  aspirin  chewable 81 milliGRAM(s) Oral daily  atorvastatin 80 milliGRAM(s) Oral at bedtime  cefuroxime  IVPB 1500 milliGRAM(s) IV Intermittent every 8 hours  cefuroxime  IVPB 1500 milliGRAM(s) IV Intermittent once  chlorhexidine 2% Cloths 1 Application(s) Topical daily  dextrose 5%. 1000 milliLiter(s) IV Continuous <Continuous>  dextrose 5%. 1000 milliLiter(s) IV Continuous <Continuous>  dextrose 50% Injectable 50 milliLiter(s) IV Push every 15 minutes  dextrose 50% Injectable 25 milliLiter(s) IV Push every 15 minutes  dextrose Oral Gel 15 Gram(s) Oral once PRN  enoxaparin Injectable 40 milliGRAM(s) SubCutaneous every 24 hours  gabapentin 100 milliGRAM(s) Oral every 8 hours  glucagon  Injectable 1 milliGRAM(s) IntraMuscular once  HYDROmorphone   Tablet 2 milliGRAM(s) Oral every 6 hours PRN  HYDROmorphone   Tablet 4 milliGRAM(s) Oral every 6 hours PRN  HYDROmorphone  Injectable 0.5 milliGRAM(s) IV Push every 6 hours PRN  insulin lispro (ADMELOG) corrective regimen sliding scale   SubCutaneous at bedtime  insulin lispro (ADMELOG) corrective regimen sliding scale   SubCutaneous three times a day before meals  insulin regular Infusion 3 Unit(s)/Hr IV Continuous <Continuous>  ketorolac   Injectable 30 milliGRAM(s) IV Push every 8 hours  metoprolol tartrate 25 milliGRAM(s) Oral two times a day  niCARdipine Infusion 3 mG/Hr IV Continuous <Continuous>  pantoprazole  Injectable 40 milliGRAM(s) IV Push daily  polyethylene glycol 3350 17 Gram(s) Oral daily  senna 2 Tablet(s) Oral at bedtime  sodium chloride 0.9%. 1000 milliLiter(s) IV Continuous <Continuous>                    Physical Therapy Rec:   Home  [  ]   Home w/ PT  [  ]  Rehab  [  ]  Discussed with Cardiothoracic Team at AM rounds.

## 2023-04-12 NOTE — PHYSICAL THERAPY INITIAL EVALUATION ADULT - GENERAL OBSERVATIONS, REHAB EVAL
Chart reviewed events to date noted. Blood glucose reviewed. Pt tolerated 45min PT initial evaluation well. Rec'd in chair in NAD, POD1 AAA. +tele, ex-defib, a-line.

## 2023-04-13 ENCOUNTER — TRANSCRIPTION ENCOUNTER (OUTPATIENT)
Age: 68
End: 2023-04-13

## 2023-04-13 LAB
ALBUMIN SERPL ELPH-MCNC: 3.7 G/DL — SIGNIFICANT CHANGE UP (ref 3.3–5)
ALP SERPL-CCNC: 59 U/L — SIGNIFICANT CHANGE UP (ref 40–120)
ALT FLD-CCNC: 18 U/L — SIGNIFICANT CHANGE UP (ref 10–45)
ANION GAP SERPL CALC-SCNC: 10 MMOL/L — SIGNIFICANT CHANGE UP (ref 5–17)
AST SERPL-CCNC: 23 U/L — SIGNIFICANT CHANGE UP (ref 10–40)
BASOPHILS # BLD AUTO: 0.02 K/UL — SIGNIFICANT CHANGE UP (ref 0–0.2)
BASOPHILS NFR BLD AUTO: 0.2 % — SIGNIFICANT CHANGE UP (ref 0–2)
BILIRUB SERPL-MCNC: 0.9 MG/DL — SIGNIFICANT CHANGE UP (ref 0.2–1.2)
BUN SERPL-MCNC: 20 MG/DL — SIGNIFICANT CHANGE UP (ref 7–23)
CALCIUM SERPL-MCNC: 8.6 MG/DL — SIGNIFICANT CHANGE UP (ref 8.4–10.5)
CHLORIDE SERPL-SCNC: 105 MMOL/L — SIGNIFICANT CHANGE UP (ref 96–108)
CO2 SERPL-SCNC: 25 MMOL/L — SIGNIFICANT CHANGE UP (ref 22–31)
CREAT SERPL-MCNC: 1.02 MG/DL — SIGNIFICANT CHANGE UP (ref 0.5–1.3)
EGFR: 81 ML/MIN/1.73M2 — SIGNIFICANT CHANGE UP
EOSINOPHIL # BLD AUTO: 0.23 K/UL — SIGNIFICANT CHANGE UP (ref 0–0.5)
EOSINOPHIL NFR BLD AUTO: 2.4 % — SIGNIFICANT CHANGE UP (ref 0–6)
GLUCOSE SERPL-MCNC: 112 MG/DL — HIGH (ref 70–99)
HCT VFR BLD CALC: 36.4 % — LOW (ref 39–50)
HGB BLD-MCNC: 12.1 G/DL — LOW (ref 13–17)
IMM GRANULOCYTES NFR BLD AUTO: 0.4 % — SIGNIFICANT CHANGE UP (ref 0–0.9)
LYMPHOCYTES # BLD AUTO: 1.12 K/UL — SIGNIFICANT CHANGE UP (ref 1–3.3)
LYMPHOCYTES # BLD AUTO: 11.7 % — LOW (ref 13–44)
MCHC RBC-ENTMCNC: 30.1 PG — SIGNIFICANT CHANGE UP (ref 27–34)
MCHC RBC-ENTMCNC: 33.2 GM/DL — SIGNIFICANT CHANGE UP (ref 32–36)
MCV RBC AUTO: 90.5 FL — SIGNIFICANT CHANGE UP (ref 80–100)
MONOCYTES # BLD AUTO: 0.92 K/UL — HIGH (ref 0–0.9)
MONOCYTES NFR BLD AUTO: 9.6 % — SIGNIFICANT CHANGE UP (ref 2–14)
NEUTROPHILS # BLD AUTO: 7.28 K/UL — SIGNIFICANT CHANGE UP (ref 1.8–7.4)
NEUTROPHILS NFR BLD AUTO: 75.7 % — SIGNIFICANT CHANGE UP (ref 43–77)
NRBC # BLD: 0 /100 WBCS — SIGNIFICANT CHANGE UP (ref 0–0)
PLATELET # BLD AUTO: 111 K/UL — LOW (ref 150–400)
POTASSIUM SERPL-MCNC: 3.8 MMOL/L — SIGNIFICANT CHANGE UP (ref 3.5–5.3)
POTASSIUM SERPL-SCNC: 3.8 MMOL/L — SIGNIFICANT CHANGE UP (ref 3.5–5.3)
PROT SERPL-MCNC: 5.6 G/DL — LOW (ref 6–8.3)
RBC # BLD: 4.02 M/UL — LOW (ref 4.2–5.8)
RBC # FLD: 13.1 % — SIGNIFICANT CHANGE UP (ref 10.3–14.5)
SODIUM SERPL-SCNC: 140 MMOL/L — SIGNIFICANT CHANGE UP (ref 135–145)
WBC # BLD: 9.61 K/UL — SIGNIFICANT CHANGE UP (ref 3.8–10.5)
WBC # FLD AUTO: 9.61 K/UL — SIGNIFICANT CHANGE UP (ref 3.8–10.5)

## 2023-04-13 PROCEDURE — 71045 X-RAY EXAM CHEST 1 VIEW: CPT | Mod: 26

## 2023-04-13 RX ORDER — SPIRONOLACTONE 25 MG/1
25 TABLET, FILM COATED ORAL DAILY
Refills: 0 | Status: DISCONTINUED | OUTPATIENT
Start: 2023-04-13 | End: 2023-04-16

## 2023-04-13 RX ORDER — METOPROLOL TARTRATE 50 MG
50 TABLET ORAL EVERY 12 HOURS
Refills: 0 | Status: DISCONTINUED | OUTPATIENT
Start: 2023-04-13 | End: 2023-04-13

## 2023-04-13 RX ORDER — POTASSIUM BICARBONATE 978 MG/1
25 TABLET, EFFERVESCENT ORAL
Refills: 0 | Status: COMPLETED | OUTPATIENT
Start: 2023-04-13 | End: 2023-04-13

## 2023-04-13 RX ORDER — METOPROLOL TARTRATE 50 MG
50 TABLET ORAL EVERY 8 HOURS
Refills: 0 | Status: DISCONTINUED | OUTPATIENT
Start: 2023-04-13 | End: 2023-04-15

## 2023-04-13 RX ADMIN — POTASSIUM BICARBONATE 25 MILLIEQUIVALENT(S): 978 TABLET, EFFERVESCENT ORAL at 12:00

## 2023-04-13 RX ADMIN — Medication 50 MILLIGRAM(S): at 21:41

## 2023-04-13 RX ADMIN — Medication 650 MILLIGRAM(S): at 22:30

## 2023-04-13 RX ADMIN — CHLORHEXIDINE GLUCONATE 1 APPLICATION(S): 213 SOLUTION TOPICAL at 08:45

## 2023-04-13 RX ADMIN — Medication 50 MILLIGRAM(S): at 06:17

## 2023-04-13 RX ADMIN — GABAPENTIN 100 MILLIGRAM(S): 400 CAPSULE ORAL at 14:12

## 2023-04-13 RX ADMIN — AMIODARONE HYDROCHLORIDE 400 MILLIGRAM(S): 400 TABLET ORAL at 06:16

## 2023-04-13 RX ADMIN — GABAPENTIN 100 MILLIGRAM(S): 400 CAPSULE ORAL at 21:41

## 2023-04-13 RX ADMIN — Medication 650 MILLIGRAM(S): at 01:44

## 2023-04-13 RX ADMIN — Medication 650 MILLIGRAM(S): at 00:44

## 2023-04-13 RX ADMIN — SPIRONOLACTONE 25 MILLIGRAM(S): 25 TABLET, FILM COATED ORAL at 12:00

## 2023-04-13 RX ADMIN — Medication 50 MILLIGRAM(S): at 14:13

## 2023-04-13 RX ADMIN — GABAPENTIN 100 MILLIGRAM(S): 400 CAPSULE ORAL at 06:16

## 2023-04-13 RX ADMIN — Medication 650 MILLIGRAM(S): at 06:20

## 2023-04-13 RX ADMIN — Medication 650 MILLIGRAM(S): at 21:41

## 2023-04-13 RX ADMIN — Medication 30 MILLIGRAM(S): at 06:20

## 2023-04-13 RX ADMIN — PANTOPRAZOLE SODIUM 40 MILLIGRAM(S): 20 TABLET, DELAYED RELEASE ORAL at 12:00

## 2023-04-13 RX ADMIN — Medication 81 MILLIGRAM(S): at 12:01

## 2023-04-13 RX ADMIN — Medication 30 MILLIGRAM(S): at 06:18

## 2023-04-13 RX ADMIN — ATORVASTATIN CALCIUM 80 MILLIGRAM(S): 80 TABLET, FILM COATED ORAL at 21:41

## 2023-04-13 RX ADMIN — ENOXAPARIN SODIUM 40 MILLIGRAM(S): 100 INJECTION SUBCUTANEOUS at 12:00

## 2023-04-13 RX ADMIN — POTASSIUM BICARBONATE 25 MILLIEQUIVALENT(S): 978 TABLET, EFFERVESCENT ORAL at 14:12

## 2023-04-13 RX ADMIN — Medication 500 MILLIGRAM(S): at 17:28

## 2023-04-13 RX ADMIN — AMIODARONE HYDROCHLORIDE 400 MILLIGRAM(S): 400 TABLET ORAL at 17:29

## 2023-04-13 RX ADMIN — Medication 650 MILLIGRAM(S): at 06:15

## 2023-04-13 RX ADMIN — Medication 10 MILLIGRAM(S): at 12:00

## 2023-04-13 RX ADMIN — Medication 100 MILLIGRAM(S): at 06:19

## 2023-04-13 RX ADMIN — Medication 500 MILLIGRAM(S): at 06:16

## 2023-04-13 NOTE — DISCHARGE NOTE NURSING/CASE MANAGEMENT/SOCIAL WORK - PATIENT PORTAL LINK FT
You can access the FollowMyHealth Patient Portal offered by [facility] by registering at the following website: [email or web address]. By joining [facility]’s FollowMyHealth portal, you will also be able to view your health information using other applications (apps) compatible with our system.

## 2023-04-13 NOTE — DISCHARGE NOTE NURSING/CASE MANAGEMENT/SOCIAL WORK - NSDCPEEMAIL_GEN_ALL_CORE
[facility] for Tobacco Control email [email or web address]

## 2023-04-13 NOTE — PROGRESS NOTE ADULT - SUBJECTIVE AND OBJECTIVE BOX
VITAL SIGNS    Telemetry:  nsr 76    Vital Signs Last 24 Hrs  T(C): 36.9 (23 @ 06:57), Max: 37.2 (23 @ 12:00)  T(F): 98.4 (23 @ 06:57), Max: 99 (23 @ 12:00)  HR: 74 (23 @ 06:57) (73 - 84)  BP: 146/69 (23 @ 06:57) (116/70 - 146/69)  RR: 18 (23 @ 06:57) (17 - 28)  SpO2: 94% (23 @ 06:57) (93% - 99%)                    @ 07:01  -   @ 07:00  --------------------------------------------------------  IN: 1310 mL / OUT: 1745 mL / NET: -435 mL     @ 07:01  -   @ 10:55  --------------------------------------------------------  IN: 0 mL / OUT: 450 mL / NET: -450 mL          Daily     Daily Weight in k.8 (2023 06:50)            CAPILLARY BLOOD GLUCOSE      POCT Blood Glucose.: 122 mg/dL (2023 07:09)  POCT Blood Glucose.: 138 mg/dL (2023 20:50)  POCT Blood Glucose.: 113 mg/dL (2023 16:32)  POCT Blood Glucose.: 157 mg/dL (2023 11:06)            Drains:     joana drain    Pacing Wires        [  ]   Settings:                                  Isolated  [x  ]    Coumadin    [ ] YES          [x  ]      NO                                   PHYSICAL EXAM        Neurology: alert and oriented x 3, nonfocal, no gross deficits  CV : s1 s2 RRR  r ij  a ines   Sternal Wound :  CDI , Stable  Lungs: cta  Abdomen: soft, nontender, nondistended, positive bowel sounds, last bowel movement-                     bulb to SS  :    voiding          Extremities:    -  edema   /  -   calve tenderness ,             acetaminophen     Tablet .. 650 milliGRAM(s) Oral every 6 hours  acetaminophen   IVPB .. 1000 milliGRAM(s) IV Intermittent once  aMIOdarone    Tablet 400 milliGRAM(s) Oral two times a day  ascorbic acid 500 milliGRAM(s) Oral two times a day  aspirin  chewable 81 milliGRAM(s) Oral daily  atorvastatin 80 milliGRAM(s) Oral at bedtime  chlorhexidine 2% Cloths 1 Application(s) Topical daily  dextrose 5%. 1000 milliLiter(s) IV Continuous <Continuous>  dextrose 5%. 1000 milliLiter(s) IV Continuous <Continuous>  dextrose 50% Injectable 50 milliLiter(s) IV Push every 15 minutes  dextrose 50% Injectable 25 milliLiter(s) IV Push every 15 minutes  dextrose Oral Gel 15 Gram(s) Oral once PRN  enoxaparin Injectable 40 milliGRAM(s) SubCutaneous every 24 hours  gabapentin 100 milliGRAM(s) Oral every 8 hours  glucagon  Injectable 1 milliGRAM(s) IntraMuscular once  HYDROmorphone   Tablet 2 milliGRAM(s) Oral every 6 hours PRN  HYDROmorphone   Tablet 4 milliGRAM(s) Oral every 6 hours PRN  HYDROmorphone  Injectable 0.5 milliGRAM(s) IV Push every 6 hours PRN  insulin lispro (ADMELOG) corrective regimen sliding scale   SubCutaneous three times a day before meals  insulin lispro (ADMELOG) corrective regimen sliding scale   SubCutaneous at bedtime  insulin regular Infusion 3 Unit(s)/Hr IV Continuous <Continuous>  ketorolac   Injectable 30 milliGRAM(s) IV Push every 8 hours  metoprolol tartrate 50 milliGRAM(s) Oral every 8 hours  niCARdipine Infusion 3 mG/Hr IV Continuous <Continuous>  pantoprazole  Injectable 40 milliGRAM(s) IV Push daily  polyethylene glycol 3350 17 Gram(s) Oral daily  potassium bicarbonate/potassium citrate 25 milliEquivalent(s) Oral every 1 hour  senna 2 Tablet(s) Oral at bedtime  sodium chloride 0.9%. 1000 milliLiter(s) IV Continuous <Continuous>  spironolactone 25 milliGRAM(s) Oral daily  torsemide 10 milliGRAM(s) Oral daily                    Physical Therapy Rec:   Home  [  ]   Home w/ PT  [  ]  Rehab  [  ]  Discussed with Cardiothoracic Team at AM rounds.

## 2023-04-13 NOTE — DISCHARGE NOTE NURSING/CASE MANAGEMENT/SOCIAL WORK - NSDCPEFALRISK_GEN_ALL_CORE
For information on Fall & Injury Prevention, visit: [email or web address] OR  [email or web address] OR  https://www.cdc.gov/steadi/patient.html

## 2023-04-13 NOTE — DISCHARGE NOTE NURSING/CASE MANAGEMENT/SOCIAL WORK - NSDCPEWEB_GEN_ALL_CORE
[facility] for Tobacco Control website --- [email or web address] website --- [email or web address]

## 2023-04-14 LAB
ALBUMIN SERPL ELPH-MCNC: 3.8 G/DL — SIGNIFICANT CHANGE UP (ref 3.3–5)
ALP SERPL-CCNC: 71 U/L — SIGNIFICANT CHANGE UP (ref 40–120)
ALT FLD-CCNC: 18 U/L — SIGNIFICANT CHANGE UP (ref 10–45)
ANION GAP SERPL CALC-SCNC: 11 MMOL/L — SIGNIFICANT CHANGE UP (ref 5–17)
AST SERPL-CCNC: 18 U/L — SIGNIFICANT CHANGE UP (ref 10–40)
BASOPHILS # BLD AUTO: 0.02 K/UL — SIGNIFICANT CHANGE UP (ref 0–0.2)
BASOPHILS NFR BLD AUTO: 0.2 % — SIGNIFICANT CHANGE UP (ref 0–2)
BILIRUB SERPL-MCNC: 1 MG/DL — SIGNIFICANT CHANGE UP (ref 0.2–1.2)
BUN SERPL-MCNC: 16 MG/DL — SIGNIFICANT CHANGE UP (ref 7–23)
CALCIUM SERPL-MCNC: 9 MG/DL — SIGNIFICANT CHANGE UP (ref 8.4–10.5)
CHLORIDE SERPL-SCNC: 103 MMOL/L — SIGNIFICANT CHANGE UP (ref 96–108)
CO2 SERPL-SCNC: 25 MMOL/L — SIGNIFICANT CHANGE UP (ref 22–31)
CREAT SERPL-MCNC: 1.02 MG/DL — SIGNIFICANT CHANGE UP (ref 0.5–1.3)
EGFR: 81 ML/MIN/1.73M2 — SIGNIFICANT CHANGE UP
EOSINOPHIL # BLD AUTO: 0.22 K/UL — SIGNIFICANT CHANGE UP (ref 0–0.5)
EOSINOPHIL NFR BLD AUTO: 2 % — SIGNIFICANT CHANGE UP (ref 0–6)
GLUCOSE SERPL-MCNC: 112 MG/DL — HIGH (ref 70–99)
HCT VFR BLD CALC: 39.6 % — SIGNIFICANT CHANGE UP (ref 39–50)
HGB BLD-MCNC: 13.4 G/DL — SIGNIFICANT CHANGE UP (ref 13–17)
IMM GRANULOCYTES NFR BLD AUTO: 0.4 % — SIGNIFICANT CHANGE UP (ref 0–0.9)
LYMPHOCYTES # BLD AUTO: 1.3 K/UL — SIGNIFICANT CHANGE UP (ref 1–3.3)
LYMPHOCYTES # BLD AUTO: 11.7 % — LOW (ref 13–44)
MCHC RBC-ENTMCNC: 29.6 PG — SIGNIFICANT CHANGE UP (ref 27–34)
MCHC RBC-ENTMCNC: 33.8 GM/DL — SIGNIFICANT CHANGE UP (ref 32–36)
MCV RBC AUTO: 87.6 FL — SIGNIFICANT CHANGE UP (ref 80–100)
MONOCYTES # BLD AUTO: 1.07 K/UL — HIGH (ref 0–0.9)
MONOCYTES NFR BLD AUTO: 9.6 % — SIGNIFICANT CHANGE UP (ref 2–14)
NEUTROPHILS # BLD AUTO: 8.49 K/UL — HIGH (ref 1.8–7.4)
NEUTROPHILS NFR BLD AUTO: 76.1 % — SIGNIFICANT CHANGE UP (ref 43–77)
NRBC # BLD: 0 /100 WBCS — SIGNIFICANT CHANGE UP (ref 0–0)
PLATELET # BLD AUTO: 140 K/UL — LOW (ref 150–400)
POTASSIUM SERPL-MCNC: 3.9 MMOL/L — SIGNIFICANT CHANGE UP (ref 3.5–5.3)
POTASSIUM SERPL-SCNC: 3.9 MMOL/L — SIGNIFICANT CHANGE UP (ref 3.5–5.3)
PROT SERPL-MCNC: 6.2 G/DL — SIGNIFICANT CHANGE UP (ref 6–8.3)
RBC # BLD: 4.52 M/UL — SIGNIFICANT CHANGE UP (ref 4.2–5.8)
RBC # FLD: 12.7 % — SIGNIFICANT CHANGE UP (ref 10.3–14.5)
SODIUM SERPL-SCNC: 139 MMOL/L — SIGNIFICANT CHANGE UP (ref 135–145)
WBC # BLD: 11.14 K/UL — HIGH (ref 3.8–10.5)
WBC # FLD AUTO: 11.14 K/UL — HIGH (ref 3.8–10.5)

## 2023-04-14 PROCEDURE — 71045 X-RAY EXAM CHEST 1 VIEW: CPT | Mod: 26

## 2023-04-14 RX ORDER — PANTOPRAZOLE SODIUM 20 MG/1
40 TABLET, DELAYED RELEASE ORAL
Refills: 0 | Status: DISCONTINUED | OUTPATIENT
Start: 2023-04-14 | End: 2023-04-16

## 2023-04-14 RX ADMIN — Medication 81 MILLIGRAM(S): at 11:15

## 2023-04-14 RX ADMIN — Medication 50 MILLIGRAM(S): at 20:56

## 2023-04-14 RX ADMIN — Medication 50 MILLIGRAM(S): at 05:47

## 2023-04-14 RX ADMIN — Medication 650 MILLIGRAM(S): at 11:15

## 2023-04-14 RX ADMIN — Medication 650 MILLIGRAM(S): at 11:45

## 2023-04-14 RX ADMIN — AMIODARONE HYDROCHLORIDE 400 MILLIGRAM(S): 400 TABLET ORAL at 05:47

## 2023-04-14 RX ADMIN — ATORVASTATIN CALCIUM 80 MILLIGRAM(S): 80 TABLET, FILM COATED ORAL at 20:56

## 2023-04-14 RX ADMIN — ENOXAPARIN SODIUM 40 MILLIGRAM(S): 100 INJECTION SUBCUTANEOUS at 11:16

## 2023-04-14 RX ADMIN — Medication 50 MILLIGRAM(S): at 11:15

## 2023-04-14 RX ADMIN — GABAPENTIN 100 MILLIGRAM(S): 400 CAPSULE ORAL at 05:47

## 2023-04-14 RX ADMIN — PANTOPRAZOLE SODIUM 40 MILLIGRAM(S): 20 TABLET, DELAYED RELEASE ORAL at 07:44

## 2023-04-14 RX ADMIN — GABAPENTIN 100 MILLIGRAM(S): 400 CAPSULE ORAL at 20:56

## 2023-04-14 RX ADMIN — GABAPENTIN 100 MILLIGRAM(S): 400 CAPSULE ORAL at 11:15

## 2023-04-14 RX ADMIN — CHLORHEXIDINE GLUCONATE 1 APPLICATION(S): 213 SOLUTION TOPICAL at 11:16

## 2023-04-14 RX ADMIN — Medication 500 MILLIGRAM(S): at 18:35

## 2023-04-14 RX ADMIN — Medication 650 MILLIGRAM(S): at 06:02

## 2023-04-14 RX ADMIN — Medication 500 MILLIGRAM(S): at 05:47

## 2023-04-14 RX ADMIN — Medication 650 MILLIGRAM(S): at 05:47

## 2023-04-14 RX ADMIN — SPIRONOLACTONE 25 MILLIGRAM(S): 25 TABLET, FILM COATED ORAL at 05:48

## 2023-04-14 RX ADMIN — Medication 10 MILLIGRAM(S): at 05:48

## 2023-04-14 NOTE — PROGRESS NOTE ADULT - SUBJECTIVE AND OBJECTIVE BOX
Subjective:  "Hello"  OOB chair      Tele:  SR 60-80           V/S:                    T(F): 98.4 (23 @ 06:46), Max: 99.1 (23 @ 23:01)  HR: 70 (23 @ 06:46) (70 - 75)  BP: 121/56 (23 @ 06:46) (121/56 - 140/75)  RR: 18 (23 @ 06:46) (18 - 18)  SpO2: 95% (23 @ 06:46) (94% - 96%)  Wt(kg): --      LV EF:      Labs:      139  |  103  |  16  ----------------------------<  112<H>  3.9   |  25  |  1.02    Ca    9.0      2023 06:24    TPro  6.2  /  Alb  3.8  /  TBili  1.0  /  DBili  x   /  AST  18  /  ALT  18  /  AlkPhos  71                                 13.4   11.14 )-----------( 140      ( 2023 06:24 )             39.6             CAPILLARY BLOOD GLUCOSE      POCT Blood Glucose.: 109 mg/dL (2023 07:03)  POCT Blood Glucose.: 109 mg/dL (2023 20:51)  POCT Blood Glucose.: 112 mg/dL (2023 16:43)  POCT Blood Glucose.: 122 mg/dL (2023 11:32)           CXR:    I&O's Detail    2023 07:01  -  2023 07:00  --------------------------------------------------------  IN:    Oral Fluid: 240 mL  Total IN: 240 mL    OUT:    Bulb (mL): 120 mL    Voided (mL): 2650 mL  Total OUT: 2770 mL    Total NET: -2530 mL      2023 07:01  -  2023 09:44  --------------------------------------------------------  IN:  Total IN: 0 mL    OUT:    Voided (mL): 300 mL  Total OUT: 300 mL    Total NET: -300 mL        RAJANI DRAIN:   [x ] YES [ ] NO >removed    EPICARDIAL WIRES:  [x ] YES [ ] NO  > removed    BOWEL MOVEMENT:  [x ] YES [ ] NO      Daily     Daily Weight in k.9 (2023 05:20)  Medications:  acetaminophen     Tablet .. 650 milliGRAM(s) Oral every 6 hours  acetaminophen     Tablet .. 650 milliGRAM(s) Oral every 6 hours PRN  acetaminophen   IVPB .. 1000 milliGRAM(s) IV Intermittent once  ascorbic acid 500 milliGRAM(s) Oral two times a day  aspirin  chewable 81 milliGRAM(s) Oral daily  atorvastatin 80 milliGRAM(s) Oral at bedtime  bisacodyl Suppository 10 milliGRAM(s) Rectal once  chlorhexidine 2% Cloths 1 Application(s) Topical daily  dextrose 5%. 1000 milliLiter(s) IV Continuous <Continuous>  dextrose 5%. 1000 milliLiter(s) IV Continuous <Continuous>  dextrose 50% Injectable 50 milliLiter(s) IV Push every 15 minutes  dextrose 50% Injectable 25 milliLiter(s) IV Push every 15 minutes  dextrose Oral Gel 15 Gram(s) Oral once PRN  enoxaparin Injectable 40 milliGRAM(s) SubCutaneous every 24 hours  gabapentin 100 milliGRAM(s) Oral every 8 hours  glucagon  Injectable 1 milliGRAM(s) IntraMuscular once  HYDROmorphone   Tablet 2 milliGRAM(s) Oral every 6 hours PRN  HYDROmorphone   Tablet 4 milliGRAM(s) Oral every 6 hours PRN  insulin lispro (ADMELOG) corrective regimen sliding scale   SubCutaneous three times a day before meals  insulin lispro (ADMELOG) corrective regimen sliding scale   SubCutaneous at bedtime  insulin regular Infusion 3 Unit(s)/Hr IV Continuous <Continuous>  metoprolol tartrate 50 milliGRAM(s) Oral every 8 hours  pantoprazole    Tablet 40 milliGRAM(s) Oral before breakfast  polyethylene glycol 3350 17 Gram(s) Oral daily  senna 2 Tablet(s) Oral at bedtime  sodium chloride 0.9%. 1000 milliLiter(s) IV Continuous <Continuous>  spironolactone 25 milliGRAM(s) Oral daily  torsemide 10 milliGRAM(s) Oral daily        Physical Exam:    Neurology: alert and oriented x 3    CV : s1 s2 RRR    Sternal Wound :  CDI , Stable> mediastinal bulb removed    Lungs: Clear bilaterally    Abdomen: soft, nontender, nondistended, positive bowel sounds, last bowel movement this am             :    voiding           Extremities:     trace  edema                    PAST MEDICAL & SURGICAL HISTORY:  HTN (hypertension)      CAD (coronary artery disease)  coronary artery stenting 2017 (LAD x1 stent)      COPD without exacerbation  uses symbicort PRN      Thoracic aortic aneurysm  diagnosed 4 years ago (4.2 no change per patient, last CT - Dr. Cope)  states was evaluated by Dr. Lancaster and advised no need for surgery, just monitoring.      H/O unstable angina  prompted cardiac cath in 2017  stent x 1 LAD      Current smoker  1 cigar twice a week   smoking started in 's      Lung nodule      Liver nodule      History of cholecystectomy      GERD (gastroesophageal reflux disease)      Aspiration pneumonia  2021      Fatty liver      Thyroid nodule      Sepsis with acute renal failure      HLD (hyperlipidemia)      Arthritis      Kidney stone      Thoracic aortic aneurysm      S/P hernia repair  bilateral      H/O umbilical hernia repair      History of bowel resection      History of cholecystectomy      H/O inguinal hernia repair      Implantable loop recorder present      S/P colonoscopy      History of tonsillectomy      Stented coronary artery

## 2023-04-15 LAB
ALBUMIN SERPL ELPH-MCNC: 3.7 G/DL — SIGNIFICANT CHANGE UP (ref 3.3–5)
ALP SERPL-CCNC: 77 U/L — SIGNIFICANT CHANGE UP (ref 40–120)
ALT FLD-CCNC: 21 U/L — SIGNIFICANT CHANGE UP (ref 10–45)
ANION GAP SERPL CALC-SCNC: 12 MMOL/L — SIGNIFICANT CHANGE UP (ref 5–17)
AST SERPL-CCNC: 21 U/L — SIGNIFICANT CHANGE UP (ref 10–40)
BILIRUB SERPL-MCNC: 0.8 MG/DL — SIGNIFICANT CHANGE UP (ref 0.2–1.2)
BUN SERPL-MCNC: 16 MG/DL — SIGNIFICANT CHANGE UP (ref 7–23)
CALCIUM SERPL-MCNC: 9.1 MG/DL — SIGNIFICANT CHANGE UP (ref 8.4–10.5)
CHLORIDE SERPL-SCNC: 100 MMOL/L — SIGNIFICANT CHANGE UP (ref 96–108)
CO2 SERPL-SCNC: 26 MMOL/L — SIGNIFICANT CHANGE UP (ref 22–31)
CREAT SERPL-MCNC: 1.1 MG/DL — SIGNIFICANT CHANGE UP (ref 0.5–1.3)
EGFR: 74 ML/MIN/1.73M2 — SIGNIFICANT CHANGE UP
GLUCOSE SERPL-MCNC: 153 MG/DL — HIGH (ref 70–99)
HCT VFR BLD CALC: 39.4 % — SIGNIFICANT CHANGE UP (ref 39–50)
HGB BLD-MCNC: 13.1 G/DL — SIGNIFICANT CHANGE UP (ref 13–17)
MAGNESIUM SERPL-MCNC: 2.1 MG/DL — SIGNIFICANT CHANGE UP (ref 1.6–2.6)
MCHC RBC-ENTMCNC: 29.2 PG — SIGNIFICANT CHANGE UP (ref 27–34)
MCHC RBC-ENTMCNC: 33.2 GM/DL — SIGNIFICANT CHANGE UP (ref 32–36)
MCV RBC AUTO: 87.9 FL — SIGNIFICANT CHANGE UP (ref 80–100)
NRBC # BLD: 0 /100 WBCS — SIGNIFICANT CHANGE UP (ref 0–0)
PHOSPHATE SERPL-MCNC: 3.4 MG/DL — SIGNIFICANT CHANGE UP (ref 2.5–4.5)
PLATELET # BLD AUTO: 170 K/UL — SIGNIFICANT CHANGE UP (ref 150–400)
POTASSIUM SERPL-MCNC: 3.8 MMOL/L — SIGNIFICANT CHANGE UP (ref 3.5–5.3)
POTASSIUM SERPL-SCNC: 3.8 MMOL/L — SIGNIFICANT CHANGE UP (ref 3.5–5.3)
PROT SERPL-MCNC: 6.2 G/DL — SIGNIFICANT CHANGE UP (ref 6–8.3)
RBC # BLD: 4.48 M/UL — SIGNIFICANT CHANGE UP (ref 4.2–5.8)
RBC # FLD: 12.8 % — SIGNIFICANT CHANGE UP (ref 10.3–14.5)
SODIUM SERPL-SCNC: 138 MMOL/L — SIGNIFICANT CHANGE UP (ref 135–145)
WBC # BLD: 11.96 K/UL — HIGH (ref 3.8–10.5)
WBC # FLD AUTO: 11.96 K/UL — HIGH (ref 3.8–10.5)

## 2023-04-15 RX ORDER — METOPROLOL TARTRATE 50 MG
100 TABLET ORAL DAILY
Refills: 0 | Status: DISCONTINUED | OUTPATIENT
Start: 2023-04-15 | End: 2023-04-16

## 2023-04-15 RX ORDER — POTASSIUM CHLORIDE 20 MEQ
40 PACKET (EA) ORAL ONCE
Refills: 0 | Status: COMPLETED | OUTPATIENT
Start: 2023-04-15 | End: 2023-04-15

## 2023-04-15 RX ADMIN — ATORVASTATIN CALCIUM 80 MILLIGRAM(S): 80 TABLET, FILM COATED ORAL at 21:07

## 2023-04-15 RX ADMIN — CHLORHEXIDINE GLUCONATE 1 APPLICATION(S): 213 SOLUTION TOPICAL at 12:06

## 2023-04-15 RX ADMIN — Medication 10 MILLIGRAM(S): at 05:36

## 2023-04-15 RX ADMIN — GABAPENTIN 100 MILLIGRAM(S): 400 CAPSULE ORAL at 05:36

## 2023-04-15 RX ADMIN — SPIRONOLACTONE 25 MILLIGRAM(S): 25 TABLET, FILM COATED ORAL at 05:36

## 2023-04-15 RX ADMIN — Medication 40 MILLIEQUIVALENT(S): at 08:31

## 2023-04-15 RX ADMIN — Medication 81 MILLIGRAM(S): at 12:06

## 2023-04-15 RX ADMIN — Medication 500 MILLIGRAM(S): at 05:35

## 2023-04-15 RX ADMIN — PANTOPRAZOLE SODIUM 40 MILLIGRAM(S): 20 TABLET, DELAYED RELEASE ORAL at 05:36

## 2023-04-15 RX ADMIN — GABAPENTIN 100 MILLIGRAM(S): 400 CAPSULE ORAL at 12:06

## 2023-04-15 RX ADMIN — Medication 500 MILLIGRAM(S): at 17:03

## 2023-04-15 RX ADMIN — GABAPENTIN 100 MILLIGRAM(S): 400 CAPSULE ORAL at 21:07

## 2023-04-15 RX ADMIN — Medication 50 MILLIGRAM(S): at 05:36

## 2023-04-15 RX ADMIN — ENOXAPARIN SODIUM 40 MILLIGRAM(S): 100 INJECTION SUBCUTANEOUS at 12:05

## 2023-04-15 NOTE — PROGRESS NOTE ADULT - PROBLEM SELECTOR PLAN 1
BP CONTROL  Asa, Statin, B-blocker, Chest PT,  Incentive spirometry, wound care and assessment.  Ambulate   D/c mediastinal bulb  DC PW
BP CONTROL  Asa, Statin, B-blocker, Chest PT,  Incentive spirometry, wound care and assessment.  Ambulate   D/c ct's as drainage decreases
BP CONTROL  Asa, Statin, B-blocker, Chest PT,  Incentive spirometry, wound care and assessment.  Ambulate   D/c ct's as drainage decreases
BP CONTROL  Asa, Statin, B-blocker, Chest PT,  Incentive spirometry, wound care and assessment.  Ambulate   Home Sunday

## 2023-04-15 NOTE — PROGRESS NOTE ADULT - SUBJECTIVE AND OBJECTIVE BOX
Subjective:  "Hello"  OOB chair      Tele:  SR 70-80           V/S:                    T(F): 99 (04-15-23 @ 07:31), Max: 99.2 (04-15-23 @ 03:22)  HR: 73 (04-15-23 @ 07:31) (71 - 80)  BP: 114/67 (04-15-23 @ 07:31) (114/67 - 133/65)  RR: 18 (04-15-23 @ 07:31) (18 - 18)  SpO2: 95% (04-15-23 @ 07:31) (94% - 96%)  Wt(kg): --      LV EF:      Labs:  04-15    138  |  100  |  16  ----------------------------<  153<H>  3.8   |  26  |  1.10    Ca    9.1      15 Apr 2023 06:09  Phos  3.4     04-15  Mg     2.1     04-15    TPro  6.2  /  Alb  3.7  /  TBili  0.8  /  DBili  x   /  AST  21  /  ALT  21  /  AlkPhos  77  04-15                               13.1   11.96 )-----------( 170      ( 15 Apr 2023 06:09 )             39.4             CAPILLARY BLOOD GLUCOSE      POCT Blood Glucose.: 110 mg/dL (15 Apr 2023 07:35)  POCT Blood Glucose.: 117 mg/dL (2023 21:22)  POCT Blood Glucose.: 100 mg/dL (2023 16:47)  POCT Blood Glucose.: 145 mg/dL (2023 11:21)           CXR:    I&O's Detail    2023 07:01  -  15 Apr 2023 07:00  --------------------------------------------------------  IN:    Oral Fluid: 817 mL  Total IN: 817 mL    OUT:    Bulb (mL): 15 mL    Voided (mL): 1600 mL  Total OUT: 1615 mL    Total NET: -798 mL      15 Apr 2023 07:01  -  15 Apr 2023 10:10  --------------------------------------------------------  IN:    Oral Fluid: 240 mL  Total IN: 240 mL    OUT:    Voided (mL): 650 mL  Total OUT: 650 mL    Total NET: -410 mL      BOWEL MOVEMENT:  [x ] YES [ ] NO      Daily     Daily Weight in k.4 (15 Apr 2023 07:31)  Medications:  acetaminophen     Tablet .. 650 milliGRAM(s) Oral every 6 hours PRN  ascorbic acid 500 milliGRAM(s) Oral two times a day  aspirin  chewable 81 milliGRAM(s) Oral daily  atorvastatin 80 milliGRAM(s) Oral at bedtime  bisacodyl Suppository 10 milliGRAM(s) Rectal once  chlorhexidine 2% Cloths 1 Application(s) Topical daily  dextrose 5%. 1000 milliLiter(s) IV Continuous <Continuous>  dextrose 5%. 1000 milliLiter(s) IV Continuous <Continuous>  dextrose 50% Injectable 50 milliLiter(s) IV Push every 15 minutes  dextrose 50% Injectable 25 milliLiter(s) IV Push every 15 minutes  dextrose Oral Gel 15 Gram(s) Oral once PRN  enoxaparin Injectable 40 milliGRAM(s) SubCutaneous every 24 hours  gabapentin 100 milliGRAM(s) Oral every 8 hours  glucagon  Injectable 1 milliGRAM(s) IntraMuscular once  HYDROmorphone   Tablet 2 milliGRAM(s) Oral every 6 hours PRN  insulin lispro (ADMELOG) corrective regimen sliding scale   SubCutaneous three times a day before meals  insulin lispro (ADMELOG) corrective regimen sliding scale   SubCutaneous at bedtime  insulin regular Infusion 3 Unit(s)/Hr IV Continuous <Continuous>  metoprolol succinate  milliGRAM(s) Oral daily  pantoprazole    Tablet 40 milliGRAM(s) Oral before breakfast  polyethylene glycol 3350 17 Gram(s) Oral daily  senna 2 Tablet(s) Oral at bedtime  sodium chloride 0.9%. 1000 milliLiter(s) IV Continuous <Continuous>  spironolactone 25 milliGRAM(s) Oral daily  torsemide 10 milliGRAM(s) Oral daily        Physical Exam:      Neurology: alert and oriented x 3    CV : s1 s2 RRR    Sternal Wound :  CDI , Stable> mediastinal bulb removed    Lungs: Clear bilaterally    Abdomen: soft, nontender, nondistended, positive bowel sounds, last bowel movement this am             :    voiding           Extremities:     trace  edema                    PAST MEDICAL & SURGICAL HISTORY:  HTN (hypertension)      CAD (coronary artery disease)  coronary artery stenting  (LAD x1 stent)      COPD without exacerbation  uses symbicort PRN      Thoracic aortic aneurysm  diagnosed 4 years ago (4.2 no change per patient, last CT - Dr. Cope)  states was evaluated by Dr. Lancaster and advised no need for surgery, just monitoring.      H/O unstable angina  prompted cardiac cath in   stent x 1 LAD      Current smoker  1 cigar twice a week   smoking started in       Lung nodule      Liver nodule      History of cholecystectomy      GERD (gastroesophageal reflux disease)      Aspiration pneumonia  2021      Fatty liver      Thyroid nodule      Sepsis with acute renal failure      HLD (hyperlipidemia)      Arthritis      Kidney stone      Thoracic aortic aneurysm      S/P hernia repair  bilateral      H/O umbilical hernia repair      History of bowel resection      History of cholecystectomy      H/O inguinal hernia repair      Implantable loop recorder present      S/P colonoscopy      History of tonsillectomy      Stented coronary artery

## 2023-04-15 NOTE — PROGRESS NOTE ADULT - ASSESSMENT
67 year old male presents to PST prior to scheduled Ascending Aortic Replacement with Dr. Parra on 4/11/2023. PMhx Current smoker (20 pack years), COPD, CAD, Dilated aortic root, HTN, HLD, GERD, Thyroid nodule, Lung Nodule, Liver Nodule, Fatty Liver, kidney stones, colon polyps s/p colon resection. PShx Colon resection (Nov. 2021), Appendectomy, umbilical hernia repair, b/l inguinal hernia repair, tonsillectomy, Loop recorder, Coronary stents. C/o ongoing epigastric intermittent pain unrelated to activity. Current smoker but has been weaning his cigarette use significantly. At present denies any chest pain, palpitations, SOB, N/V, fever or chills.   4/11/23   Replacement of ascending aneurysm with 32mm interposition graft  1000 FEIBA, 2 platelet, 1 PRBC  EF normal  Fluid resuscitation  Extubated d 0  Started on beta blocker  4/12 Transferred to sdu
 67 year old male presents to PST prior to scheduled Ascending Aortic Replacement with Dr. Parra on 4/11/2023. PMhx Current smoker (20 pack years), COPD, CAD, Dilated aortic root, HTN, HLD, GERD, Thyroid nodule, Lung Nodule, Liver Nodule, Fatty Liver, kidney stones, colon polyps s/p colon resection. PShx Colon resection (Nov. 2021), Appendectomy, umbilical hernia repair, b/l inguinal hernia repair, tonsillectomy, Loop recorder, Coronary stents. C/o ongoing epigastric intermittent pain unrelated to activity. Current smoker but has been weaning his cigarette use significantly. At present denies any chest pain, palpitations, SOB, N/V, fever or chills.   4/11/23   Replacement of ascending aneurysm with 32mm interposition graft  1000 FEIBA, 2 platelet, 1 PRBC  EF normal  Fluid resuscitation  Extubated d 0  Started on beta blocker  4/12 Transferred to sdu  Increase beta blocker to 50 tid  Ambulate  D/c bulb when drainage decreases.  4/13 DC mediastinal bulb  PW removed Ambulating independently
 67 year old male presents to PST prior to scheduled Ascending Aortic Replacement with Dr. Parra on 4/11/2023. PMhx Current smoker (20 pack years), COPD, CAD, Dilated aortic root, HTN, HLD, GERD, Thyroid nodule, Lung Nodule, Liver Nodule, Fatty Liver, kidney stones, colon polyps s/p colon resection. PShx Colon resection (Nov. 2021), Appendectomy, umbilical hernia repair, b/l inguinal hernia repair, tonsillectomy, Loop recorder, Coronary stents. C/o ongoing epigastric intermittent pain unrelated to activity. Current smoker but has been weaning his cigarette use significantly. At present denies any chest pain, palpitations, SOB, N/V, fever or chills.   4/11/23   Replacement of ascending aneurysm with 32mm interposition graft  1000 FEIBA, 2 platelet, 1 PRBC  EF normal  Fluid resuscitation  Extubated d 0  Started on beta blocker  4/12 Transferred to sdu  Increase beta blocker to 50 tid  Ambulate  D/c bulb when drainage decreases.
 67 year old male presents to PST prior to scheduled Ascending Aortic Replacement with Dr. Parra on 4/11/2023. PMhx Current smoker (20 pack years), COPD, CAD, Dilated aortic root, HTN, HLD, GERD, Thyroid nodule, Lung Nodule, Liver Nodule, Fatty Liver, kidney stones, colon polyps s/p colon resection. PShx Colon resection (Nov. 2021), Appendectomy, umbilical hernia repair, b/l inguinal hernia repair, tonsillectomy, Loop recorder, Coronary stents. C/o ongoing epigastric intermittent pain unrelated to activity. Current smoker but has been weaning his cigarette use significantly. At present denies any chest pain, palpitations, SOB, N/V, fever or chills.   4/11/23   Replacement of ascending aneurysm with 32mm interposition graft  1000 FEIBA, 2 platelet, 1 PRBC  EF normal  Fluid resuscitation  Extubated d 0  Started on beta blocker  4/12 Transferred to sdu  Increase beta blocker to 50 tid  Ambulate  D/c bulb when drainage decreases.  4/14 DC mediastinal bulb  PW removed Ambulating independently  4/15 VSS Ambulating Shower

## 2023-04-16 ENCOUNTER — TRANSCRIPTION ENCOUNTER (OUTPATIENT)
Age: 68
End: 2023-04-16

## 2023-04-16 VITALS
RESPIRATION RATE: 18 BRPM | HEART RATE: 86 BPM | OXYGEN SATURATION: 95 % | TEMPERATURE: 99 F | WEIGHT: 178.79 LBS | SYSTOLIC BLOOD PRESSURE: 119 MMHG | DIASTOLIC BLOOD PRESSURE: 71 MMHG

## 2023-04-16 LAB
ALBUMIN SERPL ELPH-MCNC: 3.5 G/DL — SIGNIFICANT CHANGE UP (ref 3.3–5)
ALP SERPL-CCNC: 88 U/L — SIGNIFICANT CHANGE UP (ref 40–120)
ALT FLD-CCNC: 24 U/L — SIGNIFICANT CHANGE UP (ref 10–45)
ANION GAP SERPL CALC-SCNC: 14 MMOL/L — SIGNIFICANT CHANGE UP (ref 5–17)
AST SERPL-CCNC: 23 U/L — SIGNIFICANT CHANGE UP (ref 10–40)
BASOPHILS # BLD AUTO: 0.04 K/UL — SIGNIFICANT CHANGE UP (ref 0–0.2)
BASOPHILS NFR BLD AUTO: 0.3 % — SIGNIFICANT CHANGE UP (ref 0–2)
BILIRUB SERPL-MCNC: 1.2 MG/DL — SIGNIFICANT CHANGE UP (ref 0.2–1.2)
BUN SERPL-MCNC: 21 MG/DL — SIGNIFICANT CHANGE UP (ref 7–23)
CALCIUM SERPL-MCNC: 9 MG/DL — SIGNIFICANT CHANGE UP (ref 8.4–10.5)
CHLORIDE SERPL-SCNC: 102 MMOL/L — SIGNIFICANT CHANGE UP (ref 96–108)
CO2 SERPL-SCNC: 22 MMOL/L — SIGNIFICANT CHANGE UP (ref 22–31)
CREAT SERPL-MCNC: 1.17 MG/DL — SIGNIFICANT CHANGE UP (ref 0.5–1.3)
EGFR: 68 ML/MIN/1.73M2 — SIGNIFICANT CHANGE UP
EOSINOPHIL # BLD AUTO: 0.25 K/UL — SIGNIFICANT CHANGE UP (ref 0–0.5)
EOSINOPHIL NFR BLD AUTO: 2.1 % — SIGNIFICANT CHANGE UP (ref 0–6)
GLUCOSE SERPL-MCNC: 108 MG/DL — HIGH (ref 70–99)
HCT VFR BLD CALC: 41.4 % — SIGNIFICANT CHANGE UP (ref 39–50)
HGB BLD-MCNC: 13.6 G/DL — SIGNIFICANT CHANGE UP (ref 13–17)
IMM GRANULOCYTES NFR BLD AUTO: 0.6 % — SIGNIFICANT CHANGE UP (ref 0–0.9)
LYMPHOCYTES # BLD AUTO: 1.38 K/UL — SIGNIFICANT CHANGE UP (ref 1–3.3)
LYMPHOCYTES # BLD AUTO: 11.6 % — LOW (ref 13–44)
MCHC RBC-ENTMCNC: 29.6 PG — SIGNIFICANT CHANGE UP (ref 27–34)
MCHC RBC-ENTMCNC: 32.9 GM/DL — SIGNIFICANT CHANGE UP (ref 32–36)
MCV RBC AUTO: 90 FL — SIGNIFICANT CHANGE UP (ref 80–100)
MONOCYTES # BLD AUTO: 1.2 K/UL — HIGH (ref 0–0.9)
MONOCYTES NFR BLD AUTO: 10.1 % — SIGNIFICANT CHANGE UP (ref 2–14)
NEUTROPHILS # BLD AUTO: 8.97 K/UL — HIGH (ref 1.8–7.4)
NEUTROPHILS NFR BLD AUTO: 75.3 % — SIGNIFICANT CHANGE UP (ref 43–77)
NRBC # BLD: 0 /100 WBCS — SIGNIFICANT CHANGE UP (ref 0–0)
PLATELET # BLD AUTO: 173 K/UL — SIGNIFICANT CHANGE UP (ref 150–400)
POTASSIUM SERPL-MCNC: 3.8 MMOL/L — SIGNIFICANT CHANGE UP (ref 3.5–5.3)
POTASSIUM SERPL-SCNC: 3.8 MMOL/L — SIGNIFICANT CHANGE UP (ref 3.5–5.3)
PROT SERPL-MCNC: 6.3 G/DL — SIGNIFICANT CHANGE UP (ref 6–8.3)
RBC # BLD: 4.6 M/UL — SIGNIFICANT CHANGE UP (ref 4.2–5.8)
RBC # FLD: 12.8 % — SIGNIFICANT CHANGE UP (ref 10.3–14.5)
SODIUM SERPL-SCNC: 138 MMOL/L — SIGNIFICANT CHANGE UP (ref 135–145)
WBC # BLD: 11.91 K/UL — HIGH (ref 3.8–10.5)
WBC # FLD AUTO: 11.91 K/UL — HIGH (ref 3.8–10.5)

## 2023-04-16 RX ORDER — AMLODIPINE BESYLATE 2.5 MG/1
1 TABLET ORAL
Qty: 0 | Refills: 0 | DISCHARGE

## 2023-04-16 RX ORDER — ALPRAZOLAM 0.25 MG
1 TABLET ORAL
Qty: 0 | Refills: 0 | DISCHARGE

## 2023-04-16 RX ORDER — NEBIVOLOL HYDROCHLORIDE 5 MG/1
1 TABLET ORAL
Qty: 0 | Refills: 0 | DISCHARGE

## 2023-04-16 RX ORDER — AZILSARTAN KAMEDOXOMIL 40 MG/1
1 TABLET ORAL
Qty: 0 | Refills: 0 | DISCHARGE

## 2023-04-16 RX ORDER — SIMETHICONE 80 MG/1
80 TABLET, CHEWABLE ORAL ONCE
Refills: 0 | Status: COMPLETED | OUTPATIENT
Start: 2023-04-16 | End: 2023-04-16

## 2023-04-16 RX ORDER — ACETAMINOPHEN 500 MG
325 TABLET ORAL
Qty: 0 | Refills: 0 | DISCHARGE
Start: 2023-04-16

## 2023-04-16 RX ORDER — METOPROLOL TARTRATE 50 MG
1 TABLET ORAL
Qty: 30 | Refills: 1
Start: 2023-04-16

## 2023-04-16 RX ORDER — HYDROMORPHONE HYDROCHLORIDE 2 MG/ML
1 INJECTION INTRAMUSCULAR; INTRAVENOUS; SUBCUTANEOUS
Qty: 20 | Refills: 0
Start: 2023-04-16

## 2023-04-16 RX ORDER — SENNA PLUS 8.6 MG/1
2 TABLET ORAL
Qty: 0 | Refills: 0 | DISCHARGE
Start: 2023-04-16

## 2023-04-16 RX ORDER — SPIRONOLACTONE 25 MG/1
1 TABLET, FILM COATED ORAL
Qty: 7 | Refills: 0
Start: 2023-04-16 | End: 2023-04-22

## 2023-04-16 RX ADMIN — SIMETHICONE 80 MILLIGRAM(S): 80 TABLET, CHEWABLE ORAL at 01:47

## 2023-04-16 RX ADMIN — GABAPENTIN 100 MILLIGRAM(S): 400 CAPSULE ORAL at 05:36

## 2023-04-16 RX ADMIN — SPIRONOLACTONE 25 MILLIGRAM(S): 25 TABLET, FILM COATED ORAL at 05:35

## 2023-04-16 RX ADMIN — GABAPENTIN 100 MILLIGRAM(S): 400 CAPSULE ORAL at 11:55

## 2023-04-16 RX ADMIN — Medication 100 MILLIGRAM(S): at 05:35

## 2023-04-16 RX ADMIN — Medication 81 MILLIGRAM(S): at 11:55

## 2023-04-16 RX ADMIN — Medication 500 MILLIGRAM(S): at 05:35

## 2023-04-16 RX ADMIN — Medication 10 MILLIGRAM(S): at 05:35

## 2023-04-16 RX ADMIN — PANTOPRAZOLE SODIUM 40 MILLIGRAM(S): 20 TABLET, DELAYED RELEASE ORAL at 05:35

## 2023-04-16 NOTE — DISCHARGE NOTE PROVIDER - CARE PROVIDERS DIRECT ADDRESSES
[email or web address]

## 2023-04-16 NOTE — DISCHARGE NOTE PROVIDER - NSDCFUADDINST_GEN_ALL_CORE_FT
Wash incisions with soap and water using washcloth in shower daily,do not apply any lotion,powder,oil,sunscreen to any surgical incision  Please come to ED or Call Cardio thoracic office at [phone] if Chest pain, Shortness of Breath, persistant Nausea & vomiting, oozing from wounds,palpitations or pain not relieved with medication  Weigh yourself daily>notify surgeons office if  2 lb increase in weight in 24 hours.  1. Take ALL of your medications as ordered. Fill your prescriptions the day you are discharged and take according to the schedule you were given. Continue to take a stool softener if you are taking narcotic pain medications. AVOID medications such as ibuprofen or naproxen if you have had bypass surgery. If you have any questions or are unable to fill the prescriptions, please call the office right away at [phone].  2. Shower daily. Clean all incisions daily while showering with warm water and mild soap, pat dry with a clean towel and do not cover with any dressings unless instructed to. No bathing, swimming in a pool or the ocean until instructed by MD.  DO NOT use creams or lotions on the wound.  3. We advise that you do not drive until instructed by MD. Use your red pillow between chest and seatbelt to avoid injury in the event of a motor vehicle accident until you see the surgeon again in the office.  4. You may not return to work until instructed by MD.   5. Please eat a low fat, low cholesterol, low salt diet. (No added/extra salt). A nutritional supplement like Ensure or Glucerna (for diabetics) may help you reach your nutritional goals after surgery and aid in your recovery.  6. Weigh yourself every day in the morning and record it in the weight log in your red folder. Notify the office of any weight gain more than 2-3 pounds in 24 hours.  **Please LIMIT YOUR FLUID INTAKE TO ABOUT 4 8 OUNCE GLASSES OF BEVERAGE DAILY.**  7. Continue breathing exercises several times a day. Continue to use your heart pillow when coughing.  8. No heavy lifting nothing greater than 5 pounds until cleared by MD. We recommend that you sleep on your back and not your side or stomach for the next 4-6 weeks.  9. Call / Notify MD any fever greater than 101.0, any drainage from incisions or if they become red, hot or very tender to the touch.  10. Increase activity as tolerated. Walk indoors and/or outdoors at least 3 times a day.

## 2023-04-16 NOTE — DISCHARGE NOTE PROVIDER - NSDCMRMEDTOKEN_GEN_ALL_CORE_FT
acetaminophen: 325 milligram(s) orally every 8 hours as needed for  mild pain  aspirin 81 mg oral tablet, chewable: 1 tab(s) orally once a day  Lipitor 40 mg oral tablet: 1 tab(s) orally once a day  metoprolol succinate 100 mg oral tablet, extended release: 1 tab(s) orally once a day  omeprazole 40 mg oral delayed release capsule: 1 orally  senna leaf extract oral tablet: 2 tab(s) orally once a day (at bedtime)  spironolactone 25 mg oral tablet: 1 tab(s) orally once a day  sucralfate 1 g oral tablet: 1 tab(s) orally 4 times a day (before meals and at bedtime)  torsemide 10 mg oral tablet: 1 tab(s) orally once a day

## 2023-04-16 NOTE — DISCHARGE NOTE PROVIDER - HOSPITAL COURSE
67 year old male presents to PST prior to scheduled Ascending Aortic Replacement with Dr. Parra on 4/11/2023. PMhx Current smoker (20 pack years), COPD, CAD, Dilated aortic root, HTN, HLD, GERD, Thyroid nodule, Lung Nodule, Liver Nodule, Fatty Liver, kidney stones, colon polyps s/p colon resection. PShx Colon resection (Nov. 2021), Appendectomy, umbilical hernia repair, b/l inguinal hernia repair, tonsillectomy, Loop recorder, Coronary stents. C/o ongoing epigastric intermittent pain unrelated to activity. Current smoker but has been weaning his cigarette use significantly. At present denies any chest pain, palpitations, SOB, N/V, fever or chills.   4/11/23   Replacement of ascending aneurysm with 32mm interposition graft  1000 FEIBA, 2 platelet, 1 PRBC  EF normal  Fluid resuscitation  Extubated d 0  Started on beta blocker  4/12 Transferred to sdu  Increase beta blocker to 50 tid  Ambulate  D/c bulb when drainage decreases.  4/14 DC mediastinal bulb  PW removed Ambulating independently  4/15 VSS Ambulating Shower  4/16 DC home

## 2023-04-16 NOTE — DISCHARGE NOTE PROVIDER - NSDCFUADDAPPT_GEN_ALL_CORE_FT
Please follow up with Dr. Parra in 1 week> Call Leigh Mcintyre    to arrange> [phone]  Cardiac surgery office at [facility] entrance 3  first floor on left after entering [facility]  Office telephone     Your Care Navigator Nurse Practitioner will be in touch to see you in your home within a few days from discharge. The Follow Your Heart program can help ensure you understand your medications, discharge instructions and answer any questions you may have at that time. They are also a great source to address concerns during the day and may be reached at [phone].

## 2023-04-16 NOTE — DISCHARGE NOTE PROVIDER - NSDCCPCAREPLAN_GEN_ALL_CORE_FT
PRINCIPAL DISCHARGE DIAGNOSIS  Diagnosis: Thoracic aortic aneurysm  Assessment and Plan of Treatment:

## 2023-04-16 NOTE — DISCHARGE NOTE PROVIDER - CARE PROVIDER_API CALL
Kevin Parra (MD)  Surgery; Surgical Critical Care; Thoracic and Cardiac Surgery  [address] [place] [address]  Phone: [phone]  Fax: [phone]  Follow Up Time: 1 week

## 2023-04-16 NOTE — DISCHARGE NOTE PROVIDER - NSDCPNSUBOBJ_GEN_ALL_CORE
Subjective:  "Hello"  Ambulating independently      Tele:  SR 80s           V/S:                    T(F): 98.9 (23 @ 07:40), Max: 98.9 (04-15-23 @ 23:24)  HR: 86 (23 @ 07:40) (86 - 99)  BP: 119/71 (23 @ 07:40) (119/71 - 138/71)  RR: 18 (23 @ 07:40) (18 - 18)  SpO2: 95% (23 @ 07:40) (95% - 96%)  Wt(kg): --      LV EF:      Labs:      138  |  102  |  21  ----------------------------<  108<H>  3.8   |  22  |  1.17    Ca    9.0      2023 06:02  Phos  3.4     04-15  Mg     2.1     04-15    TPro  6.3  /  Alb  3.5  /  TBili  1.2  /  DBili  x   /  AST  23  /  ALT  24  /  AlkPhos  88                                 13.6   11.91 )-----------( 173      ( 2023 05:59 )             41.4             CAPILLARY BLOOD GLUCOSE      POCT Blood Glucose.: 119 mg/dL (2023 07:44)  POCT Blood Glucose.: 122 mg/dL (15 Apr 2023 20:50)  POCT Blood Glucose.: 106 mg/dL (15 Apr 2023 16:42)  POCT Blood Glucose.: 129 mg/dL (15 Apr 2023 11:43)           CXR:    I&O's Detail    15 Apr 2023 07:01  -  2023 07:00  --------------------------------------------------------  IN:    Oral Fluid: 480 mL  Total IN: 480 mL    OUT:    Voided (mL): 1700 mL  Total OUT: 1700 mL    Total NET: -1220 mL      2023 07:01  -  2023 10:47  --------------------------------------------------------  IN:    Oral Fluid: 240 mL  Total IN: 240 mL    OUT:    Voided (mL): 500 mL  Total OUT: 500 mL    Total NET: -260 mL        BOWEL MOVEMENT:  [x ] YES [ ] NO      Daily     Daily Weight in k.1 (2023 07:40)  Medications:  acetaminophen     Tablet .. 650 milliGRAM(s) Oral every 6 hours PRN  aspirin  chewable 81 milliGRAM(s) Oral daily  atorvastatin 80 milliGRAM(s) Oral at bedtime  bisacodyl Suppository 10 milliGRAM(s) Rectal once  chlorhexidine 2% Cloths 1 Application(s) Topical daily  dextrose 5%. 1000 milliLiter(s) IV Continuous <Continuous>  dextrose 5%. 1000 milliLiter(s) IV Continuous <Continuous>  gabapentin 100 milliGRAM(s) Oral every 8 hours  glucagon  Injectable 1 milliGRAM(s) IntraMuscular once  HYDROmorphone   Tablet 2 milliGRAM(s) Oral every 6 hours PRN  metoprolol succinate  milliGRAM(s) Oral daily  pantoprazole    Tablet 40 milliGRAM(s) Oral before breakfast  polyethylene glycol 3350 17 Gram(s) Oral daily  senna 2 Tablet(s) Oral at bedtime  sodium chloride 0.9%. 1000 milliLiter(s) IV Continuous <Continuous>  spironolactone 25 milliGRAM(s) Oral daily  torsemide 10 milliGRAM(s) Oral daily        Physical Exam:    Neurology: alert and oriented x 3    CV : s1 s2 RRR    Sternal Wound :  CDI , Stable    Lungs: Clear bilaterally    Abdomen: soft, nontender, nondistended, positive bowel sounds, last bowel movement this am             :    voiding           Extremities:     trace  edema                      PAST MEDICAL & SURGICAL HISTORY:  HTN (hypertension)      CAD (coronary artery disease)  coronary artery stenting 2017 (LAD x1 stent)      COPD without exacerbation  uses symbicort PRN      Thoracic aortic aneurysm  diagnosed 4 years ago (4.2 no change per patient, last CT - Dr. Cope)  states was evaluated by Dr. Lancaster and advised no need for surgery, just monitoring.      H/O unstable angina  prompted cardiac cath in 2017  stent x 1 LAD      Current smoker  1 cigar twice a week   smoking started in       Lung nodule      Liver nodule      History of cholecystectomy      GERD (gastroesophageal reflux disease)      Aspiration pneumonia  2021      Fatty liver      Thyroid nodule      Sepsis with acute renal failure      HLD (hyperlipidemia)      Arthritis      Kidney stone      Thoracic aortic aneurysm      S/P hernia repair  bilateral      H/O umbilical hernia repair      History of bowel resection      History of cholecystectomy      H/O inguinal hernia repair      Implantable loop recorder present      S/P colonoscopy      History of tonsillectomy      Stented coronary artery

## 2023-04-17 ENCOUNTER — NON-APPOINTMENT (OUTPATIENT)
Age: 68
End: 2023-04-17

## 2023-04-17 ENCOUNTER — TRANSCRIPTION ENCOUNTER (OUTPATIENT)
Age: 68
End: 2023-04-17

## 2023-04-17 PROBLEM — M19.90 UNSPECIFIED OSTEOARTHRITIS, UNSPECIFIED SITE: Chronic | Status: ACTIVE | Noted: 2023-04-10

## 2023-04-17 RX ORDER — AMLODIPINE BESYLATE 5 MG/1
5 TABLET ORAL
Refills: 0 | Status: COMPLETED | COMMUNITY
End: 2023-04-17

## 2023-04-17 RX ORDER — NEBIVOLOL HYDROCHLORIDE 10 MG/1
10 TABLET ORAL DAILY
Refills: 0 | Status: COMPLETED | COMMUNITY
Start: 2021-04-28 | End: 2023-04-17

## 2023-04-17 RX ORDER — ALPRAZOLAM 0.5 MG/1
0.5 TABLET ORAL
Qty: 90 | Refills: 0 | Status: COMPLETED | COMMUNITY
Start: 2021-04-23 | End: 2023-04-17

## 2023-04-17 RX ORDER — FENUGREEK SEED/BL.THISTLE/ANIS 340 MG
CAPSULE ORAL
Refills: 0 | Status: COMPLETED | COMMUNITY
Start: 2021-04-28 | End: 2023-04-17

## 2023-04-17 RX ORDER — SUCRALFATE 1 G/1
1 TABLET ORAL
Refills: 0 | Status: COMPLETED | COMMUNITY
Start: 2021-04-28 | End: 2023-04-17

## 2023-04-17 RX ORDER — AZILSARTAN KAMEDOXOMIL 40 MG/1
40 TABLET ORAL
Refills: 0 | Status: COMPLETED | COMMUNITY
End: 2023-04-17

## 2023-04-18 ENCOUNTER — TRANSCRIPTION ENCOUNTER (OUTPATIENT)
Age: 68
End: 2023-04-18

## 2023-04-18 ENCOUNTER — APPOINTMENT (OUTPATIENT)
Dept: CARDIOTHORACIC SURGERY | Facility: CLINIC | Age: 68
End: 2023-04-18
Payer: MEDICARE

## 2023-04-18 ENCOUNTER — OUTPATIENT (OUTPATIENT)
Dept: OUTPATIENT SERVICES | Facility: HOSPITAL | Age: 68
LOS: 1 days | End: 2023-04-18
Payer: MEDICARE

## 2023-04-18 VITALS — OXYGEN SATURATION: 91 % | DIASTOLIC BLOOD PRESSURE: 48 MMHG | SYSTOLIC BLOOD PRESSURE: 127 MMHG

## 2023-04-18 VITALS — RESPIRATION RATE: 16 BRPM | HEART RATE: 100 BPM | TEMPERATURE: 98 F | OXYGEN SATURATION: 98 % | HEIGHT: 71 IN

## 2023-04-18 DIAGNOSIS — Z95.828 PRESENCE OF OTHER VASCULAR IMPLANTS AND GRAFTS: ICD-10-CM

## 2023-04-18 DIAGNOSIS — Z90.49 ACQUIRED ABSENCE OF OTHER SPECIFIED PARTS OF DIGESTIVE TRACT: Chronic | ICD-10-CM

## 2023-04-18 DIAGNOSIS — Z98.890 OTHER SPECIFIED POSTPROCEDURAL STATES: Chronic | ICD-10-CM

## 2023-04-18 DIAGNOSIS — Z95.818 PRESENCE OF OTHER CARDIAC IMPLANTS AND GRAFTS: Chronic | ICD-10-CM

## 2023-04-18 DIAGNOSIS — Z95.5 PRESENCE OF CORONARY ANGIOPLASTY IMPLANT AND GRAFT: Chronic | ICD-10-CM

## 2023-04-18 DIAGNOSIS — Z90.89 ACQUIRED ABSENCE OF OTHER ORGANS: Chronic | ICD-10-CM

## 2023-04-18 DIAGNOSIS — R06.02 SHORTNESS OF BREATH: ICD-10-CM

## 2023-04-18 PROBLEM — N20.0 CALCULUS OF KIDNEY: Chronic | Status: ACTIVE | Noted: 2023-04-10

## 2023-04-18 PROBLEM — I71.20 THORACIC AORTIC ANEURYSM, WITHOUT RUPTURE, UNSPECIFIED: Chronic | Status: ACTIVE | Noted: 2023-04-10

## 2023-04-18 PROBLEM — E78.5 HYPERLIPIDEMIA, UNSPECIFIED: Chronic | Status: ACTIVE | Noted: 2023-04-10

## 2023-04-18 PROBLEM — E04.1 NONTOXIC SINGLE THYROID NODULE: Chronic | Status: ACTIVE | Noted: 2023-04-10

## 2023-04-18 PROBLEM — A41.9 SEPSIS, UNSPECIFIED ORGANISM: Chronic | Status: ACTIVE | Noted: 2023-04-10

## 2023-04-18 PROCEDURE — 74174 CTA ABD&PLVS W/CONTRAST: CPT

## 2023-04-18 PROCEDURE — 99024 POSTOP FOLLOW-UP VISIT: CPT

## 2023-04-18 PROCEDURE — 74174 CTA ABD&PLVS W/CONTRAST: CPT | Mod: 26,MH

## 2023-04-18 PROCEDURE — 71275 CT ANGIOGRAPHY CHEST: CPT | Mod: 26,MH

## 2023-04-18 PROCEDURE — 71275 CT ANGIOGRAPHY CHEST: CPT

## 2023-04-18 RX ORDER — BUDESONIDE AND FORMOTEROL FUMARATE DIHYDRATE 160; 4.5 UG/1; UG/1
160-4.5 AEROSOL RESPIRATORY (INHALATION) TWICE DAILY
Qty: 1 | Refills: 5 | Status: ACTIVE | COMMUNITY
Start: 2023-04-18 | End: 1900-01-01

## 2023-04-18 RX ORDER — ASPIRIN ENTERIC COATED TABLETS 81 MG 81 MG/1
81 TABLET, DELAYED RELEASE ORAL DAILY
Refills: 0 | Status: ACTIVE | COMMUNITY
Start: 2021-04-28

## 2023-04-19 NOTE — PROCEDURE
[FreeTextEntry1] : TTE on 04/17/2023  \par Normal size left ventricle. Left ventricular ejection fraction is\par normal. The ejection fraction is 66 %.  There is mild concentric left\par ventricular hypertrophy.\par RIGHT VENTRICLE: Normal size right ventricle.\par MITRAL VALVE: The mitral valve appears grossly normal.\par AORTIC VALVE: The aortic valve is tricuspid, thickened and has normal leaflet excursion .\par AORTA: The aortic root exhibits dilatation. Ascending aorta is normal in size.\par There is no pericardial effusion. .

## 2023-04-19 NOTE — CONSULT LETTER
[FreeTextEntry2] : Dr.Michael Benitez [FreeTextEntry3] : Kevin Parra MD\par  & \par \par Cardiovascular & Thoracic Surgery\par [facility] \par [address]\par [facility] [address]\par \par

## 2023-04-19 NOTE — ASSESSMENT
[FreeTextEntry1] : Mr. GARDNER is a 67 year old male with  past medical history of Current smoker (20 pack years), \par COPD, CAD, Dilated aortic root, HTN, HLD, GERD, Thyroid nodule, Lung Nodule, Liver Nodule, Fatty Liver, kidney stones, colon polyps s/p colon resection. PShx Colon resection (Nov. 2021), Appendectomy, umbilical hernia repair, b/l inguinal hernia repair, tonsillectomy, Loop recorder, Coronary stents. C/o ongoing epigastric intermittent pain unrelated to activity. Current smoker but has been weaning his cigarette use significantly. \par \par He is S/P Resection of ascending aortic aneurysm with placement of a 32 Hema sheild Platinum graft on 4/11/23. Post op course uneventful. He is here for post op visit. He presented to [place] ER for acute shortness of breath while in the shower. His workup was unremarkable and he was discharged home. He is being seen today for follow up. Patient reports acute SOB while taking shower he notes dyspnea on ambulation. While sitting his SPO2 96-97% after ambulation his Spo2 drops to 92%. He denies chest pain only chest/incisional discomfort. His appetite is fair, he is passing gas and urinating without difficulty. \par \par TTE on 04/17/2023  \par Normal size left ventricle. Left ventricular ejection fraction is\par normal. The ejection fraction is 66 %.  There is mild concentric left\par ventricular hypertrophy.\par RIGHT VENTRICLE: Normal size right ventricle.\par MITRAL VALVE: The mitral valve appears grossly normal.\par AORTIC VALVE: The aortic valve is tricuspid, thickened and has normal leaflet excursion .\par AORTA: The aortic root exhibits dilatation. Ascending aorta is normal in size.\par There is no pericardial effusion. .\par \par CT ANGIO ABD PELV (W)AW IC   \par PROCEDURE DATE:  04/18/2023\par INDICATION: Shortness of breath status post ascending aortic replacement\par COMPARISON: CT chest 1/26/2023, CT abdomen and pelvis 11/10/2021\par CT Angiography of the Chest Abdomen and Pelvis.\par No main, right, left or lobar pulmonary embolism. Please note, this study was not optimally timed to completely evaluate for pulmonary embolism.\par Interval ascending aorta replacement with a small volume of perigraft fluid and air.\par New bilateral upper lobe predominant ground glass opacities may be infectious in etiology.\par \par Plan:\par 1) Resume Ventolin PRN and Symbicort\par 2) Continue diuretics- Increase Torsemide and Aldactone for diuresis \par 3) Return next Tuesday for clinical recheck \par \par

## 2023-04-19 NOTE — REASON FOR VISIT
[de-identified] :  Resection of ascending aortic aneurysm with placement of a 32 Hemashiled Platinum graft [de-identified] : 4/11/23 [Friend] : friend

## 2023-04-20 ENCOUNTER — APPOINTMENT (OUTPATIENT)
Dept: CARE COORDINATION | Facility: HOME HEALTH | Age: 68
End: 2023-04-20
Payer: MEDICARE

## 2023-04-20 VITALS
SYSTOLIC BLOOD PRESSURE: 128 MMHG | RESPIRATION RATE: 16 BRPM | OXYGEN SATURATION: 99 % | DIASTOLIC BLOOD PRESSURE: 78 MMHG | HEART RATE: 100 BPM

## 2023-04-20 PROCEDURE — 82947 ASSAY GLUCOSE BLOOD QUANT: CPT

## 2023-04-20 PROCEDURE — C1751: CPT

## 2023-04-20 PROCEDURE — 82435 ASSAY OF BLOOD CHLORIDE: CPT

## 2023-04-20 PROCEDURE — 86850 RBC ANTIBODY SCREEN: CPT

## 2023-04-20 PROCEDURE — P9045: CPT

## 2023-04-20 PROCEDURE — 71045 X-RAY EXAM CHEST 1 VIEW: CPT

## 2023-04-20 PROCEDURE — 99024 POSTOP FOLLOW-UP VISIT: CPT

## 2023-04-20 PROCEDURE — 80048 BASIC METABOLIC PNL TOTAL CA: CPT

## 2023-04-20 PROCEDURE — 85384 FIBRINOGEN ACTIVITY: CPT

## 2023-04-20 PROCEDURE — C1768: CPT

## 2023-04-20 PROCEDURE — G0463: CPT

## 2023-04-20 PROCEDURE — 85730 THROMBOPLASTIN TIME PARTIAL: CPT

## 2023-04-20 PROCEDURE — 84100 ASSAY OF PHOSPHORUS: CPT

## 2023-04-20 PROCEDURE — 80053 COMPREHEN METABOLIC PANEL: CPT

## 2023-04-20 PROCEDURE — 85018 HEMOGLOBIN: CPT

## 2023-04-20 PROCEDURE — 85027 COMPLETE CBC AUTOMATED: CPT

## 2023-04-20 PROCEDURE — 86900 BLOOD TYPING SEROLOGIC ABO: CPT

## 2023-04-20 PROCEDURE — 71046 X-RAY EXAM CHEST 2 VIEWS: CPT

## 2023-04-20 PROCEDURE — 87641 MR-STAPH DNA AMP PROBE: CPT

## 2023-04-20 PROCEDURE — 85520 HEPARIN ASSAY: CPT

## 2023-04-20 PROCEDURE — 83605 ASSAY OF LACTIC ACID: CPT

## 2023-04-20 PROCEDURE — 82553 CREATINE MB FRACTION: CPT

## 2023-04-20 PROCEDURE — 94002 VENT MGMT INPAT INIT DAY: CPT

## 2023-04-20 PROCEDURE — C1889: CPT

## 2023-04-20 PROCEDURE — P9073: CPT

## 2023-04-20 PROCEDURE — 84132 ASSAY OF SERUM POTASSIUM: CPT

## 2023-04-20 PROCEDURE — 93005 ELECTROCARDIOGRAM TRACING: CPT

## 2023-04-20 PROCEDURE — 36415 COLL VENOUS BLD VENIPUNCTURE: CPT

## 2023-04-20 PROCEDURE — 97165 OT EVAL LOW COMPLEX 30 MIN: CPT

## 2023-04-20 PROCEDURE — C1769: CPT

## 2023-04-20 PROCEDURE — 83735 ASSAY OF MAGNESIUM: CPT

## 2023-04-20 PROCEDURE — 85025 COMPLETE CBC W/AUTO DIFF WBC: CPT

## 2023-04-20 PROCEDURE — 82565 ASSAY OF CREATININE: CPT

## 2023-04-20 PROCEDURE — 82962 GLUCOSE BLOOD TEST: CPT

## 2023-04-20 PROCEDURE — 93880 EXTRACRANIAL BILAT STUDY: CPT

## 2023-04-20 PROCEDURE — 97116 GAIT TRAINING THERAPY: CPT

## 2023-04-20 PROCEDURE — 86923 COMPATIBILITY TEST ELECTRIC: CPT

## 2023-04-20 PROCEDURE — 88305 TISSUE EXAM BY PATHOLOGIST: CPT

## 2023-04-20 PROCEDURE — U0005: CPT

## 2023-04-20 PROCEDURE — 86901 BLOOD TYPING SEROLOGIC RH(D): CPT

## 2023-04-20 PROCEDURE — 97161 PT EVAL LOW COMPLEX 20 MIN: CPT

## 2023-04-20 PROCEDURE — 86891 AUTOLOGOUS BLOOD OP SALVAGE: CPT

## 2023-04-20 PROCEDURE — 84295 ASSAY OF SERUM SODIUM: CPT

## 2023-04-20 PROCEDURE — 85610 PROTHROMBIN TIME: CPT

## 2023-04-20 PROCEDURE — 82803 BLOOD GASES ANY COMBINATION: CPT

## 2023-04-20 PROCEDURE — C9803: CPT

## 2023-04-20 PROCEDURE — 84484 ASSAY OF TROPONIN QUANT: CPT

## 2023-04-20 PROCEDURE — 87640 STAPH A DNA AMP PROBE: CPT

## 2023-04-20 PROCEDURE — 82330 ASSAY OF CALCIUM: CPT

## 2023-04-20 PROCEDURE — 85014 HEMATOCRIT: CPT

## 2023-04-20 PROCEDURE — 82550 ASSAY OF CK (CPK): CPT

## 2023-04-20 PROCEDURE — 83036 HEMOGLOBIN GLYCOSYLATED A1C: CPT

## 2023-04-20 PROCEDURE — U0003: CPT

## 2023-04-20 RX ORDER — OMEPRAZOLE 40 MG/1
40 CAPSULE, DELAYED RELEASE ORAL
Qty: 30 | Refills: 2 | Status: DISCONTINUED | COMMUNITY
Start: 2021-03-12 | End: 2023-04-20

## 2023-04-20 NOTE — ASSESSMENT
[FreeTextEntry1] : 4/11/23   Replacement of ascending aneurysm with 32mm interposition graft, Dr Parra\par tobacco cessation

## 2023-04-20 NOTE — HISTORY OF PRESENT ILLNESS
[FreeTextEntry1] : 67 year old male s/p  Ascending Aortic Replacement with Dr. Parra on 4/11/2023. \par PMhx Current smoker (20 pack years)- last smoked just prior to surgery \par COPD, CAD, Dilated aortic root, HTN, HLD, GERD, Thyroid nodule, Lung Nodule,\par Liver Nodule, Fatty Liver, kidney stones, colon polyps s/p colon resection.\par PShx Colon resection (Nov. 2021), Appendectomy, umbilical hernia repair, b/l\par inguinal hernia repair, tonsillectomy, Loop recorder, Coronary stents. C/o\par ongoing epigastric intermittent pain unrelated to activity. Current smoker but\par has been weaning his cigarette use significantly. At present denies any chest\par pain, palpitations, SOB, N/V, fever or chills.\par 4/11/23   Replacement of ascending aneurysm with 32mm interposition graft\par 1000 FEIBA, 2 platelet, 1 PRBC\par \par pt presented to ED at [place] one day after discharge with c/o sob which he attributed to narcotic use\par seen in CTS office next day, diuretics increased and ventolin ordered \par \par pt seen today, recovering well in apartment of relatives house\par education and emotional support provided\par all questions answered

## 2023-04-25 ENCOUNTER — APPOINTMENT (OUTPATIENT)
Dept: CARDIOTHORACIC SURGERY | Facility: CLINIC | Age: 68
End: 2023-04-25
Payer: MEDICARE

## 2023-04-25 ENCOUNTER — NON-APPOINTMENT (OUTPATIENT)
Age: 68
End: 2023-04-25

## 2023-04-25 ENCOUNTER — LABORATORY RESULT (OUTPATIENT)
Age: 68
End: 2023-04-25

## 2023-04-25 VITALS
TEMPERATURE: 98 F | DIASTOLIC BLOOD PRESSURE: 78 MMHG | WEIGHT: 185 LBS | BODY MASS INDEX: 25.9 KG/M2 | OXYGEN SATURATION: 99 % | RESPIRATION RATE: 16 BRPM | HEIGHT: 71 IN | SYSTOLIC BLOOD PRESSURE: 108 MMHG | HEART RATE: 104 BPM

## 2023-04-25 PROCEDURE — 99024 POSTOP FOLLOW-UP VISIT: CPT

## 2023-04-25 RX ORDER — METOPROLOL SUCCINATE 100 MG/1
100 TABLET, EXTENDED RELEASE ORAL DAILY
Qty: 90 | Refills: 2 | Status: ACTIVE | COMMUNITY
Start: 1900-01-01 | End: 1900-01-01

## 2023-04-25 RX ORDER — ATORVASTATIN CALCIUM 80 MG/1
80 TABLET, FILM COATED ORAL
Refills: 0 | Status: COMPLETED | COMMUNITY
Start: 2021-04-28 | End: 2023-04-25

## 2023-04-25 RX ORDER — ATORVASTATIN CALCIUM 40 MG/1
40 TABLET, FILM COATED ORAL
Qty: 30 | Refills: 0 | Status: ACTIVE | COMMUNITY
Start: 2023-04-25

## 2023-04-25 NOTE — REASON FOR VISIT
[Family Member] : family member [de-identified] :  Resection of ascending aortic aneurysm with placement of a 32 Hemashiled Platinum graft [de-identified] : 4/11/23

## 2023-04-25 NOTE — END OF VISIT
[FreeTextEntry3] : \par I personally scribed for AMBER GOEL on Apr 25 2023  9:30AM . \par \par \par \par \par Physician Attestation:\par Documented by LAYLA CHAMPAGNE acting as a scribe for AMBER GOEL 04/25/2023 . \par                 All medical record entries made by the Scribe were at my, AMBER GOEL , direction and personally dictated by me on 04/25/2023 . I have reviewed the chart and agree that the record accurately reflects my personal performance of the history, physical exam, assessment and plan\par \par

## 2023-04-25 NOTE — ASSESSMENT
[FreeTextEntry1] : Mr. GARDNER is a 67 year old male with  past medical history of Current smoker (20 pack years), \par COPD, CAD, Dilated aortic root, HTN, HLD, GERD, Thyroid nodule, Lung Nodule, Liver Nodule, Fatty Liver, kidney stones, colon polyps s/p colon resection. PShx Colon resection (Nov. 2021), Appendectomy, umbilical hernia repair, b/l inguinal hernia repair, tonsillectomy, Loop recorder, Coronary stents. C/o ongoing epigastric intermittent pain unrelated to activity. Current smoker but has been weaning his cigarette use significantly. \par \par He is S/P Resection of ascending aortic aneurysm with placement of a 32 Hema sheild Platinum graft on 4/11/23. Post op course uneventful. He is here for post op visit. He presented to [place] ER for acute shortness of breath while in the shower. His workup was unremarkable and he was discharged home. He is being seen today for follow up. Patient reports acute SOB while taking shower he notes dyspnea on ambulation. While sitting his SPO2 96-97% after ambulation his Spo2 drops to 92%. He denies chest pain only chest/incisional discomfort. His appetite is fair, he is passing gas and urinating without difficulty. \par \par TTE on 04/17/2023  \par Normal size left ventricle. Left ventricular ejection fraction is\par normal. The ejection fraction is 66 %.  There is mild concentric left\par ventricular hypertrophy.\par RIGHT VENTRICLE: Normal size right ventricle.\par MITRAL VALVE: The mitral valve appears grossly normal.\par AORTIC VALVE: The aortic valve is tricuspid, thickened and has normal leaflet excursion .\par AORTA: The aortic root exhibits dilatation. Ascending aorta is normal in size.\par There is no pericardial effusion. .\par \par CT ANGIO ABD PELV (W)AW IC   \par PROCEDURE DATE:  04/18/2023\par INDICATION: Shortness of breath status post ascending aortic replacement\par COMPARISON: CT chest 1/26/2023, CT abdomen and pelvis 11/10/2021\par CT Angiography of the Chest Abdomen and Pelvis.\par No main, right, left or lobar pulmonary embolism. Please note, this study was not optimally timed to completely evaluate for pulmonary embolism.\par Interval ascending aorta replacement with a small volume of perigraft fluid and air.\par New bilateral upper lobe predominant ground glass opacities may be infectious in etiology.\par \par Today he presents and reports that he is doing well. His SOB is getting better. He has occasional palpitations and dizziness. Denies any syncope or pedal edema.\par \par \par \par Today on exam patient's lungs clear bilaterally, normal sinus rhythm, sternum stable, incision clean, dry and intact. No peripheral edema noted. \par \par  Instructed patient on importance of optimal glycemic control, daily showering, daily weights, any signs of fever (temperature greater than 101F, chills,  incentive spirometer use, and increase ambulation as tolerated. Instructed to call office with any signs or symptoms of infection or weight gain of 2 or more pounds in 1 day or 3 or more pounds in 1 week.  \par \par Discussed intake of plant based foods, including vegetables, fruits, and whole grain foods: legumes, nuts and seeds, fish or seafood, lean meats, and non-fat or low-fat diary foods. Plant based oils (non-tropical) in place of solid fats. Instructed patient to limit intake of high fat meats and processed meats, high-fat diary foods, dietary cholesterol and sodium, foods and beverages with added sugars. \par \par Plan:\par 1) Continue current medication regimen\par 2) Follow up with cardiologist (Dr.Rohan Paulino)  and PCP \par 3) May return on as needed basis \par 4) Weight daily and if weight is going up resume diuretics \par 5) SBE antibiotic prophylaxis discussed at length \par 6) Continue to increase activity and walk daily as tolerated. Continue to use incentive spirometer. \par 7) Keep legs elevated above heart when resting/sitting/sleeping. \par 8) Call MD if you experience fever, fatigue, dizziness, confusion, syncope, shortness of breath, chest pain not relieved with analgesics, increased redness/drainage from the surgical  incision site\par 9) Virtual Cardiac Rehab referral\par 10) Resume Lipitor 40 mg daily \par 11) Decrease Torsemide to 10 mg daily and Aldactone 25 mg daily for 1 week and stop there after \par \par \par \par

## 2023-04-25 NOTE — CONSULT LETTER
[FreeTextEntry2] : Dr.Michael Benitez [FreeTextEntry3] : Kevin Parra MD\par  & \par \par Cardiovascular & Thoracic Surgery\par [facility] \par [address]\par [facility] [address]\par \par

## 2023-05-01 ENCOUNTER — APPOINTMENT (OUTPATIENT)
Dept: CARDIOTHORACIC SURGERY | Facility: CLINIC | Age: 68
End: 2023-05-01
Payer: MEDICARE

## 2023-05-01 VITALS
OXYGEN SATURATION: 99 % | HEIGHT: 71 IN | HEART RATE: 83 BPM | WEIGHT: 185 LBS | TEMPERATURE: 98 F | DIASTOLIC BLOOD PRESSURE: 77 MMHG | SYSTOLIC BLOOD PRESSURE: 116 MMHG | RESPIRATION RATE: 16 BRPM | BODY MASS INDEX: 25.9 KG/M2

## 2023-05-01 DIAGNOSIS — I71.9 AORTIC ANEURYSM OF UNSPECIFIED SITE, W/OUT RUPTURE: ICD-10-CM

## 2023-05-01 PROCEDURE — 99024 POSTOP FOLLOW-UP VISIT: CPT

## 2023-05-01 RX ORDER — TORSEMIDE 20 MG/1
20 TABLET ORAL
Qty: 30 | Refills: 0 | Status: COMPLETED | COMMUNITY
Start: 1900-01-01 | End: 2023-05-01

## 2023-05-01 RX ORDER — SPIRONOLACTONE 50 MG/1
50 TABLET ORAL DAILY
Qty: 20 | Refills: 0 | Status: COMPLETED | COMMUNITY
Start: 1900-01-01 | End: 2023-05-01

## 2023-05-01 NOTE — REASON FOR VISIT
[de-identified] : ascending aortic aneurysm with placement of a 32 Hemashiled Platinum graft  [de-identified] : 4/11/2023 [de-identified] : Presents today and reports feeling well, progressing daily.  He reports he felt a sharp radiating pain to his right chest yesterday that is "burning" in nature.  He reports it is constant but worsened with deep breathing, palpation or certain movements.  Not associated with exertion and is just kind of there since yesterday.  He denies any SOB, dizziness

## 2023-05-01 NOTE — ASSESSMENT
[FreeTextEntry1] : Today on exam patient's lungs clear bilaterally, normal sinus rhythm, sternum stable, incision clean, dry and intact. No peripheral edema noted. Instructed patient on importance of optimal glycemic control, daily showering, daily weights, incentive spirometer use, and increase ambulation as tolerated. Instructed to call office with any signs or symptoms of infection or weight gain of 2 or more pounds in 1 day or 3 or more pounds in 1 week. \par \par Plan:\par \par - Will start Gabapentin 200mg HS \par - Continue rest of current medication regimen\par - Follow up with cardiologist\par - Follow up with PCP \par - Continue to walk and increase as tolerated\par - Low salt diet and fluids discussed in detail\par - Tight glucose control discussed in detail for wound healing\par - SBE antibiotic prophylaxis discussed at length \par - Call with any questions or concerns\par \par

## 2023-05-02 ENCOUNTER — TRANSCRIPTION ENCOUNTER (OUTPATIENT)
Age: 68
End: 2023-05-02

## 2023-05-08 ENCOUNTER — TRANSCRIPTION ENCOUNTER (OUTPATIENT)
Age: 68
End: 2023-05-08

## 2023-05-09 ENCOUNTER — LABORATORY RESULT (OUTPATIENT)
Age: 68
End: 2023-05-09

## 2023-05-09 ENCOUNTER — TRANSCRIPTION ENCOUNTER (OUTPATIENT)
Age: 68
End: 2023-05-09

## 2023-05-09 ENCOUNTER — APPOINTMENT (OUTPATIENT)
Dept: CARDIOTHORACIC SURGERY | Facility: CLINIC | Age: 68
End: 2023-05-09
Payer: MEDICARE

## 2023-05-09 DIAGNOSIS — G89.18 OTHER ACUTE POSTPROCEDURAL PAIN: ICD-10-CM

## 2023-05-09 PROCEDURE — 99024 POSTOP FOLLOW-UP VISIT: CPT

## 2023-05-09 RX ORDER — SUCRALFATE 1 G/1
1 TABLET ORAL
Refills: 0 | Status: ACTIVE | COMMUNITY

## 2023-05-09 RX ORDER — ESOMEPRAZOLE MAGNESIUM 40 MG/1
40 CAPSULE, DELAYED RELEASE ORAL DAILY
Refills: 2 | Status: ACTIVE | COMMUNITY
Start: 2023-05-09

## 2023-05-09 NOTE — COUNSELING
[SBE antibiotic prophylaxis] : SBE antibiotic prophylaxis was recommended [Stress Management] : stress management

## 2023-05-09 NOTE — PHYSICAL EXAM
[] : no respiratory distress [Heart Rate And Rhythm] : heart rate was normal and rhythm regular [Clean] : clean [Dry] : dry [Healing Well] : healing well [No Edema] : no edema

## 2023-05-09 NOTE — REASON FOR VISIT
[de-identified] : ascending aortic aneurysm with placement of a 32 Hemashiled Platinum graft  [de-identified] : 4/11/2023 [de-identified] : Presents today and reports feeling well, progressing daily.  He reports he felt a sharp radiating pain to his right chest yesterday that is "burning" in nature.  He reports it is constant but worsened with deep breathing, palpation or certain movements.  Not associated with exertion and is just kind of there since yesterday.  He denies any SOB, dizziness

## 2023-05-11 ENCOUNTER — TRANSCRIPTION ENCOUNTER (OUTPATIENT)
Age: 68
End: 2023-05-11

## 2023-05-12 ENCOUNTER — TRANSCRIPTION ENCOUNTER (OUTPATIENT)
Age: 68
End: 2023-05-12

## 2023-05-14 ENCOUNTER — TRANSCRIPTION ENCOUNTER (OUTPATIENT)
Age: 68
End: 2023-05-14

## 2023-05-16 ENCOUNTER — TRANSCRIPTION ENCOUNTER (OUTPATIENT)
Age: 68
End: 2023-05-16

## 2023-05-31 ENCOUNTER — OUTPATIENT (OUTPATIENT)
Dept: OUTPATIENT SERVICES | Facility: HOSPITAL | Age: 68
LOS: 1 days | End: 2023-05-31
Payer: MEDICARE

## 2023-05-31 ENCOUNTER — APPOINTMENT (OUTPATIENT)
Dept: CARDIOTHORACIC SURGERY | Facility: CLINIC | Age: 68
End: 2023-05-31
Payer: MEDICARE

## 2023-05-31 ENCOUNTER — NON-APPOINTMENT (OUTPATIENT)
Age: 68
End: 2023-05-31

## 2023-05-31 VITALS
TEMPERATURE: 98 F | SYSTOLIC BLOOD PRESSURE: 145 MMHG | DIASTOLIC BLOOD PRESSURE: 83 MMHG | RESPIRATION RATE: 16 BRPM | BODY MASS INDEX: 26.6 KG/M2 | HEIGHT: 71 IN | WEIGHT: 190 LBS | OXYGEN SATURATION: 97 % | HEART RATE: 86 BPM

## 2023-05-31 DIAGNOSIS — Z98.890 OTHER SPECIFIED POSTPROCEDURAL STATES: Chronic | ICD-10-CM

## 2023-05-31 DIAGNOSIS — Z95.5 PRESENCE OF CORONARY ANGIOPLASTY IMPLANT AND GRAFT: Chronic | ICD-10-CM

## 2023-05-31 DIAGNOSIS — Z90.89 ACQUIRED ABSENCE OF OTHER ORGANS: Chronic | ICD-10-CM

## 2023-05-31 DIAGNOSIS — Z90.49 ACQUIRED ABSENCE OF OTHER SPECIFIED PARTS OF DIGESTIVE TRACT: Chronic | ICD-10-CM

## 2023-05-31 DIAGNOSIS — Z95.828 PRESENCE OF OTHER VASCULAR IMPLANTS AND GRAFTS: ICD-10-CM

## 2023-05-31 DIAGNOSIS — Z95.818 PRESENCE OF OTHER CARDIAC IMPLANTS AND GRAFTS: Chronic | ICD-10-CM
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
## 2023-05-31 PROCEDURE — 71046 X-RAY EXAM CHEST 2 VIEWS: CPT

## 2023-05-31 PROCEDURE — 99024 POSTOP FOLLOW-UP VISIT: CPT

## 2023-05-31 PROCEDURE — 71046 X-RAY EXAM CHEST 2 VIEWS: CPT | Mod: 26

## 2023-05-31 RX ORDER — KETOROLAC TROMETHAMINE 10 MG/1
10 TABLET, FILM COATED ORAL 3 TIMES DAILY
Qty: 12 | Refills: 0 | Status: COMPLETED | COMMUNITY
Start: 2023-05-09 | End: 2023-05-31

## 2023-05-31 NOTE — REASON FOR VISIT
[de-identified] : Resection of ascending aortic aneurysm with placement of a 32 Hemashield Platinum  graft [de-identified] : 4/11/23

## 2023-05-31 NOTE — ASSESSMENT
[FreeTextEntry1] : Mr. GARDNER is a 67 year old male with  past medical history of Current smoker (20 pack years), \par COPD, CAD, Dilated aortic root, HTN, HLD, GERD, Thyroid nodule, Lung Nodule, Liver Nodule, Fatty Liver, kidney stones, colon polyps s/p colon resection. PShx Colon resection (Nov. 2021), Appendectomy, umbilical hernia repair, b/l inguinal hernia repair, tonsillectomy, Loop recorder, Coronary stents. C/o ongoing epigastric intermittent pain unrelated to activity. Current smoker but has been weaning his cigarette use significantly.\par \par \par He is S/P Resection of ascending aortic aneurysm with placement of a 32 Hemashield Gunpowder  graft on 4/11/23. Post op course uneventful. \par \par He tripped and fell on his chest . Since then he is being having pain to the chest. He is here for clinical check.\par \par He reports that he has pain to [place] which is traveling to both right and Left side  since the fall. Denies any shortness of breath, palpitations, dizziness or pedal edema.\par \par Today on exam patient's lungs clear bilaterally, normal sinus rhythm, sternum stable, incision clean, dry and intact.  No peripheral edema noted. \par \par  Instructed patient on importance of optimal glycemic control, daily showering, daily weights, any signs of fever (temperature greater than 101F, chills,  incentive spirometer use, and increase ambulation as tolerated. Instructed to call office with any signs or symptoms of infection or weight gain of 2 or more pounds in 1 day or 3 or more pounds in 1 week.  \par \par Discussed intake of plant based foods, including vegetables, fruits, and whole grain foods: legumes, nuts and seeds, fish or seafood, lean meats, and non-fat or low-fat diary foods. Plant based oils (non-tropical) in place of solid fats. Instructed patient to limit intake of high fat meats and processed meats, high-fat diary foods, dietary cholesterol and sodium, foods and beverages with added sugars. \par \par Plan:\par 1) Continue current medication regimen\par 2) Follow up with cardiologist and PCP \par 3) May return on as needed basis \par 4) Ambulate as tolerated \par 5) SBE antibiotic prophylaxis discussed at length \par \par \par \par

## 2023-05-31 NOTE — CONSULT LETTER
[FreeTextEntry2] : Dr.Giridhar Gillespie [FreeTextEntry3] : Kevin Parra MD\par  & \par \par Cardiovascular & Thoracic Surgery\par [facility] \par [address]\par [facility] [address]\par \par

## 2023-05-31 NOTE — DISCUSSION/SUMMARY
[Doing Well] : is doing well [Fair Pain Control] : has fair pain control [No Sign of Infection] : is showing no signs of infection [6] : 6 [Coumadin] : the patient is not on Coumadin [de-identified] : MSI pain

## 2023-06-09 RX ORDER — GABAPENTIN 100 MG/1
100 CAPSULE ORAL
Qty: 120 | Refills: 0 | Status: ACTIVE | COMMUNITY
Start: 2023-05-01 | End: 1900-01-01

## 2023-08-10 LAB — ERYTHROCYTE [SEDIMENTATION RATE] IN BLOOD BY WESTERGREN METHOD: 52 MM/HR

## 2023-08-31 NOTE — H&P PST ADULT - PRO TOBACCO QUIT READY
[facility] Counseling:  I discussed with the patient the risks of ustekinumab including but not limited to immunosuppression, malignancy, posterior leukoencephalopathy syndrome, and serious infections.  The patient understands that monitoring is required including a PPD at baseline and must alert us or the primary physician if symptoms of infection or other concerning signs are noted. not motivated to quit

## 2023-09-22 PROBLEM — J44.9 CHRONIC OBSTRUCTIVE PULMONARY DISEASE, UNSPECIFIED COPD TYPE: Status: ACTIVE | Noted: 2017-09-29

## 2023-09-22 PROBLEM — Z95.828 S/P ASCENDING AORTIC REPLACEMENT: Status: ACTIVE | Noted: 2023-04-17

## 2023-09-26 ENCOUNTER — APPOINTMENT (OUTPATIENT)
Dept: GASTROENTEROLOGY | Facility: CLINIC | Age: 68
End: 2023-09-26
Payer: MEDICARE

## 2023-09-26 VITALS
BODY MASS INDEX: 44.1 KG/M2 | HEIGHT: 71 IN | SYSTOLIC BLOOD PRESSURE: 158 MMHG | DIASTOLIC BLOOD PRESSURE: 90 MMHG | HEART RATE: 96 BPM | OXYGEN SATURATION: 98 % | WEIGHT: 315 LBS

## 2023-09-26 DIAGNOSIS — D12.6 BENIGN NEOPLASM OF COLON, UNSPECIFIED: ICD-10-CM

## 2023-09-26 DIAGNOSIS — E66.3 OVERWEIGHT: ICD-10-CM

## 2023-09-26 DIAGNOSIS — Z12.12 ENCOUNTER FOR SCREENING FOR MALIGNANT NEOPLASM OF COLON: ICD-10-CM

## 2023-09-26 DIAGNOSIS — K52.9 NONINFECTIVE GASTROENTERITIS AND COLITIS, UNSPECIFIED: ICD-10-CM

## 2023-09-26 DIAGNOSIS — Z12.11 ENCOUNTER FOR SCREENING FOR MALIGNANT NEOPLASM OF COLON: ICD-10-CM

## 2023-09-26 DIAGNOSIS — Z95.828 PRESENCE OF OTHER VASCULAR IMPLANTS AND GRAFTS: ICD-10-CM

## 2023-09-26 DIAGNOSIS — J44.9 CHRONIC OBSTRUCTIVE PULMONARY DISEASE, UNSPECIFIED: ICD-10-CM

## 2023-09-26 PROCEDURE — 99214 OFFICE O/P EST MOD 30 MIN: CPT

## 2023-09-26 RX ORDER — SENNOSIDES 8.6 MG TABLETS 8.6 MG/1
8.6 TABLET ORAL
Qty: 60 | Refills: 0 | Status: DISCONTINUED | COMMUNITY
End: 2023-09-26

## 2023-09-26 RX ORDER — CHOLESTYRAMINE 4 G/9G
4 POWDER, FOR SUSPENSION ORAL
Qty: 120 | Refills: 2 | Status: ACTIVE | COMMUNITY
Start: 2023-09-26 | End: 1900-01-01

## 2023-09-26 RX ORDER — ACETAMINOPHEN 325 MG/1
325 TABLET ORAL EVERY 6 HOURS
Qty: 240 | Refills: 0 | Status: DISCONTINUED | COMMUNITY
End: 2023-09-26

## 2023-09-26 RX ORDER — SODIUM SULFATE, POTASSIUM SULFATE AND MAGNESIUM SULFATE 1.6; 3.13; 17.5 G/177ML; G/177ML; G/177ML
17.5-3.13-1.6 SOLUTION ORAL
Qty: 1 | Refills: 0 | Status: ACTIVE | COMMUNITY
Start: 2023-09-26 | End: 1900-01-01

## 2023-09-26 RX ORDER — ALBUTEROL SULFATE 90 UG/1
108 (90 BASE) AEROSOL, METERED RESPIRATORY (INHALATION)
Qty: 5 | Refills: 2 | Status: DISCONTINUED | COMMUNITY
Start: 2021-04-28 | End: 2023-09-26

## 2023-11-02 ENCOUNTER — APPOINTMENT (OUTPATIENT)
Dept: SURGERY | Facility: CLINIC | Age: 68
End: 2023-11-02

## 2023-11-10 ENCOUNTER — APPOINTMENT (OUTPATIENT)
Dept: GASTROENTEROLOGY | Facility: HOSPITAL | Age: 68
End: 2023-11-10

## 2024-04-11 NOTE — PATIENT PROFILE ADULT - NSTRANSFERBELONGINGSDISPO_GEN_A_NUR
Faxed referral for Orthopedic to Dr. Axel Franklin  [address] [place] [address]  Phone: [phone] as pt requested.  
Responded to portal msg.    
with patient

## 2024-04-12 ENCOUNTER — APPOINTMENT (OUTPATIENT)
Dept: CT IMAGING | Facility: CLINIC | Age: 69
End: 2024-04-12
Payer: MEDICARE

## 2024-04-12 ENCOUNTER — OUTPATIENT (OUTPATIENT)
Dept: OUTPATIENT SERVICES | Facility: HOSPITAL | Age: 69
LOS: 1 days | End: 2024-04-12
Payer: MEDICARE

## 2024-04-12 DIAGNOSIS — Z90.49 ACQUIRED ABSENCE OF OTHER SPECIFIED PARTS OF DIGESTIVE TRACT: Chronic | ICD-10-CM

## 2024-04-12 DIAGNOSIS — Z95.5 PRESENCE OF CORONARY ANGIOPLASTY IMPLANT AND GRAFT: Chronic | ICD-10-CM

## 2024-04-12 DIAGNOSIS — Z98.890 OTHER SPECIFIED POSTPROCEDURAL STATES: Chronic | ICD-10-CM

## 2024-04-12 DIAGNOSIS — Z95.818 PRESENCE OF OTHER CARDIAC IMPLANTS AND GRAFTS: Chronic | ICD-10-CM

## 2024-04-12 DIAGNOSIS — I71.21 ANEURYSM OF THE ASCENDING AORTA, WITHOUT RUPTURE: ICD-10-CM

## 2024-04-12 DIAGNOSIS — Z90.89 ACQUIRED ABSENCE OF OTHER ORGANS: Chronic | ICD-10-CM

## 2024-04-12 PROCEDURE — 71275 CT ANGIOGRAPHY CHEST: CPT | Mod: 26,MH

## 2024-04-12 PROCEDURE — 71275 CT ANGIOGRAPHY CHEST: CPT

## 2024-04-30 NOTE — OCCUPATIONAL THERAPY INITIAL EVALUATION ADULT - SHORT TERM MEMORY, REHAB EVAL
Pt refuses scheduled albuterol MDI and requests nebulizer.  Pt states nebulizer helps him more than inhaler.  Changed neb to QID and MDI to prn per pt preference.  Pt agrees with plan. Pt requests that this plan not be changed.     Maryse Broussard, RT on 4/30/2024 at 6:59 PM     intact

## 2024-11-07 NOTE — DISCHARGE NOTE NURSING/CASE MANAGEMENT/SOCIAL WORK - NSSCCARECORD_GEN_ALL_CORE
[Patient] : the patient [Risks] : risks [Benefits] : benefits [Alternatives] : alternatives [___ Month(s)] : in [unfilled] month(s) [facility] [FreeTextEntry1] :  Follow-up 6 months blood work reviewed and discussed with patient and wife.  Discussed diet and weight loss.  Questions addressed SBE prophylaxis discussed . Continue amlodipine aspirin famotidine lisinopril HCT metoprolol rosuvastatin     [EKG obtained to assist in diagnosis and management of assessed problem(s)] : EKG obtained to assist in diagnosis and management of assessed problem(s)

## (undated) DEVICE — SUT PROLENE 3-0 36" SH

## (undated) DEVICE — GLV 7 PROTEXIS (WHITE)

## (undated) DEVICE — GOWN TRIMAX XXL

## (undated) DEVICE — DRAPE TOWEL BLUE 17" X 24"

## (undated) DEVICE — DRAPE IOBAN 23" X 23"

## (undated) DEVICE — LAP PAD 18 X 18"

## (undated) DEVICE — SPONGE PEANUT AUTO COUNT

## (undated) DEVICE — ELCTR BOVIE TIP BLADE MEGADYNE E-Z CLEAN 2.75" (X-LONG)

## (undated) DEVICE — STRYKER INTERPULSE HANDPIECE W IRR SUCTION TUBE

## (undated) DEVICE — GLV 8.5 PROTEXIS (WHITE)

## (undated) DEVICE — SUT DOUBLE 6 WIRE STERNAL

## (undated) DEVICE — SUMP PERICARDIAL 20FR 1/4" ADULT

## (undated) DEVICE — CONNECTOR "Y" 1/4 X 1/4 X 1/4"

## (undated) DEVICE — SUT PROLENE 5-0 36" RB-1

## (undated) DEVICE — DRAIN CHANNEL 19FR ROUND FULL FLUTED

## (undated) DEVICE — GLV 8 PROTEXIS (WHITE)

## (undated) DEVICE — BLADE SCALPEL SAFETYLOCK #10

## (undated) DEVICE — SUT PLEDGET PRE PUNCH 4.8 X 9.5 X 1.5 MM

## (undated) DEVICE — ELCTR BOVIE TIP BLADE VALLEYLAB 6.5"

## (undated) DEVICE — DRAPE SLUSH / WARMER 44 X 66"

## (undated) DEVICE — BLADE SCALPEL SAFETYLOCK #15

## (undated) DEVICE — STOPCOCK 4-WAY W SWIVEL MALE LUER LOCK NON VENTED RED CAP

## (undated) DEVICE — SUT BIOSYN 4-0 27" P-12

## (undated) DEVICE — SUCTION YANKAUER NO CONTROL VENT

## (undated) DEVICE — CATH IV SAFE BC 20G X 1.16" (PINK)

## (undated) DEVICE — SUT PROLENE 4-0 36" RB-1

## (undated) DEVICE — SUT BOOT STANDARD (YELLOW) 5 PAIR

## (undated) DEVICE — FEEDING TUBE NG SUMP 16FR 48"

## (undated) DEVICE — ELCTR BVI ACCU-TEMP CAUTERY SHAFT FINE TIP 1/2"

## (undated) DEVICE — FOLEY TRAY 16FR 5CC LF LUBRISIL ADVANCE TEMP CLOSED

## (undated) DEVICE — POSITIONER CARDIAC BUMP

## (undated) DEVICE — SPECIMEN CONTAINER 100ML

## (undated) DEVICE — SUMP INTRACARDIAC 20FR 1/4" ADULT

## (undated) DEVICE — TUBING ATS SUCTION LINE

## (undated) DEVICE — SUT TICRON 2-0 36" CV-316 DA

## (undated) DEVICE — MULTIPLE PERFUSION SET FEMALE 1 INLET LEG W 4 LEGS 15" (BLUE/RED)

## (undated) DEVICE — SUT VICRYL 2-0 27" CT-1 UNDYED

## (undated) DEVICE — Device

## (undated) DEVICE — ELCTR BOVIE TIP BLADE MEGADYNE E-Z CLEAN 6.5" (LONG)

## (undated) DEVICE — VESSEL LOOP EXTRA MAXI-BLUE 0.200" X 22"

## (undated) DEVICE — CHEST DRAIN OASIS DRY SUCTION WATER SEAL

## (undated) DEVICE — SUT POLYSORB 2 30" GS-26

## (undated) DEVICE — DRSG DERMABOND PRINEO 60CM

## (undated) DEVICE — NDL HYPO SAFE 20G X 1.5" (YELLOW)

## (undated) DEVICE — ADAPTER "Y" RECIRCULATING MALE 1 LEG, WHITE CLAMP W COLOR CODED ARROWS 8"

## (undated) DEVICE — CATH IV INTROCAN SAFETY 14G X 1.25" (ORANGE) FEP

## (undated) DEVICE — PACING CABLE A/V TEMP SCREW DOWN 6FT

## (undated) DEVICE — BLADE SCALPEL SAFETYLOCK #11

## (undated) DEVICE — PREP DURAPREP 26CC

## (undated) DEVICE — SUT PROLENE 6-0 30" C-1

## (undated) DEVICE — SOL IRR POUR NS 0.9% 500ML

## (undated) DEVICE — PACK UNIVERSAL CARDIAC

## (undated) DEVICE — SUT POLYSORB 0 36" GS-25 UNDYED

## (undated) DEVICE — CHEST DRAIN PLEUR-EVAC WET/WET ADULT-PEDS SINGLE (QUICK)

## (undated) DEVICE — SUT SOFSILK 2 60" TIES

## (undated) DEVICE — VESSEL LOOP MAXI-RED  0.120" X 16"

## (undated) DEVICE — SOL IRR NS 0.9% 250ML

## (undated) DEVICE — SENSOR MYOCARDIAL TEMP 15MM

## (undated) DEVICE — GUIDE SELECTION F/ATRICLIP LAA SYS

## (undated) DEVICE — SUT QUILL MONODERM 2-0 30CM 24MM

## (undated) DEVICE — NDL TAPR FR EYE 1/2 CIR 3

## (undated) DEVICE — SUT SOFSILK 0 30" V-20

## (undated) DEVICE — SUT VICRYL 0 36" CTX UNDYED

## (undated) DEVICE — GLV 7.5 DERMAPRENE ULTRA

## (undated) DEVICE — SOL NORMOSOL-R PH7.4 1000ML

## (undated) DEVICE — VISITEC 4X4

## (undated) DEVICE — CONNECTOR STRAIGHT 3/8 X 3/8" W LUER LOCK

## (undated) DEVICE — ELCTR CAUTERY 2" LOOP TIP 2200 DEG FAHRENHEIT

## (undated) DEVICE — SUT QUILL MONODERM 2-0 30CM 18MM

## (undated) DEVICE — GLV 6.5 PROTEXIS (WHITE)

## (undated) DEVICE — SOL IRR BAG NS 0.9% 3000ML

## (undated) DEVICE — SUT POLYSORB 2-0 30" GS-21 UNDYED

## (undated) DEVICE — DRAIN RESERVOIR FOR JACKSON PRATT 100CC CARDINAL

## (undated) DEVICE — PHRENIC NERVE PAD MEDIUM

## (undated) DEVICE — VENTING ADAPTER "Y" (RED/BLUE) 7.5"

## (undated) DEVICE — DRSG CURITY GAUZE SPONGE 4 X 4" 12-PLY

## (undated) DEVICE — GLV 7.5 PROTEXIS (WHITE)

## (undated) DEVICE — SUT PROLENE 4-0 36" SH

## (undated) DEVICE — DRSG OPSITE 2.5 X 2"

## (undated) DEVICE — PREP CHLORAPREP HI-LITE ORANGE 26ML

## (undated) DEVICE — PACK UNIVERSAL CARDIAC SUPPLEMNTAL B

## (undated) DEVICE — SAW BLADE MICROAIRE STERNUM 1X34X9.4MM

## (undated) DEVICE — TUBING TUR 2 PRONG

## (undated) DEVICE — POSITIONER FOAM EGG CRATE ULNAR 2PCS (PINK)

## (undated) DEVICE — DRAPE LIGHT HANDLE COVER (BLUE)

## (undated) DEVICE — DRSG OPSITE 13.75 X 4"

## (undated) DEVICE — GOWN TRIMAX LG

## (undated) DEVICE — DRSG TEGADERM 4X4.75"

## (undated) DEVICE — VESSEL LOOP ASPEN SUPERMAXI BLUE

## (undated) DEVICE — SUT PROLENE 5-0 30" RB-2

## (undated) DEVICE — MARKING PEN W RULER

## (undated) DEVICE — PACING CABLE (BLUE) ATRIAL TEMP SCREW DOWN 12FT

## (undated) DEVICE — TOURNIQUET SET 12FR (1 RED, 1 BLUE, 1 SNARE) 7"

## (undated) DEVICE — SUT BLUNT SZ 5

## (undated) DEVICE — SUT SURGICAL STEEL 6 30" BP-1

## (undated) DEVICE — DRAPE MAYO STAND 30"

## (undated) DEVICE — VENTING ADAPTER "Y" (RED/BLUE) 8"

## (undated) DEVICE — PACING CABLE (BROWN) A/V TEMP SCREW DOWN 12FT